# Patient Record
Sex: FEMALE | Race: OTHER | HISPANIC OR LATINO | Employment: FULL TIME | URBAN - METROPOLITAN AREA
[De-identification: names, ages, dates, MRNs, and addresses within clinical notes are randomized per-mention and may not be internally consistent; named-entity substitution may affect disease eponyms.]

---

## 2017-02-16 ENCOUNTER — ALLSCRIPTS OFFICE VISIT (OUTPATIENT)
Dept: OTHER | Facility: OTHER | Age: 52
End: 2017-02-16

## 2017-03-02 ENCOUNTER — ALLSCRIPTS OFFICE VISIT (OUTPATIENT)
Dept: OTHER | Facility: OTHER | Age: 52
End: 2017-03-02

## 2017-03-02 DIAGNOSIS — N93.0 POSTCOITAL AND CONTACT BLEEDING: ICD-10-CM

## 2017-03-02 DIAGNOSIS — Z12.31 ENCOUNTER FOR SCREENING MAMMOGRAM FOR MALIGNANT NEOPLASM OF BREAST: ICD-10-CM

## 2017-03-02 DIAGNOSIS — I83.893 VARICOSE VEINS OF BILATERAL LOWER EXTREMITIES WITH OTHER COMPLICATIONS: ICD-10-CM

## 2017-03-16 ENCOUNTER — ALLSCRIPTS OFFICE VISIT (OUTPATIENT)
Dept: OTHER | Facility: OTHER | Age: 52
End: 2017-03-16

## 2017-03-16 LAB — HCG, QUALITATIVE (HISTORICAL): NEGATIVE

## 2017-05-02 ENCOUNTER — GENERIC CONVERSION - ENCOUNTER (OUTPATIENT)
Dept: OTHER | Facility: OTHER | Age: 52
End: 2017-05-02

## 2017-06-30 ENCOUNTER — HOSPITAL ENCOUNTER (OUTPATIENT)
Dept: NON INVASIVE DIAGNOSTICS | Facility: CLINIC | Age: 52
Discharge: HOME | End: 2017-06-30
Payer: COMMERCIAL

## 2017-06-30 DIAGNOSIS — I83.893 VARICOSE VEINS OF BILATERAL LOWER EXTREMITIES WITH OTHER COMPLICATIONS: ICD-10-CM

## 2017-06-30 PROCEDURE — 93970 EXTREMITY STUDY: CPT

## 2017-07-12 ENCOUNTER — ALLSCRIPTS OFFICE VISIT (OUTPATIENT)
Dept: OTHER | Facility: OTHER | Age: 52
End: 2017-07-12

## 2017-07-12 DIAGNOSIS — I83.893 VARICOSE VEINS OF BILATERAL LOWER EXTREMITIES WITH OTHER COMPLICATIONS: ICD-10-CM

## 2017-07-12 DIAGNOSIS — I87.2 VENOUS INSUFFICIENCY (CHRONIC) (PERIPHERAL): ICD-10-CM

## 2018-01-11 NOTE — MISCELLANEOUS
Message   Recorded as Task   Date: 02/16/2016 10:44 AM, Created By: Ricky Luke   Task Name: Call Back   Assigned To: Kd Ramos   Regarding Patient: Joya Ram, Status: In Progress   HelioLiam Altamirano - 16 Feb 2016 10:44 AM     TASK CREATED  Caller: Self; [phone] (Home); [phone] (Work)  pt called - she called central scheduling and they are telling her that her u/s script or mammo slip she got yesterday needs to be worded differently? please advise [phone]   Deisi Arrieta - 16 Feb 2016 10:45 AM     TASK IN PROGRESS   Deisi Arrieta - 16 Feb 2016 11:15 AM     TASK EDITED  spoke  with      pt has a possible lft breast cyst, but according to , must order a bilateral diag [place] with a lft br u/s    pt aware    eh will contact pt        Active Problems    1  Acute upper respiratory infection (465 9) (J06 9)   2  Breast cyst (610 0) (N60 09)   3  Cellulitis of toe, unspecified laterality (681 10) (L03 039)   4  Contact dermatitis due to plant (692 6) (L25 5)   5  Cough (786 2) (R05)   6  Cyst of left breast (610 0) (N60 02)   7  Dyspepsia (536 8) (K30)   8  Encounter for gynecological examination without abnormal finding (V72 31) (Z01 419)   9  Encounter for routine gynecological examination (V72 31) (Z01 419)   10  Encounter for routine gynecological examination with Papanicolaou smear of cervix    (V72 31,V76 2) (Z01 419,Z12 4)   11  Encounter for screening colonoscopy (V76 51) (Z12 11)   12  Encounter for screening mammogram for malignant neoplasm of breast (V76 12)    (Z12 31)   13  Mammogram abnormal (793 80) (R92 8)   14  Noninfectious Dermatological Conditions (709 9)   15  Screening for HPV (human papillomavirus) (V73 81) (Z11 51)    Current Meds   1  Daily Vitamins Oral Tablet; Therapy: (Recorded:19Jun2014) to Recorded   2  Desmopressin Acetate 0 2 MG Oral Tablet; Therapy: (Recorded:19Jun2014) to Recorded    Allergies    1   No Known Drug Allergies    Plan  Breast cyst · MAMMO DIAGNOSTIC BILATERAL W CAD; Status:Hold For - Scheduling,Retrospective  By Protocol Authorization; Requested for:[id number];    · US BREAST LEFT LIMITED; Status:Hold For - Scheduling,Retrospective By Protocol  Authorization;  Requested for:[id number];     Signatures   Electronically signed by : Lance Barba, ; Feb 16 2016 11:15AM EST                       (Author)

## 2018-01-12 VITALS
WEIGHT: 112 LBS | SYSTOLIC BLOOD PRESSURE: 120 MMHG | HEIGHT: 60 IN | DIASTOLIC BLOOD PRESSURE: 76 MMHG | BODY MASS INDEX: 21.99 KG/M2

## 2018-01-13 VITALS
HEART RATE: 72 BPM | RESPIRATION RATE: 16 BRPM | WEIGHT: 110 LBS | BODY MASS INDEX: 21.6 KG/M2 | SYSTOLIC BLOOD PRESSURE: 102 MMHG | DIASTOLIC BLOOD PRESSURE: 62 MMHG | HEIGHT: 60 IN

## 2018-01-13 NOTE — MISCELLANEOUS
Provider Comments  Provider Comments:   Called patient and lmtcb and r/s        Signatures   Electronically signed by :  Yulia Underwood, ; Feb 16 2017  3:13PM EST                       (Author)

## 2018-01-16 NOTE — PROGRESS NOTES
Assessment    1  Symptomatic varicose veins of both lower extremities (454 8) (S89 020)   2  Spider veins of both lower extremities (454 9) (I30 50)    Plan    1  Gradient compression stocking, below knee, 20-30mm Hg, each; Status:Complete;     Done: 13UXG4415   2  VAS REFLUX LOWER LIMB   ; Status:Active; Requested for:02Mar2017;    3  Apply moisturizing cream or lotion several times a day ; Status:Complete;   Done:   22RGO0476   4  Keep your leg elevated whenever you can to decrease swelling and increase circulation ;   Status:Complete;   Done: 41VAH8989   5  Support stockings can help keep the blood from pooling in the small veins in your feet   and legs ; Status:Complete;   Done: 75VSY3150   6  Follow-up visit in 3 months Evaluation and Treatment  Follow-up  Status: Complete    Done: 87RVX6211    Discussion/Summary  Discussion Summary:   46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  She is having heaviness/aching discomfort on a daily basis and occasional swelling to bilateral lower extremities  -Recommended a trial of conservative measures  This includes the daily use of compression (Rx given) and periodic leg elevation  She should continue to maintain her normal weight    -Will do venous reflux study in 3 months and she will return to the office with a physician to determine treatment options  Counseling Documentation With Imm: The patient was counseled regarding instructions for management, risk factor reductions, prognosis, patient and family education, impressions  Chief Complaint  Chief Complaint Free Text Note Form: " I have varicose veins "   Ms Vanessa Chen is here today to have her legs evaluated  SHe c/o bilateral varicose and spider veins  She states she had injections 2 years ago and was not satisfied with the results  She c/o pain to the lower extremities at the end of the day  SHe c/o edema to the lower extremities at times   SHe does not wear compression stockings  SHe does elevate her legs at end of the day  History of Present Illness  Varicose Veins Ady Parkinson Vascular: The patient is being seen for an initial evaluation of varicose veins  Symptoms:  bilateral bulging veins, bilateral discolored veins, bilateral leg swelling, bilateral muscle cramps, bilateral spider veins, bilateral hyperpigmentation and bilateral leg pain, but no stasis dermatitis, no cellulitis, no venous ulcers, no dry skin, no itching and no bleeding  The patient describes the pain as aching, heavy, dull and symptoms worse toward the end of the day and standing for long periods of time  These symptoms developed year(s) ago  This patient has no history of DVT, pulmonary embolism, superficial venous thrombosis, or a hypercoagulable state  Evaluation and Treatment History: This patient has had previous surgical treatment which has included injection sclerotherapy  This patient is not currently utilizing any conservative management strategies to manage the current symptoms  Free Text HPI: 46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  She is having heaviness/aching discomfort on a daily basis and occasional swelling to bilateral lower extremities for past several years  She also has cosmetic concerns of spider veins  She previously has injection sclerotherapy and has permanent hyperpigmentations at prior injection sites  She stands for long periods of time  She is not tried compression in the past       Review of Systems  Complete Female - Vasc:   Constitutional: no fever, loss in appetite, no recent weight gain, no chills, not feeling tired and no recent weight loss  Eyes: eye pain, dryness of the eyes, does not wears glasses, no sudden vision loss, no blurred vision and no double vision, but no eyesight problems, eyes not red, no purulent discharge from the eyes and no itching of the eyes  ENT: no loss of hearing, no nosebleeds, no hoarseness  Cardiovascular: no chest pain, regular heart rate, painful veins and leg pain with walking  Respiratory: No sob, no wheezing, no cough, no sob with exertion, no orthopnea  Gastrointestinal: No nausea, No vomiting, no diarrhea, no blood in stool  Genitourinary: no dysuria, no Hematuria,no urinary incontinence  Musculoskeletal: no limb pain, no limb swelling  Integumentary: no rash, no lesions, no wounds, no ulcer  Neurological: no dementia, no headache, no numbness, no limb weakness, no dizziness, no difficulty walking  Psychiatric: no depression, no mood disorders, no anxiety  Hematologic/Lymphatic: no bleeding disorder, no easy bruising  ROS Reviewed:   ROS reviewed  Active Problems    1  Acute upper respiratory infection (465 9) (J06 9)   2  Breast cyst (610 0) (N60 09)   3  Cellulitis of toe, unspecified laterality (681 10) (L03 039)   4  Contact dermatitis due to plant (692 6) (L25 5)   5  Cough (786 2) (R05)   6  Cyst of left breast (610 0) (N60 02)   7  Dyspepsia (536 8) (K30)   8  Encounter for gynecological examination without abnormal finding (V72 31) (Z01 419)   9  Encounter for routine gynecological examination (V72 31) (Z01 419)   10  Encounter for routine gynecological examination with Papanicolaou smear of cervix    (V72 31,V76 2) (Z01 419)   11  Encounter for screening colonoscopy (V76 51) (Z12 11)   12  Encounter for screening mammogram for malignant neoplasm of breast (V76 12)    (Z12 31)   13  Mammogram abnormal (793 80) (R92 8)   14  Noninfectious Dermatological Conditions (709 9)   15  Screening for HPV (human papillomavirus) (V73 81) (Z11 51)    Past Medical History    1  History of Age At First Period 13 Years Old (Menarche)  Active Problems And Past Medical History Reviewed: The active problems and past medical history were reviewed and updated today  Surgical History  Surgical History Reviewed: The surgical history was reviewed and updated today  Family History  Mother    1  Family history of Hypertension (V17 49)  Father    2  Family history of Diabetes Mellitus (V18 0)  Family History Reviewed: The family history was reviewed and updated today  Social History    · Being A Social Drinker   · Denied: History of Caffeine Use   · Exercising Regularly   · Never A Smoker   · Unknown If Ever Smoked  Social History Reviewed: The social history was reviewed and updated today  Current Meds   1  Daily Vitamins Oral Tablet; Therapy: (Recorded:19Jun2014) to Recorded   2  Desmopressin Acetate 0 2 MG Oral Tablet; Therapy: (Recorded:19Jun2014) to Recorded  Medication List Reviewed: The medication list was reviewed and updated today  Allergies    1  No Known Drug Allergies    Vitals  Vital Signs    Recorded: 29ONH2934 04:52PM   Heart Rate 62, R Radial   Pulse Quality Normal, R Radial   Respiration Quality Normal   Respiration 16   Systolic 935, RUE, Sitting   Diastolic 70, RUE, Sitting   Height 5 ft    Weight 112 lb 6 oz   BMI Calculated 21 95   BSA Calculated 1 46     Physical Exam    Posterior tibialis: right 2+ and left 2+  Dorsalis pedis: right 2+ and left 2+  Distal Pulse Exam: Normal Capillary Refill  Extremities: bilateral ankle 1+ pitting edema and bilateral pretibial 1+ pitting edema  LE Varicose Veins: right leg truncal veins, left leg truncal veins, right leg spider veins and left leg spider veins  The heart rate was normal  The rhythm was regular  Heart sounds: normal S1 and normal S2    Murmurs: No murmurs were heard  Pulmonary   Respiratory effort: No increased work of breathing or signs of respiratory distress  Auscultation of lungs: Clear to auscultation  No wheezing, no rales, no rhonchi  Abdomen   Abdomen: Abdomen soft, non-tender, no masses, non distended, no rebound tenderness     Psychiatric   Orientation to person, place and time: Normal    Mood and affect: Normal    Ears, Nose, Mouth, and Throat   Hearing: Normal    Neurologic Sensory exam normal   Motor skills intact  Musculoskeletal   Gait and station: Normal  toenail fungus  Skin mild erythematous changes distal 1/3 lower leg darnk brown discolored spider veins of L shin        Future Appointments    Date/Time Provider Specialty Site   03/16/2017 03:00 PM Jake Carlos CNM Obstetrics/Gynecology [facility]   06/14/2017 09:15 AM Doctor, Laila Israel MD Vascular Surgery [facility] [place]     Signatures   Electronically signed by : Danielito Hoyt; Mar  2 2017  5:47PM EST                       (Author)    Electronically signed by : Kevin Jacobo DO; Mar  8 2017  4:46PM EST                       (Author)

## 2018-01-22 VITALS
DIASTOLIC BLOOD PRESSURE: 70 MMHG | HEIGHT: 60 IN | HEART RATE: 62 BPM | WEIGHT: 112.38 LBS | RESPIRATION RATE: 16 BRPM | SYSTOLIC BLOOD PRESSURE: 116 MMHG | BODY MASS INDEX: 22.06 KG/M2

## 2018-01-30 ENCOUNTER — OFFICE VISIT (OUTPATIENT)
Dept: VASCULAR SURGERY | Facility: CLINIC | Age: 53
End: 2018-01-30
Payer: COMMERCIAL

## 2018-01-30 VITALS
WEIGHT: 117 LBS | DIASTOLIC BLOOD PRESSURE: 68 MMHG | TEMPERATURE: 97.6 F | HEIGHT: 60 IN | RESPIRATION RATE: 16 BRPM | HEART RATE: 72 BPM | BODY MASS INDEX: 22.97 KG/M2 | SYSTOLIC BLOOD PRESSURE: 114 MMHG

## 2018-01-30 DIAGNOSIS — I87.2 VENOUS INSUFFICIENCY OF BOTH LOWER EXTREMITIES: ICD-10-CM

## 2018-01-30 DIAGNOSIS — I83.893 SYMPTOMATIC VARICOSE VEINS OF BOTH LOWER EXTREMITIES: Primary | ICD-10-CM

## 2018-01-30 DIAGNOSIS — I83.93 SPIDER VEINS OF BOTH LOWER EXTREMITIES: ICD-10-CM

## 2018-01-30 PROBLEM — N93.0 POSTCOITAL BLEEDING: Status: ACTIVE | Noted: 2017-03-16

## 2018-01-30 PROBLEM — N92.6 IRREGULAR PERIODS/MENSTRUAL CYCLES: Status: ACTIVE | Noted: 2017-03-16

## 2018-01-30 PROCEDURE — 99213 OFFICE O/P EST LOW 20 MIN: CPT | Performed by: SURGERY

## 2018-01-30 RX ORDER — DESMOPRESSIN ACETATE 0.2 MG/1
TABLET ORAL
COMMUNITY
End: 2020-07-29 | Stop reason: SDUPTHER

## 2018-01-30 RX ORDER — ASPIRIN 81 MG
1 TABLET, DELAYED RELEASE (ENTERIC COATED) ORAL DAILY
COMMUNITY

## 2018-01-30 RX ORDER — OXYCODONE HYDROCHLORIDE AND ACETAMINOPHEN 5; 325 MG/1; MG/1
TABLET ORAL
Refills: 0 | COMMUNITY
Start: 2017-11-10 | End: 2018-05-29 | Stop reason: ALTCHOICE

## 2018-01-30 NOTE — PATIENT INSTRUCTIONS
Varicose Veins   WHAT YOU NEED TO KNOW:   What are varicose veins? Varicose veins are veins that become large, twisted, and swollen  They are common on the back of the calves, knees, and thighs  Varicose veins are caused by valves in your veins that do not work properly  This causes blood to collect and increase pressure in the veins of your legs  The increased pressure causes your veins to stretch, get larger, swell, and twist        What increases my risk for varicose veins? · Pregnancy    · A family history of varicose veins    · Being overweight or obese    · Age 48 years or older    · Sitting or standing for long periods of time    · Wearing tight clothing  What are the signs and symptoms of varicose veins? Your symptoms may be worse after you stand or sit for long periods of time  You may have any of the following:  · Blue, purple, or bulging veins in your legs     · Pain, swelling, or muscle cramps in your legs    · Feeling of fatigue or heaviness in your legs  How are varicose veins diagnosed? Your healthcare provider will examine your legs and ask about your medical history  You may need tests, such as a Doppler ultrasound or duplex scan  These tests show your veins and valves, and how your blood is flowing through them  These tests may also show if there is a blockage or blood clot  How are varicose veins treated? The goal of treatment is to decrease symptoms, improve appearance, and prevent further problems  Treatment will depend on which veins are affected and how severe your condition is  You may need procedures to treat or remove your varicose veins  For example, your healthcare provider may inject a solution or use a laser to close the varicose veins  Surgery to remove long veins may also be done  Ask your healthcare provider for more information about procedures used to treat varicose veins  What can I do to manage my symptoms? · Do not sit or stand for long periods of time    This can cause the blood to collect in your legs and make your symptoms worse  Bend or rotate your ankles several times every hour  Walk around for a few minutes every hour to get blood moving in your legs  · Do not cross your legs when you sit  This decreases blood flow to your feet and can make your symptoms worse  · Do not wear tight clothing or shoes  Do not wear high-heeled shoes  Do not wear clothes that are tight around the waist or knees  · Maintain a healthy weight  Being overweight or obese can make your varicose veins worse  Ask your healthcare provider how much you should weigh  Ask him or her to help you create a weight loss plan if you are overweight  · Wear pressure stockings as directed  The stockings are tight and put pressure on your legs  They improve blood flow and help prevent clots  · Elevate your legs  Keep them above the level of your heart for 15 to 30 minutes several times a day  You can also prop the end of your bed up slightly to elevate your legs while you sleep  This will help blood to flow back to your heart  · Get regular exercise  Talk to your healthcare provider about the best exercise plan for you  Exercise can improve blood flow to your legs and feet  When should I seek immediate care? · You have a wound that does not heal or is infected  · You have an injury that has broken your skin and caused your varicose veins to bleed  · Your leg is swollen and hard  · You notice that your legs or feet are turning blue or black  · Your leg feels warm, tender, and painful  It may look swollen and red  When should I contact my healthcare provider? · You have pain in your leg that does not go away or gets worse  · You notice sudden large bruising on your legs  · You have a rash on your leg  · Your symptoms keep you from doing your daily activities  · You have questions or concerns about your condition or care    CARE AGREEMENT:   You have the right to help plan your care  Learn about your health condition and how it may be treated  Discuss treatment options with your caregivers to decide what care you want to receive  You always have the right to refuse treatment  The above information is an  only  It is not intended as medical advice for individual conditions or treatments  Talk to your doctor, nurse or pharmacist before following any medical regimen to see if it is safe and effective for you  © 2017 [employer]  Information is for End User's use only and may not be sold, redistributed or otherwise used for commercial purposes  All illustrations and images included in CareNotes® are the copyrighted property of [employer]  or Jeffery Delgado

## 2018-01-30 NOTE — PROGRESS NOTES
Assessment/Plan:      Patient is a 47 yo F, otherwise healthy, with painful varicosities    Symptomatic varicose veins of both lower extremities  Venous insufficiency of both lower extremities  -     Case request operating room: R GSV EVLT with stab phlebectomies; Standing  -     Basic metabolic panel; Future  -     CBC and Platelet; Future  -     Case request operating room: R GSV EVLT with stab phlebectomies  -patient was previously scheduled for EVLT but insurance denied  -continues to have BLE pain, moderate swelling, heaviness, aching of BLE R=L x 10yrs; this has worsened since last visit 6 mos ago  -patient has completed 3mo trial of compression stocking use, leg elevation, and maintained her active lifestyle and healthy weight; she has noted some improvement but continues to struggle with her more active daily activities (mowing the lawn) and standing for long periods of time; she continues these conservative measures on a daily basis   -reviewed reflux study which shows R CFV reflux and GSV throughout the leg from inguinal to knee and distal calf, SSV at mid calf and L CFV/FV reflux as well as L GSV reflux from inguinal to mid calf  -discussed pathophysiology of venous disease and further treatment options including continued medical management vs intervention with EVLT and stabs including risks and benfitis, including EHIT; patient would like to proceed with intervention; I believe patient is an excellent candidate as she has mainly superficial reflux, it is throughout the thighs B and she is very compliant with conservative management  -plan for R GSV EVLT with stab phlebectomies (will plan for interval L GSV EVLT with stabs, may be within 30d of first procedure)  -labs only; no cardiac or pulm hx (reordered today; consented previously)  -continue to wear your compression stockings daily, elevate your legs, and stay active       Spider veins of both lower extremities  -these are cosmetic only and could be treated with sclero at a later date if desired for an out of pocket cost    Other orders    -     Diet NPO; Sips with meds; Standing  -     Void on call to OR; Standing  -     Insert peripheral IV; Standing  -     Questionnaire Series; Standing  -     Nursing communcation Clip hair (do not shave) prior to surgery; Standing  -     ceFAZolin (ANCEF) IVPB (premix) 1,000 mg; Infuse 50 mL (1,000 mg total) into a venous catheter once   -     Place sequential compression device; Standing  -     Questionnaire Series        Subjective:      Patient ID: Lelon Nyhan is a 45 y o  female  Patient is a 47 yo F, otherwise healthy, with painful varicosities  Patient complains of symptoms in R=L legs  She has pain at the site of large varicosities, moderate edema, heaviness, fatigue, aching  She is active and does a lot of yardwork/housework  This aggravates her symptoms  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  She denies hx of DVT  +fam hx of varicosities in her mother  She has 1 child (age 32) and sxs not related to pregnancy  Sxs started about 10 yrs ago but worse over past year  Since last visit, she has been wearing her compression stockings, elevating her legs when possible  She has stayed active and is a healthy weight  She thinks her symptoms have become more severe than at last visit  She was treated for Lyme disease in the fall but no other changes to medical hx  The following portions of the patient's history were reviewed and updated as appropriate: allergies, current medications, past family history, past medical history, past social history, past surgical history and problem list     Review of Systems   Constitutional: Positive for fatigue  HENT: Negative  Eyes: Negative  Respiratory: Negative  Cardiovascular: Negative  Gastrointestinal: Negative  Endocrine: Negative  Genitourinary: Negative      Musculoskeletal:        BLE edema, achign, throbbing   Skin: Negative  Allergic/Immunologic: Negative  Neurological: Negative  Hematological: Negative  Psychiatric/Behavioral: Negative  Objective:     Physical Exam   Constitutional: She is oriented to person, place, and time  She appears well-developed and well-nourished  HENT:   Head: Normocephalic and atraumatic  Eyes: Conjunctivae are normal    Neck: Normal range of motion  Neck supple  Cardiovascular: Normal rate, regular rhythm and normal heart sounds  No murmur heard  Pulses:       Radial pulses are 2+ on the right side, and 2+ on the left side  Dorsalis pedis pulses are 0 on the right side, and 2+ on the left side  Posterior tibial pulses are 2+ on the right side, and 2+ on the left side  No carotid bruits B   Pulmonary/Chest: Effort normal and breath sounds normal  No respiratory distress  She has no wheezes  Abdominal: Soft  She exhibits no distension  There is no tenderness  There is no rebound  Musculoskeletal: Normal range of motion  She exhibits edema  BLE edema, 2+ pitting, ankles, legs, mild feet  No chronic venous changes  Large varicosity medial left leg and less prominent varicosity right leg diffuse spiders and retics   Neurological: She is alert and oriented to person, place, and time  Skin: Skin is warm and dry  Psychiatric: She has a normal mood and affect  Her behavior is normal    Nursing note and vitals reviewed  Patient Active Problem List   Diagnosis    Cyst of left breast    Dyspepsia    Irregular periods/menstrual cycles    Mammogram abnormal    Postcoital bleeding    Symptomatic varicose veins of both lower extremities    Spider veins of both lower extremities    Venous insufficiency of both lower extremities       History reviewed  No pertinent surgical history    No Known Allergies    Current Outpatient Prescriptions:     desmopressin (DDAVP) 0 2 mg tablet, Take by mouth, Disp: , Rfl:     Multiple Vitamin (DAILY VITAMIN) tablet, Take by mouth, Disp: , Rfl:     oxyCODONE-acetaminophen (PERCOCET) 5-325 mg per tablet, TAKE 1 TO 2 TABLETS BY MOUTH EVERY 4 HOURS AS NEEDED, Disp: , Rfl: 0

## 2018-01-31 ENCOUNTER — DOCUMENTATION (OUTPATIENT)
Dept: VASCULAR SURGERY | Facility: CLINIC | Age: 53
End: 2018-01-31

## 2018-05-29 ENCOUNTER — ANNUAL EXAM (OUTPATIENT)
Dept: OBGYN CLINIC | Facility: MEDICAL CENTER | Age: 53
End: 2018-05-29
Payer: COMMERCIAL

## 2018-05-29 VITALS
DIASTOLIC BLOOD PRESSURE: 60 MMHG | BODY MASS INDEX: 22.19 KG/M2 | HEIGHT: 60 IN | WEIGHT: 113 LBS | SYSTOLIC BLOOD PRESSURE: 102 MMHG

## 2018-05-29 DIAGNOSIS — Z01.419 ENCOUNTER FOR GYNECOLOGICAL EXAMINATION WITHOUT ABNORMAL FINDING: Primary | ICD-10-CM

## 2018-05-29 DIAGNOSIS — R92.2 DENSE BREAST TISSUE: ICD-10-CM

## 2018-05-29 DIAGNOSIS — Z12.4 ENCOUNTER FOR PAPANICOLAOU SMEAR OF CERVIX WITH HUMAN PAPILLOMA VIRUS (HPV) DNA COTESTING: ICD-10-CM

## 2018-05-29 DIAGNOSIS — Z12.31 ENCOUNTER FOR SCREENING MAMMOGRAM FOR MALIGNANT NEOPLASM OF BREAST: ICD-10-CM

## 2018-05-29 PROBLEM — N92.6 IRREGULAR PERIODS/MENSTRUAL CYCLES: Status: RESOLVED | Noted: 2017-03-16 | Resolved: 2018-05-29

## 2018-05-29 PROBLEM — N93.0 POSTCOITAL BLEEDING: Status: RESOLVED | Noted: 2017-03-16 | Resolved: 2018-05-29

## 2018-05-29 PROCEDURE — G0145 SCR C/V CYTO,THINLAYER,RESCR: HCPCS | Performed by: NURSE PRACTITIONER

## 2018-05-29 PROCEDURE — S0612 ANNUAL GYNECOLOGICAL EXAMINA: HCPCS | Performed by: NURSE PRACTITIONER

## 2018-05-29 PROCEDURE — 87624 HPV HI-RISK TYP POOLED RSLT: CPT | Performed by: NURSE PRACTITIONER

## 2018-05-29 NOTE — PROGRESS NOTES
[address] [facility] 45 y o  SUBJECTIVE    HPI: Pierre Mcneill is a 45 y o   female presenting today for annual GYN exam  Her last gynecologic exam was in 2017 at our practice  Postmenopausal, FMP was in 2016  Patient denies post-menopausal vaginal bleeding  Not currently sexually active  At her last GYN exam a year ago, she was reporting some vaginal bleeding with finger insertion - an EMBx could not be done at that visit due to stenotic os and a pelvic US was ordered however Sarah Graf did not have this done because she says that the problem went away and she is no longer concerned  Declined STI testing today  Denies sexual concerns  Has no breast, bowel, bladder, or vaginal concerns  Works as a  for [employer]  Gynecologic History  Last pap smear: Date: 2015 and Result: NILM, HPV(-)  History of abnormal pap smears: Denies  Last mammogram: 2017, BI RADS - 2  History of abnormal mammogram: Unknown  Contraceptive method: abstinence, postmenopausal      Obstetric History      Health Maintenance  Self-breast exams: No  Metabolic screening: managed by PCP  Tobacco cessation: N/A  Early DEXA bone density scan: N/A  Colonoscopy: , told to return Q10y      At todays visit I discussed the importance of self-breast exams, exercise and healthy diet  The recommendation is for annual mammograms  Reviewed ASCCP guidelines for cervical cancer screening  Safe sex practices were strongly encouraged to protect against sexually transmitted infections  Anticipatory guidance for perimenopause and menopause reviewed  I have reviewed the patients risk factors and an early DEXA bone density scan was ordered if applicable  Importance of vitamin D and adequate calcium intake was reviewed  All questions were answered to her apparent satisfaction       The following portions of the patient's history were reviewed and updated as appropriate: allergies, current medications, past family history, past medical history, past social history, past surgical history and problem list     ROS  General ROS: negative for chills or fever  Breast ROS: negative for breast lumps  Respiratory ROS: no cough, shortness of breath, or wheezing  Cardiovascular ROS: no chest pain or dyspnea on exertion  Gastrointestinal ROS: no abdominal pain, change in bowel habits, or black or bloody stools  Genito-Urinary ROS: no dysuria, trouble voiding, or hematuria  Neurological ROS: negative    OBJECTIVE  Vitals:    05/29/18 1539   BP: 102/60   BP Location: Left arm   Patient Position: Sitting   Cuff Size: Standard   Weight: 51 3 kg (113 lb)   Height: 5' (1 524 m)       Physical Exam   Constitutional: She is oriented to person, place, and time  Vital signs are normal  She appears well-developed and well-nourished  Genitourinary: Vagina normal and uterus normal  Pelvic exam was performed with patient supine  There is no rash, tenderness or lesion on the right labia  There is no rash, tenderness or lesion on the left labia  No erythema in the vagina  No vaginal discharge found  Right adnexum does not display mass and does not display tenderness  Left adnexum does not display mass and does not display tenderness  Cervix does not exhibit motion tenderness  Uterus is not tender  Genitourinary Comments: The distal portion (closest to posterior fourchette) of the left labia minora is plump and prominent, enlarged to about the size of a thumb, it is nontender, non-erythemic, normal in contour, and almost has the appearance of redundant tissue - it does not have the appearance of an abscess  In comparison, the right labia minora is atrophic with loss of architecture and normal in size  HENT:   Head: Normocephalic and atraumatic     Pulmonary/Chest: Effort normal  Right breast exhibits no inverted nipple, no mass, no nipple discharge, no skin change and no tenderness  Left breast exhibits no inverted nipple, no mass, no nipple discharge, no skin change and no tenderness  Breasts are symmetrical    Breast implants noted  Abdominal: Soft  Normal appearance  Musculoskeletal: Normal range of motion  Lymphadenopathy:     She has no axillary adenopathy  Neurological: She is alert and oriented to person, place, and time  Skin: Skin is warm, dry and intact  Psychiatric: She has a normal mood and affect  Her behavior is normal    Vitals reviewed  ASSESSMENT  Normal exam      PLAN  1  Order for annual mammogram provided to patient at todays visit  2 - Regarding the left labia minora finding, Beto Mosley is unsure if this is a new finding or if this is her normal anatomy  We discussed the finding in some detail  She says that she is unsure because she does not routinely check down there  There is no mention of this in previous GYN physical exam notes  It does not appear to be an abscess or an infection and is not causing any issue to patient  Plan to follow clinically and if bothersome to patient can consider biopsy or further evaluation  3 - Pap smear with HPV co-testing done today  4 - Return annually for routine GYN exams      All questions were answered & Beto Mosley expressed understanding      [facility]

## 2018-05-30 LAB — HPV RRNA GENITAL QL NAA+PROBE: NORMAL

## 2018-05-31 LAB
LAB AP GYN PRIMARY INTERPRETATION: NORMAL
Lab: NORMAL

## 2019-05-31 ENCOUNTER — ANNUAL EXAM (OUTPATIENT)
Dept: OBGYN CLINIC | Facility: MEDICAL CENTER | Age: 54
End: 2019-05-31
Payer: COMMERCIAL

## 2019-05-31 VITALS
HEIGHT: 62 IN | SYSTOLIC BLOOD PRESSURE: 120 MMHG | WEIGHT: 116 LBS | DIASTOLIC BLOOD PRESSURE: 80 MMHG | BODY MASS INDEX: 21.35 KG/M2

## 2019-05-31 DIAGNOSIS — Z01.419 ROUTINE GYNECOLOGICAL EXAMINATION: ICD-10-CM

## 2019-05-31 DIAGNOSIS — N92.6 IRREGULAR BLEEDING: ICD-10-CM

## 2019-05-31 DIAGNOSIS — Z12.31 ENCOUNTER FOR SCREENING MAMMOGRAM FOR MALIGNANT NEOPLASM OF BREAST: Primary | ICD-10-CM

## 2019-05-31 PROCEDURE — S0612 ANNUAL GYNECOLOGICAL EXAMINA: HCPCS | Performed by: PHYSICIAN ASSISTANT

## 2019-06-21 ENCOUNTER — HOSPITAL ENCOUNTER (OUTPATIENT)
Dept: RADIOLOGY | Facility: MEDICAL CENTER | Age: 54
Discharge: HOME | End: 2019-06-21
Payer: COMMERCIAL

## 2019-06-21 DIAGNOSIS — N92.6 IRREGULAR BLEEDING: ICD-10-CM

## 2019-06-21 PROCEDURE — 76830 TRANSVAGINAL US NON-OB: CPT

## 2019-06-21 PROCEDURE — 76856 US EXAM PELVIC COMPLETE: CPT

## 2019-06-26 ENCOUNTER — TELEPHONE (OUTPATIENT)
Dept: OBGYN CLINIC | Facility: CLINIC | Age: 54
End: 2019-06-26

## 2020-06-23 ENCOUNTER — ANNUAL EXAM (OUTPATIENT)
Dept: OBGYN CLINIC | Facility: MEDICAL CENTER | Age: 55
End: 2020-06-23
Payer: COMMERCIAL

## 2020-06-23 VITALS
BODY MASS INDEX: 19.84 KG/M2 | WEIGHT: 107.8 LBS | DIASTOLIC BLOOD PRESSURE: 78 MMHG | SYSTOLIC BLOOD PRESSURE: 98 MMHG | HEIGHT: 62 IN

## 2020-06-23 DIAGNOSIS — G47.09 OTHER INSOMNIA: ICD-10-CM

## 2020-06-23 DIAGNOSIS — N95.1 VAGINAL DRYNESS, MENOPAUSAL: ICD-10-CM

## 2020-06-23 DIAGNOSIS — Z01.419 ENCOUNTER FOR GYNECOLOGICAL EXAMINATION (GENERAL) (ROUTINE) WITHOUT ABNORMAL FINDINGS: ICD-10-CM

## 2020-06-23 DIAGNOSIS — Z12.31 ENCOUNTER FOR SCREENING MAMMOGRAM FOR MALIGNANT NEOPLASM OF BREAST: Primary | ICD-10-CM

## 2020-06-23 DIAGNOSIS — N95.0 POSTMENOPAUSAL POSTCOITAL BLEEDING: ICD-10-CM

## 2020-06-23 DIAGNOSIS — N93.0 POSTMENOPAUSAL POSTCOITAL BLEEDING: ICD-10-CM

## 2020-06-23 PROCEDURE — S0612 ANNUAL GYNECOLOGICAL EXAMINA: HCPCS | Performed by: NURSE PRACTITIONER

## 2020-06-23 PROCEDURE — 3008F BODY MASS INDEX DOCD: CPT | Performed by: NURSE PRACTITIONER

## 2020-06-23 RX ORDER — ESTRADIOL 0.1 MG/G
1 CREAM VAGINAL 2 TIMES WEEKLY
Qty: 42.5 G | Refills: 1 | Status: SHIPPED | OUTPATIENT
Start: 2020-06-25 | End: 2021-07-26 | Stop reason: SDUPTHER

## 2020-07-29 ENCOUNTER — APPOINTMENT (OUTPATIENT)
Dept: LAB | Facility: MEDICAL CENTER | Age: 55
End: 2020-07-29
Payer: COMMERCIAL

## 2020-07-29 ENCOUNTER — OFFICE VISIT (OUTPATIENT)
Dept: FAMILY MEDICINE CLINIC | Facility: CLINIC | Age: 55
End: 2020-07-29
Payer: COMMERCIAL

## 2020-07-29 ENCOUNTER — HOSPITAL ENCOUNTER (OUTPATIENT)
Dept: MAMMOGRAPHY | Facility: HOSPITAL | Age: 55
Discharge: HOME | End: 2020-07-29
Payer: COMMERCIAL

## 2020-07-29 VITALS
WEIGHT: 109 LBS | HEART RATE: 52 BPM | TEMPERATURE: 98.4 F | SYSTOLIC BLOOD PRESSURE: 110 MMHG | HEIGHT: 61 IN | DIASTOLIC BLOOD PRESSURE: 62 MMHG | BODY MASS INDEX: 20.58 KG/M2 | RESPIRATION RATE: 18 BRPM | OXYGEN SATURATION: 98 %

## 2020-07-29 VITALS — BODY MASS INDEX: 20.58 KG/M2 | WEIGHT: 109 LBS | HEIGHT: 61 IN

## 2020-07-29 DIAGNOSIS — Z23 IMMUNIZATION DUE: ICD-10-CM

## 2020-07-29 DIAGNOSIS — Z13.220 SCREENING CHOLESTEROL LEVEL: ICD-10-CM

## 2020-07-29 DIAGNOSIS — K42.9 UMBILICAL HERNIA WITHOUT OBSTRUCTION AND WITHOUT GANGRENE: ICD-10-CM

## 2020-07-29 DIAGNOSIS — N39.44 BED WETTING: Primary | ICD-10-CM

## 2020-07-29 DIAGNOSIS — Z12.31 ENCOUNTER FOR SCREENING MAMMOGRAM FOR MALIGNANT NEOPLASM OF BREAST: ICD-10-CM

## 2020-07-29 DIAGNOSIS — L23.7 POISON IVY: ICD-10-CM

## 2020-07-29 DIAGNOSIS — R53.83 OTHER FATIGUE: ICD-10-CM

## 2020-07-29 LAB
ALBUMIN SERPL BCP-MCNC: 3.5 G/DL (ref 3.5–5)
ALP SERPL-CCNC: 85 U/L (ref 46–116)
ALT SERPL W P-5'-P-CCNC: 25 U/L (ref 12–78)
ANION GAP SERPL CALCULATED.3IONS-SCNC: 5 MMOL/L (ref 4–13)
AST SERPL W P-5'-P-CCNC: 25 U/L (ref 5–45)
BASOPHILS # BLD AUTO: 0.03 THOUSANDS/ΜL (ref 0–0.1)
BASOPHILS NFR BLD AUTO: 1 % (ref 0–1)
BILIRUB SERPL-MCNC: 1.08 MG/DL (ref 0.2–1)
BILIRUB UR QL STRIP: NEGATIVE
BUN SERPL-MCNC: 18 MG/DL (ref 5–25)
CALCIUM SERPL-MCNC: 8.3 MG/DL (ref 8.3–10.1)
CHLORIDE SERPL-SCNC: 109 MMOL/L (ref 100–108)
CHOLEST SERPL-MCNC: 220 MG/DL (ref 50–200)
CLARITY UR: CLEAR
CO2 SERPL-SCNC: 27 MMOL/L (ref 21–32)
COLOR UR: YELLOW
CREAT SERPL-MCNC: 0.69 MG/DL (ref 0.6–1.3)
EOSINOPHIL # BLD AUTO: 0.17 THOUSAND/ΜL (ref 0–0.61)
EOSINOPHIL NFR BLD AUTO: 5 % (ref 0–6)
ERYTHROCYTE [DISTWIDTH] IN BLOOD BY AUTOMATED COUNT: 13.2 % (ref 11.6–15.1)
GFR SERPL CREATININE-BSD FRML MDRD: 98 ML/MIN/1.73SQ M
GLUCOSE P FAST SERPL-MCNC: 75 MG/DL (ref 65–99)
GLUCOSE UR STRIP-MCNC: NEGATIVE MG/DL
HCT VFR BLD AUTO: 38 % (ref 34.8–46.1)
HDLC SERPL-MCNC: 89 MG/DL
HGB BLD-MCNC: 12.8 G/DL (ref 11.5–15.4)
HGB UR QL STRIP.AUTO: NEGATIVE
IMM GRANULOCYTES # BLD AUTO: 0 THOUSAND/UL (ref 0–0.2)
IMM GRANULOCYTES NFR BLD AUTO: 0 % (ref 0–2)
KETONES UR STRIP-MCNC: NEGATIVE MG/DL
LDLC SERPL CALC-MCNC: 120 MG/DL (ref 0–100)
LEUKOCYTE ESTERASE UR QL STRIP: NEGATIVE
LYMPHOCYTES # BLD AUTO: 1.2 THOUSANDS/ΜL (ref 0.6–4.47)
LYMPHOCYTES NFR BLD AUTO: 35 % (ref 14–44)
MAGNESIUM SERPL-MCNC: 2.2 MG/DL (ref 1.6–2.6)
MCH RBC QN AUTO: 33.2 PG (ref 26.8–34.3)
MCHC RBC AUTO-ENTMCNC: 33.7 G/DL (ref 31.4–37.4)
MCV RBC AUTO: 98 FL (ref 82–98)
MONOCYTES # BLD AUTO: 0.29 THOUSAND/ΜL (ref 0.17–1.22)
MONOCYTES NFR BLD AUTO: 8 % (ref 4–12)
NEUTROPHILS # BLD AUTO: 1.77 THOUSANDS/ΜL (ref 1.85–7.62)
NEUTS SEG NFR BLD AUTO: 51 % (ref 43–75)
NITRITE UR QL STRIP: NEGATIVE
NRBC BLD AUTO-RTO: 0 /100 WBCS
PH UR STRIP.AUTO: 6 [PH]
PLATELET # BLD AUTO: 239 THOUSANDS/UL (ref 149–390)
PMV BLD AUTO: 10.6 FL (ref 8.9–12.7)
POTASSIUM SERPL-SCNC: 3.6 MMOL/L (ref 3.5–5.3)
PROT SERPL-MCNC: 6.7 G/DL (ref 6.4–8.2)
PROT UR STRIP-MCNC: NEGATIVE MG/DL
RBC # BLD AUTO: 3.86 MILLION/UL (ref 3.81–5.12)
SODIUM SERPL-SCNC: 141 MMOL/L (ref 136–145)
SP GR UR STRIP.AUTO: 1.02 (ref 1–1.03)
TRIGL SERPL-MCNC: 55 MG/DL
TSH SERPL DL<=0.05 MIU/L-ACNC: 2.52 UIU/ML (ref 0.36–3.74)
URATE SERPL-MCNC: 3.7 MG/DL (ref 2–6.8)
UROBILINOGEN UR QL STRIP.AUTO: 0.2 E.U./DL
WBC # BLD AUTO: 3.46 THOUSAND/UL (ref 4.31–10.16)

## 2020-07-29 PROCEDURE — 84443 ASSAY THYROID STIM HORMONE: CPT

## 2020-07-29 PROCEDURE — 90471 IMMUNIZATION ADMIN: CPT | Performed by: FAMILY MEDICINE

## 2020-07-29 PROCEDURE — 80053 COMPREHEN METABOLIC PANEL: CPT

## 2020-07-29 PROCEDURE — 1036F TOBACCO NON-USER: CPT | Performed by: FAMILY MEDICINE

## 2020-07-29 PROCEDURE — 85025 COMPLETE CBC W/AUTO DIFF WBC: CPT

## 2020-07-29 PROCEDURE — 84550 ASSAY OF BLOOD/URIC ACID: CPT

## 2020-07-29 PROCEDURE — 83735 ASSAY OF MAGNESIUM: CPT

## 2020-07-29 PROCEDURE — 3008F BODY MASS INDEX DOCD: CPT | Performed by: FAMILY MEDICINE

## 2020-07-29 PROCEDURE — 80061 LIPID PANEL: CPT

## 2020-07-29 PROCEDURE — 36415 COLL VENOUS BLD VENIPUNCTURE: CPT

## 2020-07-29 PROCEDURE — 77067 SCR MAMMO BI INCL CAD: CPT

## 2020-07-29 PROCEDURE — 90750 HZV VACC RECOMBINANT IM: CPT | Performed by: FAMILY MEDICINE

## 2020-07-29 PROCEDURE — 77063 BREAST TOMOSYNTHESIS BI: CPT

## 2020-07-29 PROCEDURE — 99213 OFFICE O/P EST LOW 20 MIN: CPT | Performed by: FAMILY MEDICINE

## 2020-07-29 PROCEDURE — 81003 URINALYSIS AUTO W/O SCOPE: CPT

## 2020-07-29 RX ORDER — DESMOPRESSIN ACETATE 0.2 MG/1
0.2 TABLET ORAL DAILY
Qty: 90 TABLET | Refills: 3 | Status: SHIPPED | OUTPATIENT
Start: 2020-07-29 | End: 2020-08-20 | Stop reason: SDUPTHER

## 2020-07-29 RX ORDER — TRIAMCINOLONE ACETONIDE 1 MG/G
CREAM TOPICAL 2 TIMES DAILY
Qty: 30 G | Refills: 0 | Status: SHIPPED | OUTPATIENT
Start: 2020-07-29 | End: 2021-06-30 | Stop reason: SDUPTHER

## 2020-07-29 NOTE — PROGRESS NOTES
Assessment/Plan:         Problem List Items Addressed This Visit        Musculoskeletal and Integument    Poison ivy    Relevant Medications    triamcinolone (KENALOG) 0 1 % cream       Other    Bed wetting - Primary    Relevant Medications    desmopressin (DDAVP) 0 2 mg tablet      Other Visit Diagnoses     Immunization due        Relevant Medications    Zoster Vac Recomb Adjuvanted (Shingrix) 50 MCG/0 5ML SUSR    Other fatigue        Relevant Orders    CBC and differential    Comprehensive metabolic panel    UA w Reflex to Microscopic w Reflex to Culture    TSH + Free T4    Magnesium    Uric acid    Screening cholesterol level        Relevant Orders    Lipid Panel with Direct LDL reflex            Subjective:      Patient ID: Allen Larry is a 62 y o  female  Pt here for  checkup on  Bed wetting  And  Need for  "Poison ivy Cream"  Pt is due for labs and has an umbilical hernia  At about the 12 o'clock position on her Umbilicus  Pt  States it has  Been painful recently and was previously asympomatic  Pt  Will need a surgical consult for an evaluation  The following portions of the patient's history were reviewed and updated as appropriate:   She has a past medical history of Breast cyst, Dyspepsia, Irregular periods/menstrual cycles, Lyme disease, and Spider veins of both lower extremities  ,  does not have any pertinent problems on file  ,   has a past surgical history that includes Colonoscopy (2014) and AUGMENTATION BREAST ,  family history includes Diabetes in her father; Hypertension in her mother  ,   reports that she has never smoked  She has never used smokeless tobacco  She reports that she does not drink alcohol or use drugs  ,  has No Known Allergies     Current Outpatient Medications   Medication Sig Dispense Refill    desmopressin (DDAVP) 0 2 mg tablet Take 1 tablet (0 2 mg total) by mouth daily 90 tablet 3    estradiol (ESTRACE) 0 1 mg/g vaginal cream Insert 1 g into the vagina 2 (two) times a week for 90 doses 42 5 g 1    MELATONIN PO Take by mouth      Multiple Vitamin (DAILY VITAMIN) tablet Take by mouth      triamcinolone (KENALOG) 0 1 % cream Apply topically 2 (two) times a day 30 g 0    Zoster Vac Recomb Adjuvanted (Shingrix) 50 MCG/0 5ML SUSR Inject 0 5 mL into a muscle once for 1 dose Repeat dose in 2 to 6 months 1 each 1     No current facility-administered medications for this visit  Review of Systems      Objective:  Vitals:    07/29/20 1017   BP: 110/62   BP Location: Left arm   Patient Position: Sitting   Cuff Size: Standard   Pulse: (!) 52   Resp: 18   Temp: 98 4 °F (36 9 °C)   TempSrc: Temporal   SpO2: 98%   Weight: 49 4 kg (109 lb)   Height: 5' 1" (1 549 m)     Body mass index is 20 6 kg/m²  Physical Exam   Constitutional: She is oriented to person, place, and time  She appears well-developed and well-nourished  HENT:   Head: Normocephalic and atraumatic  Nose: Nose normal    Eyes: Pupils are equal, round, and reactive to light  Conjunctivae and EOM are normal  No scleral icterus  Neck: Normal range of motion  Neck supple  No thyromegaly present  Cardiovascular: Normal rate, regular rhythm and normal heart sounds  Pulmonary/Chest: Effort normal and breath sounds normal  She has no wheezes  Abdominal: Soft  Bowel sounds are normal  She exhibits mass  She exhibits no distension  There is no tenderness  There is no rebound and no guarding  Small umbilical hernia  About 12 o'clock position  Musculoskeletal: Normal range of motion  She exhibits no edema, tenderness or deformity  Neurological: She is alert and oriented to person, place, and time  No sensory deficit  Skin: Skin is warm and dry  No rash noted  No erythema  Psychiatric: She has a normal mood and affect  Her behavior is normal  Judgment and thought content normal    Nursing note and vitals reviewed

## 2020-08-11 ENCOUNTER — OFFICE VISIT (OUTPATIENT)
Dept: SURGERY | Facility: CLINIC | Age: 55
End: 2020-08-11
Payer: COMMERCIAL

## 2020-08-11 VITALS
WEIGHT: 108 LBS | TEMPERATURE: 97.5 F | HEART RATE: 56 BPM | HEIGHT: 61 IN | SYSTOLIC BLOOD PRESSURE: 100 MMHG | BODY MASS INDEX: 20.39 KG/M2 | DIASTOLIC BLOOD PRESSURE: 62 MMHG

## 2020-08-11 DIAGNOSIS — K42.9 UMBILICAL HERNIA WITHOUT OBSTRUCTION AND WITHOUT GANGRENE: Primary | ICD-10-CM

## 2020-08-11 PROCEDURE — 1036F TOBACCO NON-USER: CPT | Performed by: SURGERY

## 2020-08-11 PROCEDURE — 3008F BODY MASS INDEX DOCD: CPT | Performed by: SURGERY

## 2020-08-11 PROCEDURE — 99243 OFF/OP CNSLTJ NEW/EST LOW 30: CPT | Performed by: SURGERY

## 2020-08-11 NOTE — PATIENT INSTRUCTIONS
Indeed she has a tiny umbilical hernia which could be repaired without even putting mesh in  However, I want to hold off on that repair until she sees her gastroenterologist and also gets an ultrasound    There is a good chance that she is going to need her gallbladder out and I really do not want to go through repair to do that

## 2020-08-11 NOTE — PROGRESS NOTES
Assessment/Plan:  Small umbilical hernia; probable biliary tract disease    No problem-specific Assessment & Plan notes found for this encounter  Diagnoses and all orders for this visit:    Umbilical hernia without obstruction and without gangrene  -     Ambulatory referral to General Surgery  -     US abdomen complete; Future          Subjective:      Patient ID: Melba Diez is a 62 y o  female  The patient is a 49-year-old  female who is in no distress  Her complaint is 8-10 years of an umbilical hernia  She states that usually she has no pain but if she touches the area she can have discomfort  In going over history it turns out that she has a daily abdominal bloating and more so in the right upper quadrant than anywhere else  She states that it is a material what she eats, she gets this from everything  When she was younger she used to have upper epigastric pain but states that it went away with taking probiotics  She does not complain of ever being jaundiced          Review of Systems   Constitutional: Negative  HENT: Negative  Eyes: Negative  Respiratory: Negative  Cardiovascular: Negative  Gastrointestinal: Positive for abdominal distention  Bloating daily   Endocrine: Negative  Genitourinary: Negative  Musculoskeletal: Positive for arthralgias (knees)  Neurological: Negative  Psychiatric/Behavioral: Negative  Objective:      /62 (BP Location: Left arm, Patient Position: Sitting, Cuff Size: Standard)   Pulse 56   Temp 97 5 °F (36 4 °C) (Tympanic)   Ht 5' 1" (1 549 m)   Wt 49 kg (108 lb)   BMI 20 41 kg/m²          Physical Exam  Constitutional:       Appearance: Normal appearance  Comments: Thin and fit  female in no distress   HENT:      Head: Normocephalic and atraumatic  Eyes:      Extraocular Movements: Extraocular movements intact        Conjunctiva/sclera: Conjunctivae normal    Neck:      Musculoskeletal: Normal range of motion and neck supple  Cardiovascular:      Rate and Rhythm: Normal rate and regular rhythm  Heart sounds: Normal heart sounds  No murmur  Pulmonary:      Effort: Pulmonary effort is normal       Breath sounds: Normal breath sounds  No stridor  No wheezing, rhonchi or rales  Abdominal:      General: Abdomen is flat  Bowel sounds are normal  There is no distension  Palpations: There is no mass  Tenderness: There is no abdominal tenderness  There is no guarding  Hernia: A hernia is present  Comments: The hernia is about 3-4 mm and probably has some incarcerated fat in it   Musculoskeletal: Normal range of motion  Skin:     General: Skin is dry  Coloration: Skin is not jaundiced  Neurological:      Mental Status: She is alert     Psychiatric:         Mood and Affect: Mood normal          Behavior: Behavior normal

## 2020-08-13 ENCOUNTER — TELEPHONE (OUTPATIENT)
Dept: OBGYN CLINIC | Facility: CLINIC | Age: 55
End: 2020-08-13

## 2020-08-19 ENCOUNTER — HOSPITAL ENCOUNTER (OUTPATIENT)
Dept: RADIOLOGY | Facility: MEDICAL CENTER | Age: 55
Discharge: HOME | End: 2020-08-19
Payer: COMMERCIAL

## 2020-08-19 DIAGNOSIS — K42.9 UMBILICAL HERNIA WITHOUT OBSTRUCTION AND WITHOUT GANGRENE: ICD-10-CM

## 2020-08-19 PROCEDURE — 76700 US EXAM ABDOM COMPLETE: CPT

## 2020-08-20 ENCOUNTER — OFFICE VISIT (OUTPATIENT)
Dept: FAMILY MEDICINE CLINIC | Facility: CLINIC | Age: 55
End: 2020-08-20
Payer: COMMERCIAL

## 2020-08-20 VITALS
BODY MASS INDEX: 19.63 KG/M2 | RESPIRATION RATE: 12 BRPM | TEMPERATURE: 98 F | OXYGEN SATURATION: 98 % | DIASTOLIC BLOOD PRESSURE: 60 MMHG | HEIGHT: 61 IN | SYSTOLIC BLOOD PRESSURE: 100 MMHG | HEART RATE: 62 BPM | WEIGHT: 104 LBS

## 2020-08-20 DIAGNOSIS — E78.2 MIXED HYPERLIPIDEMIA: Primary | ICD-10-CM

## 2020-08-20 DIAGNOSIS — N39.44 BED WETTING: ICD-10-CM

## 2020-08-20 PROCEDURE — 1036F TOBACCO NON-USER: CPT | Performed by: FAMILY MEDICINE

## 2020-08-20 PROCEDURE — 99213 OFFICE O/P EST LOW 20 MIN: CPT | Performed by: FAMILY MEDICINE

## 2020-08-20 PROCEDURE — 3008F BODY MASS INDEX DOCD: CPT | Performed by: FAMILY MEDICINE

## 2020-08-20 RX ORDER — DESMOPRESSIN ACETATE 0.2 MG/1
0.2 TABLET ORAL 2 TIMES DAILY
Qty: 180 TABLET | Refills: 1 | Status: SHIPPED | OUTPATIENT
Start: 2020-08-20 | End: 2021-03-11

## 2020-08-20 NOTE — PROGRESS NOTES
Assessment/Plan:      There are no diagnoses linked to this encounter  Subjective:     Patient ID: Angelica Hall is a 62 y o  female  Pt here for cehckup on hyperlipidemia  Pt has  startred red rice  yeast and we discussed low  Cholesterol die  Pt will get  Lipid panel in 3 months  And  I gave her a low cholesterol diet  Pt  Also with need  To increase her  DDAVP to bid  Review of Systems   Constitutional: Negative for activity change, appetite change, fatigue and fever  HENT: Negative for congestion, ear pain, postnasal drip, rhinorrhea, sinus pressure, sinus pain, sneezing and sore throat  Eyes: Negative for pain and redness  Respiratory: Negative for apnea, cough, chest tightness, shortness of breath and wheezing  Cardiovascular: Negative for chest pain, palpitations and leg swelling  Gastrointestinal: Negative for abdominal pain, constipation, diarrhea, nausea and vomiting  Endocrine: Negative for cold intolerance and heat intolerance  Genitourinary: Negative for difficulty urinating, dysuria, frequency, hematuria and urgency  Musculoskeletal: Negative for arthralgias, back pain, gait problem and myalgias  Skin: Negative for rash  Neurological: Negative for dizziness, speech difficulty, weakness, numbness and headaches  Hematological: Does not bruise/bleed easily  Psychiatric/Behavioral: Negative for agitation, confusion and hallucinations  Objective:     Physical Exam  Vitals signs and nursing note reviewed  Constitutional:       Appearance: She is well-developed  HENT:      Head: Normocephalic and atraumatic  Nose: Nose normal    Eyes:      General: No scleral icterus  Conjunctiva/sclera: Conjunctivae normal       Pupils: Pupils are equal, round, and reactive to light  Neck:      Musculoskeletal: Normal range of motion and neck supple  Thyroid: No thyromegaly     Pulmonary:      Effort: Pulmonary effort is normal       Breath sounds: Normal breath sounds  No wheezing  Abdominal:      General: Bowel sounds are normal  There is no distension  Palpations: Abdomen is soft  Tenderness: There is no abdominal tenderness  There is no guarding or rebound  Musculoskeletal: Normal range of motion  General: No tenderness or deformity  Skin:     General: Skin is warm and dry  Findings: No erythema or rash  Neurological:      Mental Status: She is alert and oriented to person, place, and time  Sensory: No sensory deficit  Psychiatric:         Behavior: Behavior normal          Thought Content:  Thought content normal          Judgment: Judgment normal

## 2020-08-27 ENCOUNTER — TELEPHONE (OUTPATIENT)
Dept: SURGERY | Facility: CLINIC | Age: 55
End: 2020-08-27

## 2020-08-27 DIAGNOSIS — R18.8 ASCITES: Primary | ICD-10-CM

## 2020-08-27 NOTE — TELEPHONE ENCOUNTER
Dr Stacy Mirza stated the patient needs this test and to call the patient when it is in  I left a message for the patient

## 2020-09-04 ENCOUNTER — TELEPHONE (OUTPATIENT)
Dept: FAMILY MEDICINE CLINIC | Facility: CLINIC | Age: 55
End: 2020-09-04
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
## 2020-09-04 NOTE — TELEPHONE ENCOUNTER
Pt called back and scheduled appointment for 10/9 and would like to have b/w done prior to appointment due to having symptoms of lymes starting again

## 2020-09-04 NOTE — TELEPHONE ENCOUNTER
Patient is having symptoms of the lymes disease again - muscle aches and fatigue - and is wondering if she can get an order for lab work to be done to check if her lymes is back or if she will need an office visit  She did just see you on 8/20/20 but it was not for that

## 2020-09-17 ENCOUNTER — APPOINTMENT (OUTPATIENT)
Dept: LAB | Facility: MEDICAL CENTER | Age: 55
End: 2020-09-17
Payer: COMMERCIAL
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
## 2020-09-17 PROCEDURE — 86618 LYME DISEASE ANTIBODY: CPT

## 2020-09-17 PROCEDURE — 36415 COLL VENOUS BLD VENIPUNCTURE: CPT

## 2020-09-18 LAB — B BURGDOR IGG+IGM SER-ACNC: <0.91 ISR (ref 0–0.9)

## 2020-11-27 ENCOUNTER — LAB (OUTPATIENT)
Dept: LAB | Facility: MEDICAL CENTER | Age: 55
End: 2020-11-27
Payer: COMMERCIAL

## 2020-11-27 DIAGNOSIS — E78.2 MIXED HYPERLIPIDEMIA: ICD-10-CM

## 2020-11-27 LAB
CHOLEST SERPL-MCNC: 200 MG/DL (ref 50–200)
HDLC SERPL-MCNC: 111 MG/DL
LDLC SERPL CALC-MCNC: 79 MG/DL (ref 0–100)
TRIGL SERPL-MCNC: 49 MG/DL

## 2020-11-27 PROCEDURE — 80061 LIPID PANEL: CPT

## 2020-11-27 PROCEDURE — 36415 COLL VENOUS BLD VENIPUNCTURE: CPT

## 2020-12-02 ENCOUNTER — CONSULT (OUTPATIENT)
Dept: SURGERY | Facility: CLINIC | Age: 55
End: 2020-12-02
Payer: COMMERCIAL

## 2020-12-02 VITALS
HEIGHT: 61 IN | SYSTOLIC BLOOD PRESSURE: 120 MMHG | WEIGHT: 110.2 LBS | HEART RATE: 68 BPM | DIASTOLIC BLOOD PRESSURE: 60 MMHG | TEMPERATURE: 96.6 F | BODY MASS INDEX: 20.81 KG/M2 | RESPIRATION RATE: 16 BRPM

## 2020-12-02 DIAGNOSIS — K42.9 UMBILICAL HERNIA WITHOUT OBSTRUCTION OR GANGRENE: Primary | ICD-10-CM

## 2020-12-02 PROCEDURE — 1036F TOBACCO NON-USER: CPT | Performed by: SURGERY

## 2020-12-02 PROCEDURE — 99243 OFF/OP CNSLTJ NEW/EST LOW 30: CPT | Performed by: SURGERY

## 2020-12-04 ENCOUNTER — LAB (OUTPATIENT)
Dept: LAB | Facility: MEDICAL CENTER | Age: 55
End: 2020-12-04
Payer: COMMERCIAL

## 2020-12-04 DIAGNOSIS — R10.84 GENERALIZED ABDOMINAL PAIN: ICD-10-CM

## 2020-12-04 LAB
ALBUMIN SERPL BCP-MCNC: 3.7 G/DL (ref 3.5–5)
ALP SERPL-CCNC: 98 U/L (ref 46–116)
ALT SERPL W P-5'-P-CCNC: 29 U/L (ref 12–78)
ANION GAP SERPL CALCULATED.3IONS-SCNC: 3 MMOL/L (ref 4–13)
AST SERPL W P-5'-P-CCNC: 26 U/L (ref 5–45)
BASOPHILS # BLD AUTO: 0.03 THOUSANDS/ΜL (ref 0–0.1)
BASOPHILS NFR BLD AUTO: 1 % (ref 0–1)
BILIRUB SERPL-MCNC: 0.71 MG/DL (ref 0.2–1)
BUN SERPL-MCNC: 23 MG/DL (ref 5–25)
CALCIUM SERPL-MCNC: 9 MG/DL (ref 8.3–10.1)
CHLORIDE SERPL-SCNC: 103 MMOL/L (ref 100–108)
CO2 SERPL-SCNC: 30 MMOL/L (ref 21–32)
CREAT SERPL-MCNC: 0.74 MG/DL (ref 0.6–1.3)
EOSINOPHIL # BLD AUTO: 0.09 THOUSAND/ΜL (ref 0–0.61)
EOSINOPHIL NFR BLD AUTO: 2 % (ref 0–6)
ERYTHROCYTE [DISTWIDTH] IN BLOOD BY AUTOMATED COUNT: 12.8 % (ref 11.6–15.1)
GFR SERPL CREATININE-BSD FRML MDRD: 90 ML/MIN/1.73SQ M
GLUCOSE P FAST SERPL-MCNC: 86 MG/DL (ref 65–99)
HCT VFR BLD AUTO: 38.7 % (ref 34.8–46.1)
HGB BLD-MCNC: 13 G/DL (ref 11.5–15.4)
IMM GRANULOCYTES # BLD AUTO: 0.01 THOUSAND/UL (ref 0–0.2)
IMM GRANULOCYTES NFR BLD AUTO: 0 % (ref 0–2)
LYMPHOCYTES # BLD AUTO: 1.73 THOUSANDS/ΜL (ref 0.6–4.47)
LYMPHOCYTES NFR BLD AUTO: 42 % (ref 14–44)
MCH RBC QN AUTO: 32.7 PG (ref 26.8–34.3)
MCHC RBC AUTO-ENTMCNC: 33.6 G/DL (ref 31.4–37.4)
MCV RBC AUTO: 97 FL (ref 82–98)
MONOCYTES # BLD AUTO: 0.45 THOUSAND/ΜL (ref 0.17–1.22)
MONOCYTES NFR BLD AUTO: 11 % (ref 4–12)
NEUTROPHILS # BLD AUTO: 1.79 THOUSANDS/ΜL (ref 1.85–7.62)
NEUTS SEG NFR BLD AUTO: 44 % (ref 43–75)
NRBC BLD AUTO-RTO: 0 /100 WBCS
PLATELET # BLD AUTO: 269 THOUSANDS/UL (ref 149–390)
PMV BLD AUTO: 10.6 FL (ref 8.9–12.7)
POTASSIUM SERPL-SCNC: 4.1 MMOL/L (ref 3.5–5.3)
PROT SERPL-MCNC: 7 G/DL (ref 6.4–8.2)
RBC # BLD AUTO: 3.98 MILLION/UL (ref 3.81–5.12)
SODIUM SERPL-SCNC: 136 MMOL/L (ref 136–145)
WBC # BLD AUTO: 4.1 THOUSAND/UL (ref 4.31–10.16)

## 2020-12-04 PROCEDURE — 80053 COMPREHEN METABOLIC PANEL: CPT

## 2020-12-04 PROCEDURE — 85025 COMPLETE CBC W/AUTO DIFF WBC: CPT

## 2020-12-04 PROCEDURE — 36415 COLL VENOUS BLD VENIPUNCTURE: CPT

## 2020-12-09 ENCOUNTER — HOSPITAL ENCOUNTER (OUTPATIENT)
Dept: NUCLEAR MEDICINE | Facility: HOSPITAL | Age: 55
Discharge: HOME | End: 2020-12-09
Attending: SURGERY
Payer: COMMERCIAL

## 2020-12-09 DIAGNOSIS — R18.8 ASCITES: ICD-10-CM

## 2020-12-09 DIAGNOSIS — R10.11 RUQ ABDOMINAL PAIN: ICD-10-CM

## 2020-12-09 PROCEDURE — A9537 TC99M MEBROFENIN: HCPCS

## 2020-12-09 PROCEDURE — 78227 HEPATOBIL SYST IMAGE W/DRUG: CPT

## 2020-12-09 PROCEDURE — G1004 CDSM NDSC: HCPCS

## 2020-12-09 RX ADMIN — SINCALIDE 1 MCG: 5 INJECTION, POWDER, LYOPHILIZED, FOR SOLUTION INTRAVENOUS at 14:27

## 2020-12-21 ENCOUNTER — ANESTHESIA EVENT (OUTPATIENT)
Dept: PERIOP | Facility: HOSPITAL | Age: 55
End: 2020-12-21
Payer: COMMERCIAL

## 2020-12-22 ENCOUNTER — ANESTHESIA (OUTPATIENT)
Dept: PERIOP | Facility: HOSPITAL | Age: 55
End: 2020-12-22
Payer: COMMERCIAL

## 2020-12-22 ENCOUNTER — HOSPITAL ENCOUNTER (OUTPATIENT)
Facility: HOSPITAL | Age: 55
Setting detail: OUTPATIENT SURGERY
Discharge: HOME | End: 2020-12-23
Attending: OBSTETRICS & GYNECOLOGY | Admitting: OBSTETRICS & GYNECOLOGY
Payer: COMMERCIAL

## 2020-12-22 VITALS
HEART RATE: 61 BPM | WEIGHT: 113 LBS | TEMPERATURE: 98.9 F | BODY MASS INDEX: 21.34 KG/M2 | RESPIRATION RATE: 16 BRPM | OXYGEN SATURATION: 99 % | HEIGHT: 61 IN | SYSTOLIC BLOOD PRESSURE: 106 MMHG | DIASTOLIC BLOOD PRESSURE: 53 MMHG

## 2020-12-22 VITALS — HEART RATE: 60 BPM

## 2020-12-22 DIAGNOSIS — N39.3 FEMALE STRESS INCONTINENCE: ICD-10-CM

## 2020-12-22 DIAGNOSIS — K42.9 UMBILICAL HERNIA WITHOUT OBSTRUCTION AND WITHOUT GANGRENE: Primary | ICD-10-CM

## 2020-12-22 DIAGNOSIS — N81.11 CYSTOCELE, MIDLINE: ICD-10-CM

## 2020-12-22 DIAGNOSIS — N36.41 URETHRAL HYPERMOBILITY: ICD-10-CM

## 2020-12-22 DIAGNOSIS — K42.9 UMBILICAL HERNIA WITHOUT OBSTRUCTION OR GANGRENE: ICD-10-CM

## 2020-12-22 DIAGNOSIS — K80.80 OTHER CHOLELITHIASIS WITHOUT OBSTRUCTION: ICD-10-CM

## 2020-12-22 PROCEDURE — 88304 TISSUE EXAM BY PATHOLOGIST: CPT | Performed by: PATHOLOGY

## 2020-12-22 PROCEDURE — C1771 REP DEV, URINARY, W/SLING: HCPCS | Performed by: OBSTETRICS & GYNECOLOGY

## 2020-12-22 PROCEDURE — 47562 LAPAROSCOPIC CHOLECYSTECTOMY: CPT | Performed by: SURGERY

## 2020-12-22 PROCEDURE — 51798 US URINE CAPACITY MEASURE: CPT | Performed by: OBSTETRICS & GYNECOLOGY

## 2020-12-22 DEVICE — SINGLE INCISION SLING SYSTEM
Type: IMPLANTABLE DEVICE | Site: VAGINA | Status: FUNCTIONAL
Brand: ALTIS

## 2020-12-22 RX ORDER — VECURONIUM BROMIDE 1 MG/ML
INJECTION, POWDER, LYOPHILIZED, FOR SOLUTION INTRAVENOUS AS NEEDED
Status: DISCONTINUED | OUTPATIENT
Start: 2020-12-22 | End: 2020-12-22

## 2020-12-22 RX ORDER — KETOROLAC TROMETHAMINE 30 MG/ML
INJECTION, SOLUTION INTRAMUSCULAR; INTRAVENOUS AS NEEDED
Status: DISCONTINUED | OUTPATIENT
Start: 2020-12-22 | End: 2020-12-22

## 2020-12-22 RX ORDER — ONDANSETRON 2 MG/ML
INJECTION INTRAMUSCULAR; INTRAVENOUS AS NEEDED
Status: DISCONTINUED | OUTPATIENT
Start: 2020-12-22 | End: 2020-12-22

## 2020-12-22 RX ORDER — ONDANSETRON 2 MG/ML
4 INJECTION INTRAMUSCULAR; INTRAVENOUS ONCE
Status: COMPLETED | OUTPATIENT
Start: 2020-12-22 | End: 2020-12-22

## 2020-12-22 RX ORDER — SODIUM CHLORIDE, SODIUM LACTATE, POTASSIUM CHLORIDE, CALCIUM CHLORIDE 600; 310; 30; 20 MG/100ML; MG/100ML; MG/100ML; MG/100ML
125 INJECTION, SOLUTION INTRAVENOUS CONTINUOUS
Status: DISCONTINUED | OUTPATIENT
Start: 2020-12-22 | End: 2020-12-23 | Stop reason: HOSPADM

## 2020-12-22 RX ORDER — GLYCOPYRROLATE 0.2 MG/ML
INJECTION INTRAMUSCULAR; INTRAVENOUS AS NEEDED
Status: DISCONTINUED | OUTPATIENT
Start: 2020-12-22 | End: 2020-12-22

## 2020-12-22 RX ORDER — HYDROMORPHONE HCL/PF 1 MG/ML
0.5 SYRINGE (ML) INJECTION
Status: DISCONTINUED | OUTPATIENT
Start: 2020-12-22 | End: 2020-12-22 | Stop reason: HOSPADM

## 2020-12-22 RX ORDER — FENTANYL CITRATE 50 UG/ML
INJECTION, SOLUTION INTRAMUSCULAR; INTRAVENOUS AS NEEDED
Status: DISCONTINUED | OUTPATIENT
Start: 2020-12-22 | End: 2020-12-22

## 2020-12-22 RX ORDER — HYDROCODONE BITARTRATE AND ACETAMINOPHEN 5; 325 MG/1; MG/1
1 TABLET ORAL EVERY 6 HOURS PRN
Qty: 20 TABLET | Refills: 0 | Status: SHIPPED | OUTPATIENT
Start: 2020-12-22 | End: 2021-01-01

## 2020-12-22 RX ORDER — NEOSTIGMINE METHYLSULFATE 1 MG/ML
INJECTION INTRAVENOUS AS NEEDED
Status: DISCONTINUED | OUTPATIENT
Start: 2020-12-22 | End: 2020-12-22

## 2020-12-22 RX ORDER — FUROSEMIDE 10 MG/ML
INJECTION INTRAMUSCULAR; INTRAVENOUS AS NEEDED
Status: DISCONTINUED | OUTPATIENT
Start: 2020-12-22 | End: 2020-12-22

## 2020-12-22 RX ORDER — CEFAZOLIN SODIUM 2 G/50ML
2000 SOLUTION INTRAVENOUS EVERY 8 HOURS
Status: DISCONTINUED | OUTPATIENT
Start: 2020-12-22 | End: 2020-12-22 | Stop reason: HOSPADM

## 2020-12-22 RX ORDER — SCOLOPAMINE TRANSDERMAL SYSTEM 1 MG/1
1 PATCH, EXTENDED RELEASE TRANSDERMAL ONCE AS NEEDED
Status: DISCONTINUED | OUTPATIENT
Start: 2020-12-22 | End: 2020-12-23 | Stop reason: HOSPADM

## 2020-12-22 RX ORDER — PROMETHAZINE HYDROCHLORIDE 25 MG/ML
6.25 INJECTION, SOLUTION INTRAMUSCULAR; INTRAVENOUS ONCE AS NEEDED
Status: DISCONTINUED | OUTPATIENT
Start: 2020-12-22 | End: 2020-12-23 | Stop reason: HOSPADM

## 2020-12-22 RX ORDER — MIDAZOLAM HYDROCHLORIDE 2 MG/2ML
INJECTION, SOLUTION INTRAMUSCULAR; INTRAVENOUS AS NEEDED
Status: DISCONTINUED | OUTPATIENT
Start: 2020-12-22 | End: 2020-12-22

## 2020-12-22 RX ORDER — HYDROMORPHONE HCL 110MG/55ML
PATIENT CONTROLLED ANALGESIA SYRINGE INTRAVENOUS AS NEEDED
Status: DISCONTINUED | OUTPATIENT
Start: 2020-12-22 | End: 2020-12-22

## 2020-12-22 RX ORDER — PROPOFOL 10 MG/ML
INJECTION, EMULSION INTRAVENOUS AS NEEDED
Status: DISCONTINUED | OUTPATIENT
Start: 2020-12-22 | End: 2020-12-22

## 2020-12-22 RX ORDER — BUPIVACAINE HYDROCHLORIDE 2.5 MG/ML
INJECTION, SOLUTION EPIDURAL; INFILTRATION; INTRACAUDAL AS NEEDED
Status: DISCONTINUED | OUTPATIENT
Start: 2020-12-22 | End: 2020-12-22 | Stop reason: HOSPADM

## 2020-12-22 RX ORDER — FENTANYL CITRATE/PF 50 MCG/ML
25 SYRINGE (ML) INJECTION
Status: DISCONTINUED | OUTPATIENT
Start: 2020-12-22 | End: 2020-12-22 | Stop reason: HOSPADM

## 2020-12-22 RX ORDER — MEPERIDINE HYDROCHLORIDE 25 MG/ML
12.5 INJECTION INTRAMUSCULAR; INTRAVENOUS; SUBCUTANEOUS
Status: DISCONTINUED | OUTPATIENT
Start: 2020-12-22 | End: 2020-12-22 | Stop reason: HOSPADM

## 2020-12-22 RX ORDER — HYDROCODONE BITARTRATE AND ACETAMINOPHEN 5; 325 MG/1; MG/1
1 TABLET ORAL EVERY 4 HOURS PRN
Status: DISCONTINUED | OUTPATIENT
Start: 2020-12-22 | End: 2020-12-23 | Stop reason: HOSPADM

## 2020-12-22 RX ORDER — DEXAMETHASONE SODIUM PHOSPHATE 10 MG/ML
INJECTION, SOLUTION INTRAMUSCULAR; INTRAVENOUS AS NEEDED
Status: DISCONTINUED | OUTPATIENT
Start: 2020-12-22 | End: 2020-12-22

## 2020-12-22 RX ORDER — HYDROCODONE BITARTRATE AND ACETAMINOPHEN 5; 325 MG/1; MG/1
2 TABLET ORAL EVERY 6 HOURS PRN
Status: DISCONTINUED | OUTPATIENT
Start: 2020-12-22 | End: 2020-12-23 | Stop reason: HOSPADM

## 2020-12-22 RX ORDER — ONDANSETRON 2 MG/ML
4 INJECTION INTRAMUSCULAR; INTRAVENOUS ONCE AS NEEDED
Status: DISCONTINUED | OUTPATIENT
Start: 2020-12-22 | End: 2020-12-22 | Stop reason: HOSPADM

## 2020-12-22 RX ADMIN — DEXAMETHASONE SODIUM PHOSPHATE 4 MG: 10 INJECTION, SOLUTION INTRAMUSCULAR; INTRAVENOUS at 15:33

## 2020-12-22 RX ADMIN — FENTANYL CITRATE 25 MCG: 50 INJECTION, SOLUTION INTRAMUSCULAR; INTRAVENOUS at 16:50

## 2020-12-22 RX ADMIN — FENTANYL CITRATE 25 MCG: 50 INJECTION, SOLUTION INTRAMUSCULAR; INTRAVENOUS at 15:42

## 2020-12-22 RX ADMIN — FENTANYL CITRATE 25 MCG: 50 INJECTION INTRAMUSCULAR; INTRAVENOUS at 19:26

## 2020-12-22 RX ADMIN — HYDROMORPHONE HYDROCHLORIDE 0.5 MG: 2 INJECTION INTRAMUSCULAR; INTRAVENOUS; SUBCUTANEOUS at 16:54

## 2020-12-22 RX ADMIN — MIDAZOLAM 2 MG: 1 INJECTION INTRAMUSCULAR; INTRAVENOUS at 15:18

## 2020-12-22 RX ADMIN — GLYCOPYRROLATE 0.4 MG: 0.2 INJECTION, SOLUTION INTRAMUSCULAR; INTRAVENOUS at 17:32

## 2020-12-22 RX ADMIN — NEOSTIGMINE METHYLSULFATE 3 MG: 1 INJECTION, SOLUTION INTRAVENOUS at 18:13

## 2020-12-22 RX ADMIN — VECURONIUM BROMIDE 5 MG: 1 INJECTION, POWDER, LYOPHILIZED, FOR SOLUTION INTRAVENOUS at 15:23

## 2020-12-22 RX ADMIN — SODIUM CHLORIDE, SODIUM LACTATE, POTASSIUM CHLORIDE, AND CALCIUM CHLORIDE: .6; .31; .03; .02 INJECTION, SOLUTION INTRAVENOUS at 18:10

## 2020-12-22 RX ADMIN — SODIUM CHLORIDE, SODIUM LACTATE, POTASSIUM CHLORIDE, AND CALCIUM CHLORIDE: .6; .31; .03; .02 INJECTION, SOLUTION INTRAVENOUS at 15:34

## 2020-12-22 RX ADMIN — SCOPALAMINE 1 PATCH: 1 PATCH, EXTENDED RELEASE TRANSDERMAL at 13:19

## 2020-12-22 RX ADMIN — FENTANYL CITRATE 25 MCG: 50 INJECTION INTRAMUSCULAR; INTRAVENOUS at 19:20

## 2020-12-22 RX ADMIN — VECURONIUM BROMIDE 3 MG: 1 INJECTION, POWDER, LYOPHILIZED, FOR SOLUTION INTRAVENOUS at 17:23

## 2020-12-22 RX ADMIN — VECURONIUM BROMIDE 3 MG: 1 INJECTION, POWDER, LYOPHILIZED, FOR SOLUTION INTRAVENOUS at 16:50

## 2020-12-22 RX ADMIN — SODIUM CHLORIDE, SODIUM LACTATE, POTASSIUM CHLORIDE, AND CALCIUM CHLORIDE: .6; .31; .03; .02 INJECTION, SOLUTION INTRAVENOUS at 16:50

## 2020-12-22 RX ADMIN — ONDANSETRON 4 MG: 2 INJECTION INTRAMUSCULAR; INTRAVENOUS at 21:04

## 2020-12-22 RX ADMIN — VECURONIUM BROMIDE 2 MG: 1 INJECTION, POWDER, LYOPHILIZED, FOR SOLUTION INTRAVENOUS at 15:58

## 2020-12-22 RX ADMIN — KETOROLAC TROMETHAMINE 15 MG: 30 INJECTION, SOLUTION INTRAMUSCULAR at 18:13

## 2020-12-22 RX ADMIN — SODIUM CHLORIDE, SODIUM LACTATE, POTASSIUM CHLORIDE, AND CALCIUM CHLORIDE 125 ML/HR: .6; .31; .03; .02 INJECTION, SOLUTION INTRAVENOUS at 13:00

## 2020-12-22 RX ADMIN — PROPOFOL 50 MG: 10 INJECTION, EMULSION INTRAVENOUS at 16:50

## 2020-12-22 RX ADMIN — PROPOFOL 150 MG: 10 INJECTION, EMULSION INTRAVENOUS at 15:18

## 2020-12-22 RX ADMIN — FENTANYL CITRATE 50 MCG: 50 INJECTION, SOLUTION INTRAMUSCULAR; INTRAVENOUS at 15:18

## 2020-12-22 RX ADMIN — ONDANSETRON 4 MG: 2 INJECTION INTRAMUSCULAR; INTRAVENOUS at 15:34

## 2020-12-22 RX ADMIN — GLYCOPYRROLATE 0.4 MG: 0.2 INJECTION, SOLUTION INTRAMUSCULAR; INTRAVENOUS at 18:13

## 2020-12-22 RX ADMIN — FUROSEMIDE 10 MG: 10 INJECTION, SOLUTION INTRAMUSCULAR; INTRAVENOUS at 16:39

## 2020-12-22 RX ADMIN — CEFAZOLIN SODIUM 2000 MG: 2 SOLUTION INTRAVENOUS at 15:05

## 2021-01-06 ENCOUNTER — OFFICE VISIT (OUTPATIENT)
Dept: SURGERY | Facility: CLINIC | Age: 56
End: 2021-01-06

## 2021-01-06 VITALS — BODY MASS INDEX: 19.98 KG/M2 | HEIGHT: 61 IN | WEIGHT: 105.8 LBS | TEMPERATURE: 97.8 F

## 2021-01-06 DIAGNOSIS — Z90.49 STATUS POST LAPAROSCOPIC CHOLECYSTECTOMY: Primary | ICD-10-CM

## 2021-01-06 PROBLEM — K42.9 UMBILICAL HERNIA WITHOUT OBSTRUCTION AND WITHOUT GANGRENE: Status: RESOLVED | Noted: 2020-08-11 | Resolved: 2021-01-06

## 2021-01-06 PROCEDURE — 99024 POSTOP FOLLOW-UP VISIT: CPT | Performed by: SURGERY

## 2021-01-06 PROCEDURE — 3008F BODY MASS INDEX DOCD: CPT | Performed by: SURGERY

## 2021-01-06 NOTE — PROGRESS NOTES
Assessment/Plan:    Status post laparoscopic cholecystectomy    Overall she has done very well  Had very minimal pain postoperatively  Has some discomfort at her umbilical incision  Denies nausea vomiting fevers or chills  Her incisions are well healed  The glue was removed  She has 2 more weeks of heavy lifting restrictions  Follow up p r n  the pathology revealed mild cholecystitis       Diagnoses and all orders for this visit:    Status post laparoscopic cholecystectomy          Subjective:      Patient ID: Rosi Mckeon is a 60 y o  female      HPI        Review of Systems      Objective:      Temp 97 8 °F (36 6 °C)   Ht 5' 1" (1 549 m)   Wt 48 kg (105 lb 12 8 oz)   BMI 19 99 kg/m²          Physical Exam

## 2021-01-06 NOTE — ASSESSMENT & PLAN NOTE
Overall she has done very well  Had very minimal pain postoperatively  Has some discomfort at her umbilical incision  Denies nausea vomiting fevers or chills  Her incisions are well healed  The glue was removed  She has 2 more weeks of heavy lifting restrictions    Follow up p r n  the pathology revealed mild cholecystitis

## 2021-03-11 DIAGNOSIS — N39.44 BED WETTING: ICD-10-CM

## 2021-03-11 RX ORDER — DESMOPRESSIN ACETATE 0.2 MG/1
TABLET ORAL
Qty: 180 TABLET | Refills: 3 | Status: SHIPPED | OUTPATIENT
Start: 2021-03-11 | End: 2021-12-23 | Stop reason: SDUPTHER

## 2021-03-26 DIAGNOSIS — Z23 ENCOUNTER FOR IMMUNIZATION: ICD-10-CM

## 2021-03-31 ENCOUNTER — IMMUNIZATIONS (OUTPATIENT)
Dept: FAMILY MEDICINE CLINIC | Facility: HOSPITAL | Age: 56
End: 2021-03-31

## 2021-03-31 DIAGNOSIS — Z23 ENCOUNTER FOR IMMUNIZATION: Primary | ICD-10-CM

## 2021-03-31 PROCEDURE — 91300 SARS-COV-2 / COVID-19 MRNA VACCINE (PFIZER-BIONTECH) 30 MCG: CPT

## 2021-03-31 PROCEDURE — 0001A SARS-COV-2 / COVID-19 MRNA VACCINE (PFIZER-BIONTECH) 30 MCG: CPT

## 2021-04-21 ENCOUNTER — IMMUNIZATIONS (OUTPATIENT)
Dept: FAMILY MEDICINE CLINIC | Facility: HOSPITAL | Age: 56
End: 2021-04-21

## 2021-04-21 DIAGNOSIS — Z23 ENCOUNTER FOR IMMUNIZATION: Primary | ICD-10-CM

## 2021-04-21 PROCEDURE — 0002A SARS-COV-2 / COVID-19 MRNA VACCINE (PFIZER-BIONTECH) 30 MCG: CPT

## 2021-04-21 PROCEDURE — 91300 SARS-COV-2 / COVID-19 MRNA VACCINE (PFIZER-BIONTECH) 30 MCG: CPT

## 2021-06-23 ENCOUNTER — RA CDI HCC (OUTPATIENT)
Dept: OTHER | Facility: HOSPITAL | Age: 56
End: 2021-06-23

## 2021-06-23 NOTE — PROGRESS NOTES
[facility] 73  coding opportunities          Chart reviewed, no opportunity found: CHART REVIEWED, NO OPPORTUNITY FOUND                     Patients insurance company: [facility] (Medicare Advantage and Commercial)

## 2021-06-30 ENCOUNTER — OFFICE VISIT (OUTPATIENT)
Dept: FAMILY MEDICINE CLINIC | Facility: CLINIC | Age: 56
End: 2021-06-30
Payer: COMMERCIAL

## 2021-06-30 VITALS
RESPIRATION RATE: 16 BRPM | SYSTOLIC BLOOD PRESSURE: 122 MMHG | HEART RATE: 58 BPM | WEIGHT: 113 LBS | HEIGHT: 61 IN | OXYGEN SATURATION: 98 % | BODY MASS INDEX: 21.34 KG/M2 | DIASTOLIC BLOOD PRESSURE: 68 MMHG | TEMPERATURE: 98.2 F

## 2021-06-30 DIAGNOSIS — N39.44 BED WETTING: ICD-10-CM

## 2021-06-30 DIAGNOSIS — Z12.31 SCREENING MAMMOGRAM FOR HIGH-RISK PATIENT: ICD-10-CM

## 2021-06-30 DIAGNOSIS — Z11.59 NEED FOR HEPATITIS C SCREENING TEST: ICD-10-CM

## 2021-06-30 DIAGNOSIS — M25.50 ARTHRALGIA, UNSPECIFIED JOINT: ICD-10-CM

## 2021-06-30 DIAGNOSIS — Z00.00 WELL ADULT EXAM: Primary | ICD-10-CM

## 2021-06-30 DIAGNOSIS — S03.00XA DISLOCATION OF TEMPOROMANDIBULAR JOINT, INITIAL ENCOUNTER: ICD-10-CM

## 2021-06-30 DIAGNOSIS — R53.83 OTHER FATIGUE: ICD-10-CM

## 2021-06-30 DIAGNOSIS — Z12.4 SCREENING FOR CERVICAL CANCER: ICD-10-CM

## 2021-06-30 DIAGNOSIS — Z13.220 SCREENING CHOLESTEROL LEVEL: ICD-10-CM

## 2021-06-30 DIAGNOSIS — L23.7 POISON IVY: ICD-10-CM

## 2021-06-30 DIAGNOSIS — Z11.4 SCREENING FOR HIV (HUMAN IMMUNODEFICIENCY VIRUS): ICD-10-CM

## 2021-06-30 PROCEDURE — 3008F BODY MASS INDEX DOCD: CPT | Performed by: FAMILY MEDICINE

## 2021-06-30 PROCEDURE — 1036F TOBACCO NON-USER: CPT | Performed by: FAMILY MEDICINE

## 2021-06-30 PROCEDURE — 99396 PREV VISIT EST AGE 40-64: CPT | Performed by: FAMILY MEDICINE

## 2021-06-30 RX ORDER — TRIAMCINOLONE ACETONIDE 1 MG/G
CREAM TOPICAL 2 TIMES DAILY
Qty: 30 G | Refills: 1 | Status: SHIPPED | OUTPATIENT
Start: 2021-06-30 | End: 2021-12-21

## 2021-06-30 NOTE — PROGRESS NOTES
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 and this will aggravate this condition as well as chewing gum  Discussed with her about using her jaw less getting some anti-inflammatories and seeking advice from a oral surgeon for dentist was not helpful for this  Patient is due for her lab work we will order her lab work for next time  The following portions of the patient's history were reviewed and updated as appropriate:   Past Medical History:  She has a past medical history of Breast cyst, Cholelithiasis, Cystocele, midline, Frequent urination, H/O bilateral breast implants, Headache, Hyperlipidemia, Lyme disease, Mild acid reflux, PONV (postoperative nausea and vomiting), 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, Spider veins of both lower extremities, Umbilical hernia, Umbilical hernia without obstruction and without gangrene (8/11/2020), and Wears glasses  ,  _______________________________________________________________________  Medical Problems:  does not have any pertinent problems on file ,  _______________________________________________________________________  Past Surgical History:   has a past surgical history that includes Colonoscopy (2014);  Augmentation mammaplasty (Bilateral, 2014); EGD; pr cmbnd anterpost colporraphy w/cysto (N/A, 12/22/2020); pr sling oper stres incontinence (N/A, 12/22/2020); pr cystourethroscopy (N/A, 12/22/2020); pr repair umbilical AQCL,7+G/J,UORWI (N/A, 12/22/2020); and pr megan,cholecystectomy (N/A, 12/22/2020)  ,  _______________________________________________________________________  Family History:  family history includes Diabetes in her father; Hypertension in her mother; No Known Problems in her sister, sister, sister, and sister; Other in her son ,  _______________________________________________________________________  Social History:   reports that she has never smoked  She has never used smokeless tobacco  She reports that she does not drink alcohol and does not use drugs  ,  _______________________________________________________________________  Allergies:  has No Known Allergies     _______________________________________________________________________  Current Outpatient Medications   Medication Sig Dispense Refill    Ascorbic Acid (VITAMIN C PO) Take by mouth daily       Bilberry, Vaccinium myrtillus, (BILBERRY EXTRACT PO) Take by mouth daily       BIOTIN PO Take by mouth daily       Calcium Carbonate-Vitamin D (CALCIUM-D PO) Take by mouth daily       COLLAGEN PO Take by mouth daily       Cyanocobalamin (VITAMIN B-12 PO) Take by mouth daily       desmopressin (DDAVP) 0 2 mg tablet TAKE 1 TABLET TWICE A  tablet 3    estradiol (ESTRACE) 0 1 mg/g vaginal cream Insert 1 g into the vagina 2 (two) times a week for 90 doses 42 5 g 1    GLUCOSAMINE-CHONDROITIN PO Take by mouth daily       MELATONIN PO Take by mouth daily at bedtime as needed       Misc Natural Products (TART CHERRY ADVANCED PO) Take by mouth daily       Misc Natural Products (Turmeric Curcumin) CAPS Take by mouth daily       Multiple Vitamin (DAILY VITAMIN) tablet Take 1 tablet by mouth daily       Multiple Vitamins-Minerals (HAIR VITAMINS PO) Take by mouth daily       NON FORMULARY luti-gold OTC daily/ viviscalpro OTC daily      Omega-3 Fatty Acids (FISH OIL PO) Take by mouth daily       PAPAYA PO Take by mouth daily       Pomegranate, [place] granatum, (POMEGRANATE PO) Take by mouth daily       Probiotic Product (PROBIOTIC DAILY PO) Take by mouth daily       Pyridoxine HCl (VITAMIN B6 PO) Take by mouth daily       Red Yeast Rice Extract (RED YEAST RICE PO) Take by mouth daily       triamcinolone (KENALOG) 0 1 % cream Apply topically 2 (two) times a day 30 g 0    VITAMIN D PO Take by mouth daily        No current facility-administered medications for this visit      _______________________________________________________________________  Review of Systems   Constitutional: Negative for activity change, appetite change, fatigue and fever  HENT: Negative for congestion, ear pain, postnasal drip, rhinorrhea, sinus pressure, sinus pain, sneezing and sore throat  Eyes: Negative for pain and redness  Respiratory: Negative for apnea, cough, chest tightness, shortness of breath and wheezing  Cardiovascular: Negative for chest pain, palpitations and leg swelling  Gastrointestinal: Negative for abdominal pain, constipation, diarrhea, nausea and vomiting  Endocrine: Negative for cold intolerance and heat intolerance  Genitourinary: Negative for difficulty urinating, dysuria, frequency, hematuria and urgency  Musculoskeletal: Negative for arthralgias, back pain, gait problem and myalgias  Skin: Negative for rash  Neurological: Negative for dizziness, speech difficulty, weakness, numbness and headaches  Hematological: Does not bruise/bleed easily  Psychiatric/Behavioral: Negative for agitation, confusion and hallucinations  Objective: There were no vitals filed for this visit  There is no height or weight on file to calculate BMI  Physical Exam  Vitals and nursing note reviewed  Constitutional:       Appearance: She is well-developed  HENT:      Head: Normocephalic and atraumatic        Nose: Nose normal       Mouth/Throat:      Mouth: Mucous membranes are moist    Eyes:      General: No scleral icterus  Conjunctiva/sclera: Conjunctivae normal       Pupils: Pupils are equal, round, and reactive to light  Neck:      Thyroid: No thyromegaly  Cardiovascular:      Rate and Rhythm: Normal rate and regular rhythm  Pulmonary:      Effort: Pulmonary effort is normal       Breath sounds: Normal breath sounds  No wheezing  Abdominal:      General: Bowel sounds are normal  There is no distension  Palpations: Abdomen is soft  Tenderness: There is no abdominal tenderness  There is no guarding or rebound  Musculoskeletal:         General: No tenderness or deformity  Normal range of motion  Cervical back: Normal range of motion and neck supple  Skin:     General: Skin is warm and dry  Findings: No erythema or rash  Neurological:      Mental Status: She is alert and oriented to person, place, and time  Sensory: No sensory deficit  Psychiatric:         Mood and Affect: Mood normal          Behavior: Behavior normal          Thought Content:  Thought content normal          Judgment: Judgment normal

## 2021-07-26 ENCOUNTER — ANNUAL EXAM (OUTPATIENT)
Dept: OBGYN CLINIC | Facility: MEDICAL CENTER | Age: 56
End: 2021-07-26
Payer: COMMERCIAL

## 2021-07-26 VITALS
WEIGHT: 115 LBS | BODY MASS INDEX: 21.71 KG/M2 | DIASTOLIC BLOOD PRESSURE: 66 MMHG | SYSTOLIC BLOOD PRESSURE: 92 MMHG | HEIGHT: 61 IN

## 2021-07-26 DIAGNOSIS — N95.1 VAGINAL DRYNESS, MENOPAUSAL: ICD-10-CM

## 2021-07-26 DIAGNOSIS — Z01.419 ENCOUNTER FOR GYNECOLOGICAL EXAMINATION (GENERAL) (ROUTINE) WITHOUT ABNORMAL FINDINGS: Primary | ICD-10-CM

## 2021-07-26 DIAGNOSIS — Z11.51 SCREENING FOR HPV (HUMAN PAPILLOMAVIRUS): ICD-10-CM

## 2021-07-26 PROCEDURE — G0476 HPV COMBO ASSAY CA SCREEN: HCPCS | Performed by: NURSE PRACTITIONER

## 2021-07-26 PROCEDURE — G0145 SCR C/V CYTO,THINLAYER,RESCR: HCPCS | Performed by: NURSE PRACTITIONER

## 2021-07-26 PROCEDURE — S0612 ANNUAL GYNECOLOGICAL EXAMINA: HCPCS | Performed by: NURSE PRACTITIONER

## 2021-07-26 RX ORDER — ESTRADIOL 0.1 MG/G
1 CREAM VAGINAL 2 TIMES WEEKLY
Qty: 42.5 G | Refills: 1 | Status: SHIPPED | OUTPATIENT
Start: 2021-07-26 | End: 2022-03-14

## 2021-07-26 NOTE — PROGRESS NOTES
Encounter for gynecological examination (general) (routine) without abnormal findings    Benign findings on routine gyn exam  Recommended monthly SBE, annual CBE and annual screening mammo  ASCCP guidelines reviewed and pap with cotesting collected today; this low risk patient was advised she meets criteria to d/c pap screening at age 70  Colonoscopy noted to be up to date  The patient denies STI risk factors and declines testing at this time  Reviewed diet/activity recommendations:  Encouraged daily Ca++ and vitamin D intake as well as daily weight bearing exercise for promotion of bone health    Discussed postmenopausal considerations and symptoms to report  RTO in one year for routine annual gyn exam or sooner PRN  Vaginal dryness, menopausal  Much improved with estrogen cream   No longer has bleeding after intercourse  Diagnoses and all orders for this visit:    Encounter for gynecological examination (general) (routine) without abnormal findings  -     Liquid-based pap, screening    Screening for HPV (human papillomavirus)  -     Liquid-based pap, screening    Vaginal dryness, menopausal  -     estradiol (ESTRACE) 0 1 mg/g vaginal cream; Insert 1 g into the vagina 2 (two) times a week for 90 doses         Glenn Worthy   1965    CC:  Yearly exam    S:  Glenn Worthy is a 60 y o  female here for yearly exam  She is postmenopausal since age 44 and has had no vaginal bleeding  She denies abnormal vaginal discharge, itching, odor or dryness  She denies breast concerns, abdominal/pelvic pain or bladder/bowel dysfunction  Denies stress incontinence and hot flashes  Sexual activity: She is sexually active without pain, bleeding or dryness  Last year she was having bleeding after intercourse but this resolved since starting vaginal estogen    STD testing: She does not want STD testing today       Last Pap: 5/18 neg/neg   Last Mammo: 7/20 EMILEE 2  SBE: sometimes   Last Colonoscopy: 10/20 due 2030     [facility]       Family hx of breast cancer: denies  Family hx of ovarian cancer: denies  Family hx of colon cancer: denies       Current Outpatient Medications:     Ascorbic Acid (VITAMIN C PO), Take by mouth daily , Disp: , Rfl:     Bilberry, Vaccinium myrtillus, (BILBERRY EXTRACT PO), Take by mouth daily , Disp: , Rfl:     BIOTIN PO, Take by mouth daily , Disp: , Rfl:     Calcium Carbonate-Vitamin D (CALCIUM-D PO), Take by mouth daily , Disp: , Rfl:     COLLAGEN PO, Take by mouth daily , Disp: , Rfl:     Cyanocobalamin (VITAMIN B-12 PO), Take by mouth daily , Disp: , Rfl:     desmopressin (DDAVP) 0 2 mg tablet, TAKE 1 TABLET TWICE A DAY, Disp: 180 tablet, Rfl: 3    estradiol (ESTRACE) 0 1 mg/g vaginal cream, Insert 1 g into the vagina 2 (two) times a week for 90 doses, Disp: 42 5 g, Rfl: 1    GLUCOSAMINE-CHONDROITIN PO, Take by mouth daily , Disp: , Rfl:     Misc Natural Products (TART CHERRY ADVANCED PO), Take by mouth daily , Disp: , Rfl:     Misc Natural Products (Turmeric Curcumin) CAPS, Take by mouth daily , Disp: , Rfl:     Multiple Vitamin (DAILY VITAMIN) tablet, Take 1 tablet by mouth daily , Disp: , Rfl:     Multiple Vitamins-Minerals (HAIR VITAMINS PO), Take by mouth daily , Disp: , Rfl:     NON FORMULARY, luti-gold OTC daily/ viviscalpro OTC daily, Disp: , Rfl:     Omega-3 Fatty Acids (FISH OIL PO), Take by mouth daily , Disp: , Rfl:     PAPAYA PO, Take by mouth daily , Disp: , Rfl:     Pomegranate, Punica granatum, (POMEGRANATE PO), Take by mouth daily , Disp: , Rfl:     Probiotic Product (PROBIOTIC DAILY PO), Take by mouth daily , Disp: , Rfl:     Pyridoxine HCl (VITAMIN B6 PO), Take by mouth daily , Disp: , Rfl:     Red Yeast Rice Extract (RED YEAST RICE PO), Take by mouth daily , Disp: , Rfl:     triamcinolone (KENALOG) 0 1 % cream, Apply topically 2 (two) times a day, Disp: 30 g, Rfl: 1    VITAMIN D PO, Take by mouth daily , Disp: , Rfl:    MELATONIN PO, Take by mouth daily at bedtime as needed  (Patient not taking: Reported on 2021), Disp: , Rfl:   Social History     Socioeconomic History    Marital status: /Civil Union     Spouse name: Not on file    Number of children: 1    Years of education: Not on file    Highest education level: Not on file   Occupational History    Occupation: Teacher     Comment:    Tobacco Use    Smoking status: Never Smoker    Smokeless tobacco: Never Used   Vaping Use    Vaping Use: Never used   Substance and Sexual Activity    Alcohol use: No    Drug use: No    Sexual activity: Yes     Partners: Male     Birth control/protection: Post-menopausal     Comment:    Other Topics Concern    Not on file   Social History Narrative    · Most recent tobacco use screenin2019      · Do you currently or have you served in [employer]:   No      · Were you activated, into active duty, as a member of the [employer] or as a Reservist:   No      Social Determinants of Health     Financial Resource Strain:     Difficulty of Paying Living Expenses:    Food Insecurity:     Worried About [address] in the Last Year:    [address] [employer] in the Last Year:    Transportation Needs:     Lack of Transportation (Medical):      Lack of Transportation (Non-Medical):    Physical Activity:     Days of Exercise per Week:     Minutes of Exercise per Session:    Stress:     Feeling of Stress :    Social Connections:     Frequency of Communication with Friends and Family:     Frequency of Social Gatherings with Friends and Family:     Attends [employer] Services:     Active Member of Clubs or Organizations:     Attends Club or Organization Meetings:     Marital Status:    Intimate Partner Violence:     Fear of Current or Ex-Partner:     Emotionally Abused:     Physically Abused:     Sexually Abused:      Family History   Problem Relation Age of Onset    Hypertension Mother     Diabetes Father     No Known Problems Sister     No Known Problems Sister     No Known Problems Sister     No Known Problems Sister     Other Son         lyme disease     Past Medical History:   Diagnosis Date    Breast cyst     Cholelithiasis     Cystocele, midline     Frequent urination     H/O bilateral breast implants     Headache     Hyperlipidemia     Lyme disease     2016    Mild acid reflux     PONV (postoperative nausea and vomiting)     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     Spider veins of both lower extremities     Umbilical hernia     Umbilical hernia without obstruction and without gangrene 8/11/2020    Wears glasses         Review of Systems   Constitutional: Negative for appetite change, fatigue and unexpected weight change  Respiratory: Negative for shortness of breath  Cardiovascular: Negative for chest pain and leg swelling  Gastrointestinal: Negative for abdominal pain  Endocrine: Negative for cold intolerance and heat intolerance  Breasts:  Negative for breast tenderness or masses  Genitourinary: Negative for dyspareunia, dysuria, flank pain, frequency, genital sores, hematuria and pelvic pain  Negative for stress incontinence  Musculoskeletal: Negative for back pain  Neurological: Negative for headaches  O:  Blood pressure 92/66, height 5' 1" (1 549 m), weight 52 2 kg (115 lb), not currently breastfeeding  Patient appears well and is not in distress  Neck is supple without masses  Normal thyroid  Heart regular rate and rhythm  Lungs CTA bilaterally   Breasts are symmetrical without mass, tenderness, nipple discharge, skin changes or adenopathy  + scars from bilateral breast implants   Abdomen is soft and nontender without masses  External genitals are normal without lesions or rashes    Left labia minora larger than right (noted prior years)  Urethral meatus and urethra are normal  Bladder is normal to palpation  Vagina is normal without discharge or bleeding  Very mild atrophic changes   Cervix is normal without discharge or lesion  Uterus is normal, mobile, nontender without palpable mass  Adnexa are normal, nontender, without palpable mass     Skin warm and dry   Capillary refill < 2 seconds  Alert and oriented x 3 with normal affect

## 2021-07-28 ENCOUNTER — OFFICE VISIT (OUTPATIENT)
Dept: URGENT CARE | Facility: MEDICAL CENTER | Age: 56
End: 2021-07-28
Payer: COMMERCIAL

## 2021-07-28 VITALS
HEIGHT: 62 IN | WEIGHT: 114 LBS | HEART RATE: 60 BPM | BODY MASS INDEX: 20.98 KG/M2 | RESPIRATION RATE: 19 BRPM | TEMPERATURE: 96 F | OXYGEN SATURATION: 100 %

## 2021-07-28 DIAGNOSIS — R19.7 DIARRHEA, UNSPECIFIED TYPE: Primary | ICD-10-CM

## 2021-07-28 LAB
HPV HR 12 DNA CVX QL NAA+PROBE: NEGATIVE
HPV16 DNA CVX QL NAA+PROBE: NEGATIVE
HPV18 DNA CVX QL NAA+PROBE: NEGATIVE

## 2021-07-28 PROCEDURE — U0005 INFEC AGEN DETEC AMPLI PROBE: HCPCS | Performed by: PHYSICIAN ASSISTANT

## 2021-07-28 PROCEDURE — 99213 OFFICE O/P EST LOW 20 MIN: CPT | Performed by: PHYSICIAN ASSISTANT

## 2021-07-28 PROCEDURE — U0003 INFECTIOUS AGENT DETECTION BY NUCLEIC ACID (DNA OR RNA); SEVERE ACUTE RESPIRATORY SYNDROME CORONAVIRUS 2 (SARS-COV-2) (CORONAVIRUS DISEASE [COVID-19]), AMPLIFIED PROBE TECHNIQUE, MAKING USE OF HIGH THROUGHPUT TECHNOLOGIES AS DESCRIBED BY CMS-2020-01-R: HCPCS | Performed by: PHYSICIAN ASSISTANT

## 2021-07-28 RX ORDER — ONDANSETRON 4 MG/1
4 TABLET, ORALLY DISINTEGRATING ORAL EVERY 6 HOURS PRN
Status: SHIPPED | OUTPATIENT
Start: 2021-07-28

## 2021-07-28 RX ORDER — ONDANSETRON 4 MG/1
4 TABLET, FILM COATED ORAL EVERY 8 HOURS PRN
Qty: 20 TABLET | Refills: 0 | Status: SHIPPED | OUTPATIENT
Start: 2021-07-28 | End: 2021-12-21

## 2021-07-28 RX ADMIN — ONDANSETRON 4 MG: 4 TABLET, ORALLY DISINTEGRATING ORAL at 16:56

## 2021-07-28 NOTE — PROGRESS NOTES
[facility]        NAME: Glenn Worthy is a 60 y o  female  : 1965    MRN: [id number]  DATE: 2021  TIME: 4:53 PM    Assessment and Plan   Diarrhea, unspecified type [R19 7]  1  Diarrhea, unspecified type  Novel Coronavirus (Covid-19),[facility]    ondansetron (ZOFRAN-ODT) dispersible tablet 4 mg    ondansetron (ZOFRAN) 4 mg tablet         Patient Instructions     Diarrhea  zofran  covid test sent  Follow up with PCP in 3-5 days  Proceed to  ER if symptoms worsen  Chief Complaint     Chief Complaint   Patient presents with    Nausea     Started yesterday with nausea and vomiting    Generalized Body Aches    Fatigue    Headache         History of Present Illness       59 y/o female presents c/o nausea, vomiting (5 episodes today) and generalized fatigue  Patient states she gets like this sometimes when she over heats  Denies chest pain, SOB, diarrhea      Review of Systems   Review of Systems   Constitutional: Negative  HENT: Negative  Eyes: Negative  Respiratory: Negative  Negative for cough, chest tightness, shortness of breath, wheezing and stridor  Cardiovascular: Negative  Negative for chest pain, palpitations and leg swelling  Gastrointestinal: Positive for nausea and vomiting  Negative for abdominal distention, abdominal pain, anal bleeding, blood in stool, constipation, diarrhea and rectal pain           Current Medications       Current Outpatient Medications:     Ascorbic Acid (VITAMIN C PO), Take by mouth daily , Disp: , Rfl:     Bilberry, Vaccinium myrtillus, (BILBERRY EXTRACT PO), Take by mouth daily , Disp: , Rfl:     BIOTIN PO, Take by mouth daily , Disp: , Rfl:     Calcium Carbonate-Vitamin D (CALCIUM-D PO), Take by mouth daily , Disp: , Rfl:     COLLAGEN PO, Take by mouth daily , Disp: , Rfl:     Cyanocobalamin (VITAMIN B-12 PO), Take by mouth daily , Disp: , Rfl:     desmopressin (DDAVP) 0 2 mg tablet, TAKE 1 TABLET TWICE A DAY, Disp: 180 tablet, Rfl: 3    estradiol (ESTRACE) 0 1 mg/g vaginal cream, Insert 1 g into the vagina 2 (two) times a week for 90 doses, Disp: 42 5 g, Rfl: 1    GLUCOSAMINE-CHONDROITIN PO, Take by mouth daily , Disp: , Rfl:     Misc Natural Products (TART CHERRY ADVANCED PO), Take by mouth daily , Disp: , Rfl:     Misc Natural Products (Turmeric Curcumin) CAPS, Take by mouth daily , Disp: , Rfl:     Multiple Vitamin (DAILY VITAMIN) tablet, Take 1 tablet by mouth daily , Disp: , Rfl:     Multiple Vitamins-Minerals (HAIR VITAMINS PO), Take by mouth daily , Disp: , Rfl:     NON FORMULARY, luti-gold OTC daily/ viviscalpro OTC daily, Disp: , Rfl:     Omega-3 Fatty Acids (FISH OIL PO), Take by mouth daily , Disp: , Rfl:     PAPAYA PO, Take by mouth daily , Disp: , Rfl:     Pomegranate, Punica granatum, (POMEGRANATE PO), Take by mouth daily , Disp: , Rfl:     Probiotic Product (PROBIOTIC DAILY PO), Take by mouth daily , Disp: , Rfl:     Pyridoxine HCl (VITAMIN B6 PO), Take by mouth daily , Disp: , Rfl:     Red Yeast Rice Extract (RED YEAST RICE PO), Take by mouth daily , Disp: , Rfl:     triamcinolone (KENALOG) 0 1 % cream, Apply topically 2 (two) times a day, Disp: 30 g, Rfl: 1    MELATONIN PO, Take by mouth daily at bedtime as needed  (Patient not taking: Reported on 7/26/2021), Disp: , Rfl:     ondansetron (ZOFRAN) 4 mg tablet, Take 1 tablet (4 mg total) by mouth every 8 (eight) hours as needed for nausea or vomiting, Disp: 20 tablet, Rfl: 0    VITAMIN D PO, Take by mouth daily , Disp: , Rfl:     Current Facility-Administered Medications:     ondansetron (ZOFRAN-ODT) dispersible tablet 4 mg, 4 mg, Oral, Q6H PRN, Jonnie Morel PA-C    Current Allergies     Allergies as of 07/28/2021    (No Known Allergies)            The following portions of the patient's history were reviewed and updated as appropriate: allergies, current medications, past family history, past medical history, past social history, past surgical history and problem list      Past Medical History:   Diagnosis Date    Breast cyst     Cholelithiasis     Cystocele, midline     Frequent urination     H/O bilateral breast implants     Headache     Hyperlipidemia     Lyme disease     2016    Mild acid reflux     PONV (postoperative nausea and vomiting)     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     Spider veins of both lower extremities     Umbilical hernia     Umbilical hernia without obstruction and without gangrene 8/11/2020    Wears glasses        Past Surgical History:   Procedure Laterality Date    AUGMENTATION MAMMAPLASTY Bilateral 2014    retro pec saline    COLONOSCOPY  2014    EGD      WY CMBND ANTERPOST COLPORRAPHY W/CYSTO N/A 12/22/2020    Procedure: ANT POSTCOLPORRHAPHY;  Surgeon: Veronica Booker MD;  [facility];  Service: UroGynecology           WY CYSTOURETHROSCOPY N/A 12/22/2020    Procedure: Adiel Cordon;  Surgeon: Veronica Booker MD;  [facility];  Service: UroGynecology           WY LAP,CHOLECYSTECTOMY N/A 12/22/2020    Procedure: LAP CARLOTTA;  Surgeon: Joan Sifuentes MD;  [facility];  Service: General    WY REPAIR UMBILICAL DMZI,7+O/Z,MUNBI N/A 12/22/2020    Procedure: REPAIR HERNIA UMBILICAL;  Surgeon: Joan Sifuentes MD;  [facility];  Service: General    WY SLING OPER STRES INCONTINENCE N/A 12/22/2020    Procedure: PUBOVAGINAL SLING;  Surgeon: Veronica Booker MD;  [facility];  Service: UroGynecology              Family History   Problem Relation Age of Onset    Hypertension Mother     Diabetes Father     No Known Problems Sister     No Known Problems Sister     No Known Problems Sister     No Known Problems Sister     Other Son         lyme disease         Medications have been verified          Objective   Pulse 60   Temp (!) 96 °F (35 6 °C) (Tympanic)   Resp 19   Ht 5' 2" (1 575 m)   Wt 51 7 kg (114 lb) SpO2 100%   BMI 20 85 kg/m²        Physical Exam     Physical Exam  Constitutional:       Appearance: She is well-developed  HENT:      Head: Normocephalic and atraumatic  Right Ear: External ear normal       Left Ear: External ear normal       Nose: Nose normal       Mouth/Throat:      Pharynx: No oropharyngeal exudate  Cardiovascular:      Rate and Rhythm: Normal rate and regular rhythm  Heart sounds: Normal heart sounds  Pulmonary:      Effort: Pulmonary effort is normal  No respiratory distress  Breath sounds: Normal breath sounds  No wheezing or rales  Chest:      Chest wall: No tenderness  Abdominal:      General: Bowel sounds are normal  There is no distension  Palpations: Abdomen is soft  There is no mass  Tenderness: There is no abdominal tenderness  There is no guarding or rebound  Musculoskeletal:      Cervical back: Normal range of motion and neck supple  Lymphadenopathy:      Cervical: No cervical adenopathy

## 2021-07-28 NOTE — PATIENT INSTRUCTIONS
Diarrhea  zofran as directedNutrition Tips for Relief of Diarrhea   WHAT YOU NEED TO KNOW:   There are diet changes you can make to help relieve or stop diarrhea  These changes include limiting or avoiding foods and liquids that are high in sugar, fat, fiber, and lactose  Lactose is a sugar found in milk products  Milk products can cause diarrhea in people who are lactose intolerant  You should also drink extra liquids to replace fluids that are lost when you have diarrhea  Diarrhea can lead to dehydration  DISCHARGE INSTRUCTIONS:   Foods to limit or avoid:   · Dairy:      ? Whole milk    ? Half-and-half, cream, and sour cream    ? Regular (whole milk) ice cream    · Grains:      ? Whole wheat and whole grain breads, pasta, cereals, and crackers    ? Eyal Amando and wild rice    ? Breads and cereals with seeds or nuts    ? Popcorn    · Fruit and vegetables:      ? All raw fruits, except bananas and melon    ? Dried fruits, including prunes and raisins    ? Canned fruit in heavy syrup    ? Prune juice and any fruit juice with pulp    ? Raw vegetables, except lettuce     ? Fried vegetables    ? Corn, raw and cooked broccoli, cabbage, cauliflower, and kierra greens    · Protein:      ? Fried meat, poultry, and fish    ? High-fat luncheon meats, such as bologna    ? Fatty meats, such as sausage, shearer, and hot dogs    ? Beans and nuts    · Liquids:      ? Sodas and fruit-flavored drinks    ? Drinks that contain caffeine, such as energy drinks, coffee, and tea     ? Drinks that contain alcohol or sugar alcohol, such as sorbitol    Foods and liquids you may eat or drink:  Most people can tolerate the foods and liquids listed below  If any of them make your symptoms worse, stop eating or drinking them until you feel better  If you are lactose intolerant, avoid milk products  · Dairy:      ? Skim or low-fat milk or evaporated milk    ? Soy milk or buttermilk     ? Low-fat, part-skim, and aged cheese    ?  Yogurt, low-fat ice cream, or sherbert    · Grains:  (Choose foods with less than 2 grams of dietary fiber per serving )     ? White or refined flour breads, bagels, pasta, and crackers    ? Cold or hot cereals made from white or refined flour such as puffed rice, cornflakes, or cream of wheat    ? White rice    · Fruit and vegetables:      ? Bananas or melon    ? Fruit juice without pulp, except prune juice    ? Canned fruit in juice or light syrup    ? Lettuce and most well-cooked vegetables without seeds or skins     ? Strained vegetable juice    · Protein:      ? Tender, well-cooked meat, poultry, or fish    ? Well-cooked eggs or soy foods (cooked without added fat)    ? Smooth nut butters    · Fats:  (Limit fats to less than 8 teaspoons a day)     ? Oil, butter, or margarine, or mayonnaise    ? Cream cheese or salad dressings    · Liquids:      ? For infants, breast milk or formula    ? Oral rehydration solution     ? Decaffeinated coffee or caffeine-free teas    ? Soft drinks without caffeine    Other guidelines to follow:   · Drink liquids as directed  You may need to drink more liquids than usual to prevent dehydration  Ask how much liquid to drink each day and which liquids are best for you  You may need to drink an oral rehydration solution (ORS)  An ORS helps replace fluids and electrolytes that you lose when you have diarrhea  · Eat small meals or snacks every 3 to 4 hours  instead of large meals  Continue eating even if you still have diarrhea  Your diarrhea will continue for a few days but should gradually go away  © Copyright [employer] 2021 Information is for End User's use only and may not be sold, redistributed or otherwise used for commercial purposes  All illustrations and images included in CareNotes® are the copyrighted property of [employer]  or Carolyne Matt   The above information is an  only  It is not intended as medical advice for individual conditions or treatments   Talk to your doctor, nurse or pharmacist before following any medical regimen to see if it is safe and effective for you     covid test sent  Follow up with PCP in 3-5 days  Proceed to  ER if symptoms worsen

## 2021-07-29 ENCOUNTER — TELEPHONE (OUTPATIENT)
Dept: FAMILY MEDICINE CLINIC | Facility: CLINIC | Age: 56
End: 2021-07-29

## 2021-07-29 LAB — SARS-COV-2 RNA RESP QL NAA+PROBE: NEGATIVE

## 2021-07-29 NOTE — TELEPHONE ENCOUNTER
Patient called that she was seen by urgent care yesterday (7/28/21) because she could not get in here for an appointment because we had no availability  She was hoping to get an appointment here today for an antibiotic  I was looking at the note from urgent care and asked the patient to clarify why she was seen  She said she went there for throwing up and not diarrhea and said that the medicine they gave her was not working  I also saw that she was tested for covid but the results were not back yet  I told patient I would talk to the provider when they came in to get some direction in what she should do and call her back  When Michael Alves came in I spoke with her regarding everything  She said she cannot do anything or see patient if her covid test was still outstanding  She also suggested the patient go to the emergency room to be seen because she could be dehydrated  I called the patient and relayed the message  She said she did not want to go to the emergency room because she was drinking water and that she would just call tomorrow to see if she could be seen when her covid test results come in

## 2021-07-30 LAB
LAB AP GYN PRIMARY INTERPRETATION: NORMAL
Lab: NORMAL

## 2021-08-03 ENCOUNTER — HOSPITAL ENCOUNTER (OUTPATIENT)
Dept: RADIOLOGY | Age: 56
Discharge: HOME | End: 2021-08-03
Payer: COMMERCIAL

## 2021-08-03 VITALS — BODY MASS INDEX: 20.98 KG/M2 | HEIGHT: 62 IN | WEIGHT: 114 LBS

## 2021-08-03 DIAGNOSIS — Z12.31 ENCOUNTER FOR SCREENING MAMMOGRAM FOR MALIGNANT NEOPLASM OF BREAST: ICD-10-CM

## 2021-08-03 DIAGNOSIS — Z12.31 SCREENING MAMMOGRAM FOR HIGH-RISK PATIENT: ICD-10-CM

## 2021-08-03 PROCEDURE — 77067 SCR MAMMO BI INCL CAD: CPT

## 2021-08-03 PROCEDURE — 77063 BREAST TOMOSYNTHESIS BI: CPT

## 2021-08-04 ENCOUNTER — TELEPHONE (OUTPATIENT)
Dept: OBGYN CLINIC | Facility: CLINIC | Age: 56
End: 2021-08-04

## 2021-08-13 ENCOUNTER — OFFICE VISIT (OUTPATIENT)
Dept: FAMILY MEDICINE CLINIC | Facility: CLINIC | Age: 56
End: 2021-08-13
Payer: COMMERCIAL

## 2021-08-13 ENCOUNTER — APPOINTMENT (OUTPATIENT)
Dept: LAB | Facility: MEDICAL CENTER | Age: 56
End: 2021-08-13
Payer: COMMERCIAL

## 2021-08-13 VITALS
OXYGEN SATURATION: 98 % | DIASTOLIC BLOOD PRESSURE: 66 MMHG | WEIGHT: 111 LBS | HEART RATE: 61 BPM | BODY MASS INDEX: 20.43 KG/M2 | HEIGHT: 62 IN | TEMPERATURE: 99.1 F | SYSTOLIC BLOOD PRESSURE: 108 MMHG

## 2021-08-13 DIAGNOSIS — R11.0 NAUSEA: ICD-10-CM

## 2021-08-13 DIAGNOSIS — N39.44 BED WETTING: Primary | ICD-10-CM

## 2021-08-13 LAB
ALBUMIN SERPL BCP-MCNC: 3.5 G/DL (ref 3.5–5)
ALP SERPL-CCNC: 85 U/L (ref 46–116)
ALT SERPL W P-5'-P-CCNC: 29 U/L (ref 12–78)
ANION GAP SERPL CALCULATED.3IONS-SCNC: 3 MMOL/L (ref 4–13)
AST SERPL W P-5'-P-CCNC: 22 U/L (ref 5–45)
BACTERIA UR QL AUTO: ABNORMAL /HPF
BASOPHILS # BLD AUTO: 0.02 THOUSANDS/ΜL (ref 0–0.1)
BASOPHILS NFR BLD AUTO: 1 % (ref 0–1)
BILIRUB SERPL-MCNC: 1.17 MG/DL (ref 0.2–1)
BILIRUB UR QL STRIP: NEGATIVE
BUN SERPL-MCNC: 15 MG/DL (ref 5–25)
CALCIUM SERPL-MCNC: 8.5 MG/DL (ref 8.3–10.1)
CAOX CRY URNS QL MICRO: ABNORMAL /HPF
CHLORIDE SERPL-SCNC: 103 MMOL/L (ref 100–108)
CLARITY UR: ABNORMAL
CO2 SERPL-SCNC: 27 MMOL/L (ref 21–32)
COLOR UR: YELLOW
CREAT SERPL-MCNC: 0.56 MG/DL (ref 0.6–1.3)
EOSINOPHIL # BLD AUTO: 0.09 THOUSAND/ΜL (ref 0–0.61)
EOSINOPHIL NFR BLD AUTO: 3 % (ref 0–6)
ERYTHROCYTE [DISTWIDTH] IN BLOOD BY AUTOMATED COUNT: 12.9 % (ref 11.6–15.1)
GFR SERPL CREATININE-BSD FRML MDRD: 104 ML/MIN/1.73SQ M
GLUCOSE P FAST SERPL-MCNC: 81 MG/DL (ref 65–99)
GLUCOSE UR STRIP-MCNC: NEGATIVE MG/DL
HCT VFR BLD AUTO: 36.9 % (ref 34.8–46.1)
HGB BLD-MCNC: 12.8 G/DL (ref 11.5–15.4)
HGB UR QL STRIP.AUTO: NEGATIVE
IMM GRANULOCYTES # BLD AUTO: 0 THOUSAND/UL (ref 0–0.2)
IMM GRANULOCYTES NFR BLD AUTO: 0 % (ref 0–2)
KETONES UR STRIP-MCNC: NEGATIVE MG/DL
LEUKOCYTE ESTERASE UR QL STRIP: NEGATIVE
LYMPHOCYTES # BLD AUTO: 1.24 THOUSANDS/ΜL (ref 0.6–4.47)
LYMPHOCYTES NFR BLD AUTO: 41 % (ref 14–44)
MAGNESIUM SERPL-MCNC: 2.2 MG/DL (ref 1.6–2.6)
MCH RBC QN AUTO: 32.9 PG (ref 26.8–34.3)
MCHC RBC AUTO-ENTMCNC: 34.7 G/DL (ref 31.4–37.4)
MCV RBC AUTO: 95 FL (ref 82–98)
MONOCYTES # BLD AUTO: 0.32 THOUSAND/ΜL (ref 0.17–1.22)
MONOCYTES NFR BLD AUTO: 11 % (ref 4–12)
MUCOUS THREADS UR QL AUTO: ABNORMAL
NEUTROPHILS # BLD AUTO: 1.39 THOUSANDS/ΜL (ref 1.85–7.62)
NEUTS SEG NFR BLD AUTO: 44 % (ref 43–75)
NITRITE UR QL STRIP: NEGATIVE
NON-SQ EPI CELLS URNS QL MICRO: ABNORMAL /HPF
NRBC BLD AUTO-RTO: 0 /100 WBCS
PH UR STRIP.AUTO: 6.5 [PH]
PLATELET # BLD AUTO: 251 THOUSANDS/UL (ref 149–390)
PMV BLD AUTO: 10.2 FL (ref 8.9–12.7)
POTASSIUM SERPL-SCNC: 3.8 MMOL/L (ref 3.5–5.3)
PROT SERPL-MCNC: 6.6 G/DL (ref 6.4–8.2)
PROT UR STRIP-MCNC: NEGATIVE MG/DL
RBC # BLD AUTO: 3.89 MILLION/UL (ref 3.81–5.12)
RBC #/AREA URNS AUTO: ABNORMAL /HPF
SODIUM SERPL-SCNC: 133 MMOL/L (ref 136–145)
SP GR UR STRIP.AUTO: 1.02 (ref 1–1.03)
UROBILINOGEN UR QL STRIP.AUTO: 0.2 E.U./DL
WBC # BLD AUTO: 3.06 THOUSAND/UL (ref 4.31–10.16)
WBC #/AREA URNS AUTO: ABNORMAL /HPF

## 2021-08-13 PROCEDURE — 83735 ASSAY OF MAGNESIUM: CPT

## 2021-08-13 PROCEDURE — 87491 CHLMYD TRACH DNA AMP PROBE: CPT

## 2021-08-13 PROCEDURE — 99213 OFFICE O/P EST LOW 20 MIN: CPT | Performed by: FAMILY MEDICINE

## 2021-08-13 PROCEDURE — 1036F TOBACCO NON-USER: CPT | Performed by: FAMILY MEDICINE

## 2021-08-13 PROCEDURE — 85025 COMPLETE CBC W/AUTO DIFF WBC: CPT

## 2021-08-13 PROCEDURE — 87591 N.GONORRHOEAE DNA AMP PROB: CPT

## 2021-08-13 PROCEDURE — 81001 URINALYSIS AUTO W/SCOPE: CPT | Performed by: FAMILY MEDICINE

## 2021-08-13 PROCEDURE — 80053 COMPREHEN METABOLIC PANEL: CPT

## 2021-08-13 PROCEDURE — 36415 COLL VENOUS BLD VENIPUNCTURE: CPT

## 2021-08-13 PROCEDURE — 3008F BODY MASS INDEX DOCD: CPT | Performed by: FAMILY MEDICINE

## 2021-08-13 NOTE — PROGRESS NOTES
Assessment/Plan:         Problem List Items Addressed This Visit        Other    Bed wetting - Primary     Patient is stable  and will continue present plan of care and reassess at next routine visit  All questions about this problem from patient were answered today  Subjective:      Patient ID: Mary Beth Amaya is a 60 y o  female  47-year-old female here today for checkup for episode of nausea and vomiting  About 2 weeks ago she had nausea vomiting for 48 hours she nothing down she had gone to the [facility] and was given some Zofran and some lab work which not sure she any COVID  Patient did better after that although yesterday after being outside in the heat she had some more nausea and she is here today for evaluation  Patient does not this is a been involved with any food she ate she has had no fevers and she said she is feeling S she is seeing better today  She has not had any diarrhea or abdominal cramping  The following portions of the patient's history were reviewed and updated as appropriate:   Past Medical History:  She has a past medical history of Breast cyst, Cholelithiasis, Cystocele, midline, Frequent urination, H/O bilateral breast implants, Headache, Hyperlipidemia, Lyme disease, Mild acid reflux, PONV (postoperative nausea and vomiting), 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, Spider veins of both lower extremities, Umbilical hernia, Umbilical hernia without obstruction and without gangrene (8/11/2020), and Wears glasses  ,  _______________________________________________________________________  Medical Problems:  does not have any pertinent problems on file ,  _______________________________________________________________________  Past Surgical History:   has a past surgical history that includes Colonoscopy (2014); EGD; pr cmbnd anterpost colporraphy w/cysto (N/A, 12/22/2020); pr sling oper stres incontinence (N/A, 12/22/2020); pr cystourethroscopy (N/A, 12/22/2020); pr repair umbilical CBSW,3+I/P,MZDPN (N/A, 12/22/2020); pr lap,cholecystectomy (N/A, 12/22/2020); and Augmentation mammaplasty (Bilateral, 2014)  ,  _______________________________________________________________________  Family History:  family history includes Diabetes in her father; Hypertension in her mother; No Known Problems in her sister, sister, sister, and sister; Other in her son ,  _______________________________________________________________________  Social History:   reports that she has never smoked  She has never used smokeless tobacco  She reports that she does not drink alcohol and does not use drugs  ,  _______________________________________________________________________  Allergies:  has No Known Allergies     _______________________________________________________________________  Current Outpatient Medications   Medication Sig Dispense Refill    Ascorbic Acid (VITAMIN C PO) Take by mouth daily       Bilberry, Vaccinium myrtillus, (BILBERRY EXTRACT PO) Take by mouth daily       BIOTIN PO Take by mouth daily       Calcium Carbonate-Vitamin D (CALCIUM-D PO) Take by mouth daily       COLLAGEN PO Take by mouth daily       Cyanocobalamin (VITAMIN B-12 PO) Take by mouth daily       desmopressin (DDAVP) 0 2 mg tablet TAKE 1 TABLET TWICE A  tablet 3    estradiol (ESTRACE) 0 1 mg/g vaginal cream Insert 1 g into the vagina 2 (two) times a week for 90 doses 42 5 g 1    GLUCOSAMINE-CHONDROITIN PO Take by mouth daily       MELATONIN PO Take by mouth daily at bedtime as needed  (Patient not taking: Reported on 7/26/2021)      Misc Natural Products (TART CHERRY ADVANCED PO) Take by mouth daily       Misc Natural Products (Turmeric Curcumin) CAPS Take by mouth daily       Multiple Vitamin (DAILY VITAMIN) tablet Take 1 tablet by mouth daily       Multiple Vitamins-Minerals (HAIR VITAMINS PO) Take by mouth daily       NON FORMULARY luti-gold OTC daily/ viviscalpro OTC daily      Omega-3 Fatty Acids (FISH OIL PO) Take by mouth daily       ondansetron (ZOFRAN) 4 mg tablet Take 1 tablet (4 mg total) by mouth every 8 (eight) hours as needed for nausea or vomiting 20 tablet 0    PAPAYA PO Take by mouth daily       Pomegranate, Punica granatum, (POMEGRANATE PO) Take by mouth daily       Probiotic Product (PROBIOTIC DAILY PO) Take by mouth daily       Pyridoxine HCl (VITAMIN B6 PO) Take by mouth daily       Red Yeast Rice Extract (RED YEAST RICE PO) Take by mouth daily       triamcinolone (KENALOG) 0 1 % cream Apply topically 2 (two) times a day 30 g 1    VITAMIN D PO Take by mouth daily        Current Facility-Administered Medications   Medication Dose Route Frequency Provider Last Rate Last Admin    ondansetron (ZOFRAN-ODT) dispersible tablet 4 mg  4 mg Oral Q6H PRN Jonnie Morel PA-C   4 mg at 07/28/21 1656     _______________________________________________________________________  Review of Systems   Constitutional: Negative for activity change, appetite change, fatigue and fever  HENT: Negative for congestion, ear pain, postnasal drip, rhinorrhea, sinus pressure, sinus pain, sneezing and sore throat  Eyes: Negative for pain and redness  Respiratory: Negative for apnea, cough, chest tightness, shortness of breath and wheezing  Cardiovascular: Negative for chest pain, palpitations and leg swelling  Gastrointestinal: Negative for abdominal pain, constipation, diarrhea, nausea and vomiting  Endocrine: Negative for cold intolerance and heat intolerance  Genitourinary: Negative for difficulty urinating, dysuria, frequency, hematuria and urgency  Musculoskeletal: Negative for arthralgias, back pain, gait problem and myalgias  Skin: Negative for rash  Neurological: Negative for dizziness, speech difficulty, weakness, numbness and headaches  Hematological: Does not bruise/bleed easily     Psychiatric/Behavioral: Negative for agitation, confusion and hallucinations  Objective: There were no vitals filed for this visit  There is no height or weight on file to calculate BMI  Physical Exam  Vitals and nursing note reviewed  Constitutional:       Appearance: She is well-developed  HENT:      Head: Normocephalic and atraumatic  Nose: Nose normal       Mouth/Throat:      Mouth: Mucous membranes are moist    Eyes:      General: No scleral icterus  Conjunctiva/sclera: Conjunctivae normal       Pupils: Pupils are equal, round, and reactive to light  Neck:      Thyroid: No thyromegaly  Cardiovascular:      Rate and Rhythm: Normal rate and regular rhythm  Pulmonary:      Effort: Pulmonary effort is normal       Breath sounds: Normal breath sounds  No wheezing  Abdominal:      General: Bowel sounds are normal  There is no distension  Palpations: Abdomen is soft  Tenderness: There is no abdominal tenderness  There is no guarding or rebound  Musculoskeletal:         General: No tenderness or deformity  Normal range of motion  Cervical back: Normal range of motion and neck supple  Skin:     General: Skin is warm and dry  Findings: No erythema or rash  Neurological:      Mental Status: She is alert and oriented to person, place, and time  Sensory: No sensory deficit  Psychiatric:         Behavior: Behavior normal          Thought Content:  Thought content normal          Judgment: Judgment normal

## 2021-08-14 LAB
C TRACH DNA SPEC QL NAA+PROBE: NEGATIVE
N GONORRHOEA DNA SPEC QL NAA+PROBE: NEGATIVE

## 2021-08-16 ENCOUNTER — TELEPHONE (OUTPATIENT)
Dept: FAMILY MEDICINE CLINIC | Facility: CLINIC | Age: 56
End: 2021-08-16

## 2021-09-09 NOTE — PROGRESS NOTES
Assessment/Plan:         Problem List Items Addressed This Visit        Other    Bed wetting - Primary     Patient is stable  and will continue present plan of care and reassess at next routine visit  All questions about this problem from patient were answered today  Other Visit Diagnoses     Need for hepatitis C screening test        Screening for HIV (human immunodeficiency virus)                Subjective:      Patient ID: Feng Hernandez is a 60 y o  female  67-year-old female here today for checkup on nausea  Patient has had this for about the last few weeks she is here to get recheck to although now she says her nausea is much better she is not sure why she was nauseated she had no fever she has no abdominal pain or reflux  Patient also has no gallbladder with an removed ready  Patient with no new constipation and has no diarrhea  Patient is taking no other medicines other DDAVP and the last lab work she had had a sodium of 133 which is marginally low I will recheck that for her to see what the status is patient all concerned about possible Lyme disease from tick bites will order Lyme test on her also  Patient also relates she has been taking more for DDAVP that she was prescribed for past she was taking it twice a day now she is taking it 3 times a day issues having more problems with the increased urination  Patient has been on this medicine for many years was taking his before she was a patient of mine  Will have her see Urology and have her just take the medicine twice a day see what else we can do for her bladder problem she may need some urodynamic studies done to see assess what the status of her bladder is  Will see about giving her some did Triptan XL 10 mg 1 today and plan place the DDAVP to see if that will be effective in controlling her bladder issues to try get away from the hyponatremia from the DDAVP  Patient really should see Urology for bladder dynamic studies        The following portions of the patient's history were reviewed and updated as appropriate:   Past Medical History:  She has a past medical history of Breast cyst, Cholelithiasis, Cystocele, midline, Frequent urination, H/O bilateral breast implants, Headache, Hyperlipidemia, Lyme disease, Mild acid reflux, PONV (postoperative nausea and vomiting), 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, Spider veins of both lower extremities, Umbilical hernia, Umbilical hernia without obstruction and without gangrene (8/11/2020), and Wears glasses  ,  _______________________________________________________________________  Medical Problems:  does not have any pertinent problems on file ,  _______________________________________________________________________  Past Surgical History:   has a past surgical history that includes Colonoscopy (2014); EGD; pr cmbnd anterpost colporraphy w/cysto (N/A, 12/22/2020); pr sling oper stres incontinence (N/A, 12/22/2020); pr cystourethroscopy (N/A, 12/22/2020); pr repair umbilical SJCN,8+A/Y,AFOFA (N/A, 12/22/2020); pr lap,cholecystectomy (N/A, 12/22/2020); and Augmentation mammaplasty (Bilateral, 2014)  ,  _______________________________________________________________________  Family History:  family history includes Diabetes in her father; Hypertension in her mother; No Known Problems in her sister, sister, sister, and sister; Other in her son ,  _______________________________________________________________________  Social History:   reports that she has never smoked  She has never used smokeless tobacco  She reports that she does not drink alcohol and does not use drugs  ,  _______________________________________________________________________  Allergies:  has No Known Allergies     _______________________________________________________________________  Current Outpatient Medications   Medication Sig Dispense Refill    Ascorbic Acid (VITAMIN C PO) Take by mouth daily  Bilberry, Vaccinium myrtillus, (BILBERRY EXTRACT PO) Take by mouth daily       BIOTIN PO Take by mouth daily       Calcium Carbonate-Vitamin D (CALCIUM-D PO) Take by mouth daily       COLLAGEN PO Take by mouth daily       Cyanocobalamin (VITAMIN B-12 PO) Take by mouth daily       desmopressin (DDAVP) 0 2 mg tablet TAKE 1 TABLET TWICE A  tablet 3    estradiol (ESTRACE) 0 1 mg/g vaginal cream Insert 1 g into the vagina 2 (two) times a week for 90 doses 42 5 g 1    GLUCOSAMINE-CHONDROITIN PO Take by mouth daily       MELATONIN PO Take by mouth daily at bedtime as needed  (Patient not taking: Reported on 7/26/2021)      Misc Natural Products (TART CHERRY ADVANCED PO) Take by mouth daily       Misc Natural Products (Turmeric Curcumin) CAPS Take by mouth daily       Multiple Vitamin (DAILY VITAMIN) tablet Take 1 tablet by mouth daily       Multiple Vitamins-Minerals (HAIR VITAMINS PO) Take by mouth daily       NON FORMULARY luti-gold OTC daily/ viviscalpro OTC daily      Omega-3 Fatty Acids (FISH OIL PO) Take by mouth daily       ondansetron (ZOFRAN) 4 mg tablet Take 1 tablet (4 mg total) by mouth every 8 (eight) hours as needed for nausea or vomiting (Patient not taking: Reported on 8/13/2021) 20 tablet 0    PAPAYA PO Take by mouth daily       Pomegranate, Punica granatum, (POMEGRANATE PO) Take by mouth daily       Probiotic Product (PROBIOTIC DAILY PO) Take by mouth daily       Pyridoxine HCl (VITAMIN B6 PO) Take by mouth daily       Red Yeast Rice Extract (RED YEAST RICE PO) Take by mouth daily       triamcinolone (KENALOG) 0 1 % cream Apply topically 2 (two) times a day (Patient not taking: Reported on 8/13/2021) 30 g 1    VITAMIN D PO Take by mouth daily        Current Facility-Administered Medications   Medication Dose Route Frequency Provider Last Rate Last Admin    ondansetron (ZOFRAN-ODT) dispersible tablet 4 mg  4 mg Oral Q6H PRN Jonnie Morel PA-C   4 mg at 07/28/21 6391 _______________________________________________________________________  Review of Systems   Constitutional: Negative for activity change, appetite change, fatigue and fever  HENT: Negative for congestion, ear pain, postnasal drip, rhinorrhea, sinus pressure, sinus pain, sneezing and sore throat  Eyes: Negative for pain and redness  Respiratory: Negative for apnea, cough, chest tightness, shortness of breath and wheezing  Cardiovascular: Negative for chest pain, palpitations and leg swelling  Gastrointestinal: Negative for abdominal pain, constipation, diarrhea, nausea and vomiting  Endocrine: Negative for cold intolerance and heat intolerance  Genitourinary: Negative for difficulty urinating, dysuria, frequency, hematuria and urgency  Musculoskeletal: Negative for arthralgias, back pain, gait problem and myalgias  Skin: Negative for rash  Neurological: Negative for dizziness, speech difficulty, weakness, numbness and headaches  Hematological: Does not bruise/bleed easily  Psychiatric/Behavioral: Negative for agitation, confusion and hallucinations  Objective: There were no vitals filed for this visit  There is no height or weight on file to calculate BMI       Physical Exam

## 2021-09-10 ENCOUNTER — OFFICE VISIT (OUTPATIENT)
Dept: FAMILY MEDICINE CLINIC | Facility: CLINIC | Age: 56
End: 2021-09-10
Payer: COMMERCIAL

## 2021-09-10 VITALS
RESPIRATION RATE: 16 BRPM | HEIGHT: 62 IN | BODY MASS INDEX: 20.61 KG/M2 | SYSTOLIC BLOOD PRESSURE: 108 MMHG | DIASTOLIC BLOOD PRESSURE: 62 MMHG | HEART RATE: 70 BPM | OXYGEN SATURATION: 98 % | WEIGHT: 112 LBS | TEMPERATURE: 98.4 F

## 2021-09-10 DIAGNOSIS — N39.44 BED WETTING: Primary | ICD-10-CM

## 2021-09-10 DIAGNOSIS — Z11.59 NEED FOR HEPATITIS C SCREENING TEST: ICD-10-CM

## 2021-09-10 DIAGNOSIS — N32.81 OAB (OVERACTIVE BLADDER): ICD-10-CM

## 2021-09-10 DIAGNOSIS — R11.0 NAUSEA: ICD-10-CM

## 2021-09-10 DIAGNOSIS — Z11.4 SCREENING FOR HIV (HUMAN IMMUNODEFICIENCY VIRUS): ICD-10-CM
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
## 2021-09-10 PROCEDURE — 3008F BODY MASS INDEX DOCD: CPT | Performed by: FAMILY MEDICINE

## 2021-09-10 PROCEDURE — 1036F TOBACCO NON-USER: CPT | Performed by: FAMILY MEDICINE

## 2021-09-10 PROCEDURE — 99213 OFFICE O/P EST LOW 20 MIN: CPT | Performed by: FAMILY MEDICINE

## 2021-09-10 RX ORDER — OXYBUTYNIN CHLORIDE 15 MG/1
15 TABLET, EXTENDED RELEASE ORAL
Qty: 30 TABLET | Refills: 1 | Status: SHIPPED | OUTPATIENT
Start: 2021-09-10 | End: 2022-02-02

## 2021-09-18 ENCOUNTER — APPOINTMENT (OUTPATIENT)
Dept: LAB | Facility: MEDICAL CENTER | Age: 56
End: 2021-09-18
Payer: COMMERCIAL

## 2021-09-18 DIAGNOSIS — Z11.4 SCREENING FOR HIV (HUMAN IMMUNODEFICIENCY VIRUS): ICD-10-CM
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
## 2021-09-18 DIAGNOSIS — R53.83 OTHER FATIGUE: ICD-10-CM

## 2021-09-18 DIAGNOSIS — R11.0 NAUSEA: ICD-10-CM

## 2021-09-18 DIAGNOSIS — M25.50 ARTHRALGIA, UNSPECIFIED JOINT: ICD-10-CM

## 2021-09-18 DIAGNOSIS — Z13.220 SCREENING CHOLESTEROL LEVEL: ICD-10-CM

## 2021-09-18 DIAGNOSIS — Z11.59 NEED FOR HEPATITIS C SCREENING TEST: ICD-10-CM

## 2021-09-18 LAB
ALBUMIN SERPL BCP-MCNC: 3.6 G/DL (ref 3.5–5)
ALP SERPL-CCNC: 97 U/L (ref 46–116)
ALT SERPL W P-5'-P-CCNC: 34 U/L (ref 12–78)
ANION GAP SERPL CALCULATED.3IONS-SCNC: 4 MMOL/L (ref 4–13)
AST SERPL W P-5'-P-CCNC: 26 U/L (ref 5–45)
BASOPHILS # BLD AUTO: 0.03 THOUSANDS/ΜL (ref 0–0.1)
BASOPHILS NFR BLD AUTO: 1 % (ref 0–1)
BILIRUB SERPL-MCNC: 0.9 MG/DL (ref 0.2–1)
BUN SERPL-MCNC: 23 MG/DL (ref 5–25)
CALCIUM SERPL-MCNC: 8.5 MG/DL (ref 8.3–10.1)
CHLORIDE SERPL-SCNC: 110 MMOL/L (ref 100–108)
CHOLEST SERPL-MCNC: 188 MG/DL (ref 50–200)
CO2 SERPL-SCNC: 27 MMOL/L (ref 21–32)
CREAT SERPL-MCNC: 0.61 MG/DL (ref 0.6–1.3)
EOSINOPHIL # BLD AUTO: 0.13 THOUSAND/ΜL (ref 0–0.61)
EOSINOPHIL NFR BLD AUTO: 5 % (ref 0–6)
ERYTHROCYTE [DISTWIDTH] IN BLOOD BY AUTOMATED COUNT: 12.6 % (ref 11.6–15.1)
GFR SERPL CREATININE-BSD FRML MDRD: 100 ML/MIN/1.73SQ M
GLUCOSE P FAST SERPL-MCNC: 87 MG/DL (ref 65–99)
HCT VFR BLD AUTO: 38.1 % (ref 34.8–46.1)
HCV AB SER QL: NORMAL
HDLC SERPL-MCNC: 84 MG/DL
HGB BLD-MCNC: 13.1 G/DL (ref 11.5–15.4)
IMM GRANULOCYTES # BLD AUTO: 0 THOUSAND/UL (ref 0–0.2)
IMM GRANULOCYTES NFR BLD AUTO: 0 % (ref 0–2)
LDLC SERPL CALC-MCNC: 98 MG/DL (ref 0–100)
LYMPHOCYTES # BLD AUTO: 1.1 THOUSANDS/ΜL (ref 0.6–4.47)
LYMPHOCYTES NFR BLD AUTO: 38 % (ref 14–44)
MAGNESIUM SERPL-MCNC: 2.4 MG/DL (ref 1.6–2.6)
MCH RBC QN AUTO: 33 PG (ref 26.8–34.3)
MCHC RBC AUTO-ENTMCNC: 34.4 G/DL (ref 31.4–37.4)
MCV RBC AUTO: 96 FL (ref 82–98)
MONOCYTES # BLD AUTO: 0.25 THOUSAND/ΜL (ref 0.17–1.22)
MONOCYTES NFR BLD AUTO: 9 % (ref 4–12)
NEUTROPHILS # BLD AUTO: 1.37 THOUSANDS/ΜL (ref 1.85–7.62)
NEUTS SEG NFR BLD AUTO: 47 % (ref 43–75)
NRBC BLD AUTO-RTO: 0 /100 WBCS
PLATELET # BLD AUTO: 274 THOUSANDS/UL (ref 149–390)
PMV BLD AUTO: 10 FL (ref 8.9–12.7)
POTASSIUM SERPL-SCNC: 3.8 MMOL/L (ref 3.5–5.3)
PROT SERPL-MCNC: 7 G/DL (ref 6.4–8.2)
RBC # BLD AUTO: 3.97 MILLION/UL (ref 3.81–5.12)
SODIUM SERPL-SCNC: 141 MMOL/L (ref 136–145)
TRIGL SERPL-MCNC: 32 MG/DL
TSH SERPL DL<=0.05 MIU/L-ACNC: 1.56 UIU/ML (ref 0.36–3.74)
URATE SERPL-MCNC: 3.5 MG/DL (ref 2–6.8)
WBC # BLD AUTO: 2.88 THOUSAND/UL (ref 4.31–10.16)

## 2021-09-18 PROCEDURE — 86803 HEPATITIS C AB TEST: CPT

## 2021-09-18 PROCEDURE — 80053 COMPREHEN METABOLIC PANEL: CPT

## 2021-09-18 PROCEDURE — 84443 ASSAY THYROID STIM HORMONE: CPT

## 2021-09-18 PROCEDURE — 85025 COMPLETE CBC W/AUTO DIFF WBC: CPT

## 2021-09-18 PROCEDURE — 36415 COLL VENOUS BLD VENIPUNCTURE: CPT

## 2021-09-18 PROCEDURE — 80061 LIPID PANEL: CPT

## 2021-09-18 PROCEDURE — 84550 ASSAY OF BLOOD/URIC ACID: CPT

## 2021-09-18 PROCEDURE — 86618 LYME DISEASE ANTIBODY: CPT

## 2021-09-18 PROCEDURE — 87389 HIV-1 AG W/HIV-1&-2 AB AG IA: CPT

## 2021-09-18 PROCEDURE — 83735 ASSAY OF MAGNESIUM: CPT

## 2021-09-19 LAB — HIV 1+2 AB+HIV1 P24 AG SERPL QL IA: NORMAL

## 2021-09-21 LAB — B BURGDOR IGG+IGM SER-ACNC: 50

## 2021-12-16 ENCOUNTER — RA CDI HCC (OUTPATIENT)
Dept: OTHER | Facility: HOSPITAL | Age: 56
End: 2021-12-16

## 2021-12-23 ENCOUNTER — OFFICE VISIT (OUTPATIENT)
Dept: FAMILY MEDICINE CLINIC | Facility: CLINIC | Age: 56
End: 2021-12-23
Payer: COMMERCIAL

## 2021-12-23 VITALS
RESPIRATION RATE: 16 BRPM | HEIGHT: 62 IN | WEIGHT: 118 LBS | OXYGEN SATURATION: 99 % | SYSTOLIC BLOOD PRESSURE: 108 MMHG | TEMPERATURE: 98.2 F | BODY MASS INDEX: 21.71 KG/M2 | DIASTOLIC BLOOD PRESSURE: 64 MMHG | HEART RATE: 65 BPM

## 2021-12-23 DIAGNOSIS — N39.44 BED WETTING: ICD-10-CM

## 2021-12-23 DIAGNOSIS — N32.81 OAB (OVERACTIVE BLADDER): ICD-10-CM

## 2021-12-23 DIAGNOSIS — M25.551 HIP PAIN, RIGHT: Primary | ICD-10-CM

## 2021-12-23 PROCEDURE — 3008F BODY MASS INDEX DOCD: CPT | Performed by: FAMILY MEDICINE

## 2021-12-23 PROCEDURE — 3725F SCREEN DEPRESSION PERFORMED: CPT | Performed by: FAMILY MEDICINE

## 2021-12-23 PROCEDURE — 99213 OFFICE O/P EST LOW 20 MIN: CPT | Performed by: FAMILY MEDICINE

## 2021-12-23 PROCEDURE — 1036F TOBACCO NON-USER: CPT | Performed by: FAMILY MEDICINE

## 2021-12-23 RX ORDER — DESMOPRESSIN ACETATE 0.2 MG/1
0.2 TABLET ORAL 2 TIMES DAILY
Qty: 180 TABLET | Refills: 3 | Status: SHIPPED | OUTPATIENT
Start: 2021-12-23 | End: 2022-06-22

## 2022-02-02 DIAGNOSIS — N32.81 OAB (OVERACTIVE BLADDER): ICD-10-CM

## 2022-02-02 RX ORDER — OXYBUTYNIN CHLORIDE 15 MG/1
TABLET, EXTENDED RELEASE ORAL
Qty: 30 TABLET | Refills: 1 | Status: SHIPPED | OUTPATIENT
Start: 2022-02-02 | End: 2022-04-13

## 2022-03-14 DIAGNOSIS — N95.1 VAGINAL DRYNESS, MENOPAUSAL: ICD-10-CM

## 2022-03-14 RX ORDER — ESTRADIOL 0.1 MG/G
CREAM VAGINAL
Qty: 42.5 G | Refills: 1 | Status: SHIPPED | OUTPATIENT
Start: 2022-03-14 | End: 2022-07-12 | Stop reason: SDUPTHER

## 2022-04-13 DIAGNOSIS — N32.81 OAB (OVERACTIVE BLADDER): ICD-10-CM

## 2022-04-13 RX ORDER — OXYBUTYNIN CHLORIDE 15 MG/1
TABLET, EXTENDED RELEASE ORAL
Qty: 30 TABLET | Refills: 1 | Status: SHIPPED | OUTPATIENT
Start: 2022-04-13 | End: 2022-06-13

## 2022-06-12 DIAGNOSIS — N32.81 OAB (OVERACTIVE BLADDER): ICD-10-CM

## 2022-06-13 RX ORDER — OXYBUTYNIN CHLORIDE 15 MG/1
TABLET, EXTENDED RELEASE ORAL
Qty: 30 TABLET | Refills: 1 | Status: SHIPPED | OUTPATIENT
Start: 2022-06-13 | End: 2022-06-22 | Stop reason: SDUPTHER

## 2022-06-15 ENCOUNTER — EVALUATION (OUTPATIENT)
Dept: PHYSICAL THERAPY | Facility: MEDICAL CENTER | Age: 57
End: 2022-06-15
Payer: COMMERCIAL

## 2022-06-15 DIAGNOSIS — G89.29 CHRONIC RIGHT SHOULDER PAIN: Primary | ICD-10-CM

## 2022-06-15 DIAGNOSIS — M25.511 CHRONIC RIGHT SHOULDER PAIN: Primary | ICD-10-CM

## 2022-06-15 PROCEDURE — 97161 PT EVAL LOW COMPLEX 20 MIN: CPT

## 2022-06-15 PROCEDURE — 97112 NEUROMUSCULAR REEDUCATION: CPT

## 2022-06-15 NOTE — PROGRESS NOTES
PT Evaluation     Today's date: 6/15/2022  Patient name: Jacalyn Fleischer  : 1965  MRN: [id number]  Referring provider: Jose Salgado MD  Dx:   Encounter Diagnosis     ICD-10-CM    1  Chronic right shoulder pain  M25 511     G89 29        Start Time: 1145  Stop Time: 1230  Total time in clinic (min): 45 minutes    Assessment  Assessment details: Rashid Vieira is a 55y/o male who presents to OP PT with a a referral from Dr Clover Matamoros with a medical diagnosis of shoulder pain, Right Upon examination pt presents with decreased and painful R shoulder ROM, decreased R UE strength, decreased functional mobility, decreased muscular endurance, poor balance and increased pain  Previously mentioned deficits have impaired patients ability to perform ADLs, recreational activities and house hold duties  Pt was educated on examination findings, diagnosis, prognosis, home exercise program to complete  Pt states understanding and consents to skilled PT services  Pt is a good candidate for skilled PT services  Positive prognositic indicators include desire to improve and active lifestyle  Negative prognostic indicators include chronicity of issue   Pt would benefit from skilled PT services to address functional deficits to return to PLOF  Impairments: abnormal muscle firing, abnormal or restricted ROM, abnormal movement, activity intolerance, impaired physical strength, lacks appropriate home exercise program, pain with function, scapular dyskinesis and poor posture     Symptom irritability: moderateUnderstanding of Dx/Px/POC: good  Goals  STG (2-6 weeks)    1  Patient will be able to perform shoulder flexion/abduction  of atleast 3/5 MMT to allpow for proper UE positioning at desk to fulfill desk work requirements    2  Pt will improve MMT by 1/2 muscle grade in all deficient planes    3  Pt will report a 2-3 point decrease on NPRS for improved tolerance to ADLs, recreational activities and work duties  LTG   (by time of discharge)     1  Patient will be independent with HEP for continued progress  2  Patient will attain FOTO score of anticipated or greater at time of discharge  3  Patient will attain symmetrical IR to perform ADLs of grooming, dressing and washing  4  Patient will attain 150-180 degrees of GH flexion and abduction to place objects overhead  5   Patient will demonstrate 5/5 MMT in all deficient planes  Plan  Patient would benefit from: PT eval and skilled physical therapy  Referral necessary: No  Planned modality interventions: cryotherapy, TENS, thermotherapy: hydrocollator packs, high voltage pulsed current: pain management, high voltage pulsed current: spasm management and electrical stimulation/Russian stimulation  Planned therapy interventions: joint mobilization, manual therapy, massage, ADL training, ADL retraining, Luis taping, motor coordination training, neuromuscular re-education, patient education, postural training, strengthening, stretching, therapeutic activities, therapeutic exercise, flexibility, functional ROM exercises, graded exercise, home exercise program, abdominal trunk stabilization, activity modification, orthotic fitting/training, orthotic management and training, muscle pump exercises, balance/weight bearing training, body mechanics training, gait training, graded activity and graded motor  Frequency: 2x week  Plan of Care beginning date: 6/15/2022  Plan of Care expiration date: 8/24/2022  Treatment plan discussed with: patient        Subjective Evaluation    History of Present Illness  Mechanism of injury: Subjective: Patient states a three year history of R shoulder pain  She states she has an increase in pain when mowing her grass  She states repetitive movements including washing,cleanig seem to increase pain  Patient is very active and exercises at home while also performing yard/house chores  Patient works as a teacher  Pain  Type: sharp first, then throbbing  Current: 5/10  Best: 0/10  Worst: 9/10  Alleviates: n/a  Aggravates: repetitive motions, lifting heavy objects    Occupation: teacher    Imaging:  unremarkable    PMH: n/a    Previous Surgeries: n/a    Previous Physical Activity:    Current Medications: see file    LISA: insidious    Surgery Date: n/a     MD: Dr Priscila Schaefer    Patient Goals: be out of pain               Objective     Palpation     Right   Tenderness of the supraspinatus, teres major and teres minor  Tenderness     Right Shoulder  Tenderness in the biceps tendon (proximal), infraspinatus tendon and supraspinatus tendon  Active Range of Motion   Left Shoulder   Normal active range of motion  External rotation BTH: C7   Internal rotation BTB: T4     Right Shoulder   Flexion: 155 degrees   Abduction: 140 degrees   External rotation BTH: C7   Internal rotation BTB: T4     Passive Range of Motion   Left Shoulder   Normal passive range of motion    Right Shoulder   Normal passive range of motion    Strength/Myotome Testing     Left Shoulder     Isolated Muscles   Lower trapezius: 3   Middle trapezius: 3     Right Shoulder     Planes of Motion   Flexion: 4-   Extension: 4-   Abduction: 4-   External rotation at 0°: 4-   Internal rotation at 0°: 4-     Isolated Muscles   Lower trapezius: 3   Middle trapezius: 3     Tests     Right Shoulder   Positive empty can, Hawkin's and Neer's                Precautions: chronicity of symptoms      Manuals 6/15/2022              IASTM             (-) pressure IASTM                                       Neuro Re-Ed             ER Iso HEP            TB Row HEP            ER/IR             Serratus Action                                                    Ther Ex             Pulley             PC Stretch HEP            AAROm Scpation HEP                                                                             Ther Activity                                       Gait Training Modalities

## 2022-06-15 NOTE — LETTER
Otilia 15, 2022    [address] [id number]    Patient: Aliza Aj   YOB: 1965   Date of Visit: 6/15/2022     Encounter Diagnosis     ICD-10-CM    1  Chronic right shoulder pain  M25 511     G89 29        Dear Dr Jose Wilson:    Thank you for your recent referral of Aliza Aj  Please review the attached evaluation summary from Ariela's recent visit  Please verify that you agree with the plan of care by signing the attached order  If you have any questions or concerns, please do not hesitate to call  I sincerely appreciate the opportunity to share in the care of one of your patients and hope to have another opportunity to work with you in the near future  Sincerely,    Rosa M Oliveros, PT      Referring Provider:      I certify that I have read the below Plan of Care and certify the need for these services furnished under this plan of treatment while under my care  Marco Reardon MD  [facility] [address]  Via Fax: [phone]          PT Evaluation     Today's date: 6/15/2022  Patient name: Aliza Aj  : 1965  MRN: [id number]  Referring provider: Jennifer Kee MD  Dx:   Encounter Diagnosis     ICD-10-CM    1  Chronic right shoulder pain  M25 511     G89 29        Start Time: 1145  Stop Time: 1230  Total time in clinic (min): 45 minutes    Assessment  Assessment details: Shawn Carrasco is a 55y/o male who presents to OP PT with a a referral from Dr Jose Wilson with a medical diagnosis of shoulder pain, Right Upon examination pt presents with decreased and painful R shoulder ROM, decreased R UE strength, decreased functional mobility, decreased muscular endurance, poor balance and increased pain  Previously mentioned deficits have impaired patients ability to perform ADLs, recreational activities and house hold duties   Pt was educated on examination findings, diagnosis, prognosis, home exercise program to complete  Pt states understanding and consents to skilled PT services  Pt is a good candidate for skilled PT services  Positive prognositic indicators include desire to improve and active lifestyle  Negative prognostic indicators include chronicity of issue   Pt would benefit from skilled PT services to address functional deficits to return to PLOF  Impairments: abnormal muscle firing, abnormal or restricted ROM, abnormal movement, activity intolerance, impaired physical strength, lacks appropriate home exercise program, pain with function, scapular dyskinesis and poor posture     Symptom irritability: moderateUnderstanding of Dx/Px/POC: good  Goals  STG (2-6 weeks)    1  Patient will be able to perform shoulder flexion/abduction  of atleast 3/5 MMT to allpow for proper UE positioning at desk to fulfill desk work requirements    2  Pt will improve MMT by 1/2 muscle grade in all deficient planes    3  Pt will report a 2-3 point decrease on NPRS for improved tolerance to ADLs, recreational activities and work duties  LTG   (by time of discharge)     1  Patient will be independent with HEP for continued progress  2  Patient will attain FOTO score of anticipated or greater at time of discharge  3  Patient will attain symmetrical IR to perform ADLs of grooming, dressing and washing  4  Patient will attain 150-180 degrees of GH flexion and abduction to place objects overhead  5   Patient will demonstrate 5/5 MMT in all deficient planes         Plan  Patient would benefit from: PT eval and skilled physical therapy  Referral necessary: No  Planned modality interventions: cryotherapy, TENS, thermotherapy: hydrocollator packs, high voltage pulsed current: pain management, high voltage pulsed current: spasm management and electrical stimulation/Russian stimulation  Planned therapy interventions: joint mobilization, manual therapy, massage, ADL training, ADL retraining, Luis taping, motor coordination training, neuromuscular re-education, patient education, postural training, strengthening, stretching, therapeutic activities, therapeutic exercise, flexibility, functional ROM exercises, graded exercise, home exercise program, abdominal trunk stabilization, activity modification, orthotic fitting/training, orthotic management and training, muscle pump exercises, balance/weight bearing training, body mechanics training, gait training, graded activity and graded motor  Frequency: 2x week  Plan of Care beginning date: 6/15/2022  Plan of Care expiration date: 8/24/2022  Treatment plan discussed with: patient        Subjective Evaluation    History of Present Illness  Mechanism of injury: Subjective: Patient states a three year history of R shoulder pain  She states she has an increase in pain when mowing her grass  She states repetitive movements including washing,cleanig seem to increase pain  Patient is very active and exercises at home while also performing yard/house chores  Patient works as a teacher  Pain  Type: sharp first, then throbbing  Current: 5/10  Best: 0/10  Worst: 9/10  Alleviates: n/a  Aggravates: repetitive motions, lifting heavy objects    Occupation: teacher    Imaging:  unremarkable    PMH: n/a    Previous Surgeries: n/a    Previous Physical Activity:    Current Medications: see file    LISA: insidious    Surgery Date: n/a     MD: Dr Radha Pérez    Patient Goals: be out of pain               Objective     Palpation     Right   Tenderness of the supraspinatus, teres major and teres minor  Tenderness     Right Shoulder  Tenderness in the biceps tendon (proximal), infraspinatus tendon and supraspinatus tendon       Active Range of Motion   Left Shoulder   Normal active range of motion  External rotation BTH: C7   Internal rotation BTB: T4     Right Shoulder   Flexion: 155 degrees   Abduction: 140 degrees   External rotation BTH: C7   Internal rotation BTB: T4     Passive Range of Motion   Left Shoulder   Normal passive range of motion    Right Shoulder   Normal passive range of motion    Strength/Myotome Testing     Left Shoulder     Isolated Muscles   Lower trapezius: 3   Middle trapezius: 3     Right Shoulder     Planes of Motion   Flexion: 4-   Extension: 4-   Abduction: 4-   External rotation at 0°: 4-   Internal rotation at 0°: 4-     Isolated Muscles   Lower trapezius: 3   Middle trapezius: 3     Tests     Right Shoulder   Positive empty can, Hawkin's and Neer's                Precautions: chronicity of symptoms      Manuals 6/15/2022              IASTM             (-) pressure IASTM                                       Neuro Re-Ed             ER Iso HEP            TB Row HEP            ER/IR             Serratus Action                                                    Ther Ex             Pulley             PC Stretch HEP            AAROm Scpation HEP                                                                             Ther Activity                                       Gait Training                                       Modalities

## 2022-06-20 ENCOUNTER — EVALUATION (OUTPATIENT)
Dept: PHYSICAL THERAPY | Facility: MEDICAL CENTER | Age: 57
End: 2022-06-20
Payer: COMMERCIAL

## 2022-06-20 DIAGNOSIS — M25.511 CHRONIC RIGHT SHOULDER PAIN: Primary | ICD-10-CM

## 2022-06-20 DIAGNOSIS — G89.29 CHRONIC RIGHT SHOULDER PAIN: Primary | ICD-10-CM

## 2022-06-20 DIAGNOSIS — M17.0 PRIMARY OSTEOARTHRITIS OF BOTH KNEES: ICD-10-CM

## 2022-06-20 PROCEDURE — 97140 MANUAL THERAPY 1/> REGIONS: CPT

## 2022-06-20 PROCEDURE — 97112 NEUROMUSCULAR REEDUCATION: CPT

## 2022-06-20 PROCEDURE — 97164 PT RE-EVAL EST PLAN CARE: CPT

## 2022-06-20 NOTE — PROGRESS NOTES
PT Re-Evaluation     Today's date: 2022  Patient name: Rosi Mckeon  : 1965  MRN: [id number]  Referring provider: Wang Arteaga MD  Dx:   Encounter Diagnosis     ICD-10-CM    1  Chronic right shoulder pain  M25 511     G89 29    2  Chronic pain of both knees  M25 561     M25 562     G89 29        Start Time: 1505  Stop Time: 1545  Total time in clinic (min): 40 minutes    Assessment  Assessment details: 22:  Pt presents to first f/u with new order for bilateral knee pain  Patient demonstrates decreased bilateral LE strength, with restrictions in bilateral ER/IR rotation of the hip  Patient has painful functional mobility, with reported difficulty performing stairs, squatting and kneeling  These deficits have impaired patient's ability to perform ADLs, recreational activites and work duties  Patient remains a good candidate for skilled PT services  Monisha Glasgow is a 55y/o male who presents to OP PT with a a referral from Dr Robetr Ashraf with a medical diagnosis of shoulder pain, Right Upon examination pt presents with decreased and painful R shoulder ROM, decreased R UE strength, decreased functional mobility, decreased muscular endurance, poor balance and increased pain  Previously mentioned deficits have impaired patients ability to perform ADLs, recreational activities and house hold duties  Pt was educated on examination findings, diagnosis, prognosis, home exercise program to complete  Pt states understanding and consents to skilled PT services  Pt is a good candidate for skilled PT services  Positive prognositic indicators include desire to improve and active lifestyle  Negative prognostic indicators include chronicity of issue   Pt would benefit from skilled PT services to address functional deficits to return to PLOF          Impairments: abnormal muscle firing, abnormal or restricted ROM, abnormal movement, activity intolerance, impaired physical strength, lacks appropriate home exercise program, pain with function, scapular dyskinesis and poor posture     Symptom irritability: moderateUnderstanding of Dx/Px/POC: good  Goals  STG (2-6 weeks)    1  Patient will be able to perform shoulder flexion/abduction  of atleast 3/5 MMT to allpow for proper UE positioning at desk to fulfill desk work requirements    2  Pt will improve MMT by 1/2 muscle grade in all deficient planes    3  Pt will report a 2-3 point decrease on NPRS for improved tolerance to ADLs, recreational activities and work duties  4    Pt will improve LE MMT by 1/2 muscle grade  6/20/22       LTG   (by time of discharge)     1  Patient will be independent with HEP for continued progress  2  Patient will attain FOTO score of anticipated or greater at time of discharge  3  Patient will attain symmetrical IR to perform ADLs of grooming, dressing and washing  4  Patient will attain 150-180 degrees of GH flexion and abduction to place objects overhead  5   Patient will demonstrate 5/5 MMT in all deficient planes  6  Pt will ascend and descend stairs with 0/10 pain   6/20/22    Plan  Patient would benefit from: PT eval and skilled physical therapy  Referral necessary: No  Planned modality interventions: cryotherapy, TENS, thermotherapy: hydrocollator packs, high voltage pulsed current: pain management, high voltage pulsed current: spasm management and electrical stimulation/Russian stimulation  Planned therapy interventions: joint mobilization, manual therapy, massage, ADL training, ADL retraining, Luis taping, motor coordination training, neuromuscular re-education, patient education, postural training, strengthening, stretching, therapeutic activities, therapeutic exercise, flexibility, functional ROM exercises, graded exercise, home exercise program, abdominal trunk stabilization, activity modification, orthotic fitting/training, orthotic management and training, muscle pump exercises, balance/weight bearing training, body mechanics training, gait training, graded activity and graded motor  Frequency: 2x week  Plan of Care beginning date: 6/15/2022  Plan of Care expiration date: 8/24/2022  Treatment plan discussed with: patient        Subjective Evaluation    History of Present Illness  Mechanism of injury: Subjective: 6/20/22: Pt presents with new referral for bilateral knee pain  Pt states a chronic history of bilateral knee pain  Pain is exacerbated by climbing stairs, pushing , walking up and down hill  She states she had been performing LE strengthening exercise and it had improved symptoms  But states she has hit a stand still as far as progress  Patient states a three year history of R shoulder pain  She states she has an increase in pain when mowing her grass  She states repetitive movements including washing,cleanig seem to increase pain  Patient is very active and exercises at home while also performing yard/house chores  Patient works as a teacher  Pain  Type: sharp first, then throbbing  Current: 5/10  Best: 0/10  Worst: 9/10  Alleviates: n/a  Aggravates: repetitive motions, lifting heavy objects    Occupation: teacher    Imaging:  unremarkable    PMH: n/a    Previous Surgeries: n/a    Previous Physical Activity:    Current Medications: see file    LISA: insidious    Surgery Date: n/a     MD: Dr Irasema Reyes    Patient Goals: be out of pain               Objective     Palpation     Right   Tenderness of the supraspinatus, teres major and teres minor  Tenderness     Right Shoulder  Tenderness in the biceps tendon (proximal), infraspinatus tendon and supraspinatus tendon  Left Knee   Tenderness in the lateral joint line and medial joint line       Active Range of Motion   Left Shoulder   Normal active range of motion  External rotation BTH: C7   Internal rotation BTB: T4     Right Shoulder   Flexion: 155 degrees   Abduction: 140 degrees   External rotation BTH: C7 Internal rotation BTB: T4   Left Knee   Normal active range of motion    Right Knee   Normal active range of motion    Passive Range of Motion   Left Shoulder   Normal passive range of motion    Right Shoulder   Normal passive range of motion  Left Knee   Flexion: WFL  Extension: WFL    Right Knee   Flexion: WFL  Extension: WFL    Additional Passive Range of Motion Details  Limited to about 75% in bilateral IR/ER rotation    Strength/Myotome Testing     Left Shoulder     Isolated Muscles   Lower trapezius: 3   Middle trapezius: 3     Right Shoulder     Planes of Motion   Flexion: 4-   Extension: 4-   Abduction: 4-   External rotation at 0°: 4-   Internal rotation at 0°: 4-     Isolated Muscles   Lower trapezius: 3   Middle trapezius: 3     Left Knee   Flexion: 4  Extension: 4    Right Knee   Flexion: 4  Extension: 4    Additional Strength Details  Painful R knee ext    Tests     Right Shoulder   Positive empty can, Hawkin's and Neer's                Precautions: chronicity of symptoms      Manuals 6/20/2022 6/20/2022             IASTM  posterior cuff, R UT           (-) pressure IASTM                                       Neuro Re-Ed             ER Iso HEP HEP           TB Row HEP GTB 20x           ER/IR  RTB 2x10 each, painful with second set of ER           Serratus Action             SL ER  1# 3x10                                     Ther Ex             Pulley  NV           PC Stretch HEP 30" 3x           AAROm Scpation HEP 20x            Sleeper Stretch  10" 10x           UT Stretch  30" 3x                                                  Ther Activity                                       Gait Training                                       Modalities

## 2022-06-20 NOTE — LETTER
2022    [address] [phone]    Patient: Kendall Candelario   YOB: 1965   Date of Visit: 2022     Encounter Diagnosis     ICD-10-CM    1  Chronic right shoulder pain  M25 511     G89 29    2  Primary osteoarthritis of both knees  M17 0        Dear Dr Pérez Median:    Thank you for your recent referral of Kendall Candelario  Please review the attached evaluation summary from Ariela's recent visit  Please verify that you agree with the plan of care by signing the attached order  If you have any questions or concerns, please do not hesitate to call  I sincerely appreciate the opportunity to share in the care of one of your patients and hope to have another opportunity to work with you in the near future  Sincerely,    Baldwin Phalen, PT      Referring Provider:      I certify that I have read the below Plan of Care and certify the need for these services furnished under this plan of treatment while under my care  Matilad Malik MD  [place] [id number]  Via Fax: [phone]          PT Re-Evaluation     Today's date: 2022  Patient name: Kendall Candelario  : 1965  MRN: [id number]  Referring provider: Kenton Cain MD  Dx:   Encounter Diagnosis     ICD-10-CM    1  Chronic right shoulder pain  M25 511     G89 29    2  Chronic pain of both knees  M25 561     M25 562     G89 29        Start Time: 1505  Stop Time: 1545  Total time in clinic (min): 40 minutes    Assessment  Assessment details: 22:  Pt presents to first f/u with new order for bilateral knee pain  Patient demonstrates decreased bilateral LE strength, with restrictions in bilateral ER/IR rotation of the hip  Patient has painful functional mobility, with reported difficulty performing stairs, squatting and kneeling   These deficits have impaired patient's ability to perform ADLs, recreational activites and work duties  Patient remains a good candidate for skilled PT services  Lesly Colbert is a 55y/o male who presents to OP PT with a a referral from Dr Priscila Schaefer with a medical diagnosis of shoulder pain, Right Upon examination pt presents with decreased and painful R shoulder ROM, decreased R UE strength, decreased functional mobility, decreased muscular endurance, poor balance and increased pain  Previously mentioned deficits have impaired patients ability to perform ADLs, recreational activities and house hold duties  Pt was educated on examination findings, diagnosis, prognosis, home exercise program to complete  Pt states understanding and consents to skilled PT services  Pt is a good candidate for skilled PT services  Positive prognositic indicators include desire to improve and active lifestyle  Negative prognostic indicators include chronicity of issue   Pt would benefit from skilled PT services to address functional deficits to return to PLOF  Impairments: abnormal muscle firing, abnormal or restricted ROM, abnormal movement, activity intolerance, impaired physical strength, lacks appropriate home exercise program, pain with function, scapular dyskinesis and poor posture     Symptom irritability: moderateUnderstanding of Dx/Px/POC: good  Goals  STG (2-6 weeks)    1  Patient will be able to perform shoulder flexion/abduction  of atleast 3/5 MMT to allpow for proper UE positioning at desk to fulfill desk work requirements    2  Pt will improve MMT by 1/2 muscle grade in all deficient planes    3  Pt will report a 2-3 point decrease on NPRS for improved tolerance to ADLs, recreational activities and work duties  4    Pt will improve LE MMT by 1/2 muscle grade  6/20/22       LTG   (by time of discharge)     1  Patient will be independent with HEP for continued progress  2  Patient will attain FOTO score of anticipated or greater at time of discharge       3  Patient will attain symmetrical IR to perform ADLs of grooming, dressing and washing  4  Patient will attain 150-180 degrees of GH flexion and abduction to place objects overhead  5   Patient will demonstrate 5/5 MMT in all deficient planes  6  Pt will ascend and descend stairs with 0/10 pain  6/20/22    Plan  Patient would benefit from: PT eval and skilled physical therapy  Referral necessary: No  Planned modality interventions: cryotherapy, TENS, thermotherapy: hydrocollator packs, high voltage pulsed current: pain management, high voltage pulsed current: spasm management and electrical stimulation/Russian stimulation  Planned therapy interventions: joint mobilization, manual therapy, massage, ADL training, ADL retraining, Luis taping, motor coordination training, neuromuscular re-education, patient education, postural training, strengthening, stretching, therapeutic activities, therapeutic exercise, flexibility, functional ROM exercises, graded exercise, home exercise program, abdominal trunk stabilization, activity modification, orthotic fitting/training, orthotic management and training, muscle pump exercises, balance/weight bearing training, body mechanics training, gait training, graded activity and graded motor  Frequency: 2x week  Plan of Care beginning date: 6/15/2022  Plan of Care expiration date: 8/24/2022  Treatment plan discussed with: patient        Subjective Evaluation    History of Present Illness  Mechanism of injury: Subjective: 6/20/22: Pt presents with new referral for bilateral knee pain  Pt states a chronic history of bilateral knee pain  Pain is exacerbated by climbing stairs, pushing , walking up and down hill  She states she had been performing LE strengthening exercise and it had improved symptoms  But states she has hit a stand still as far as progress  Patient states a three year history of R shoulder pain  She states she has an increase in pain when mowing her grass   She states repetitive movements including washing,cleanig seem to increase pain  Patient is very active and exercises at home while also performing yard/house chores  Patient works as a teacher  Pain  Type: sharp first, then throbbing  Current: 5/10  Best: 0/10  Worst: 9/10  Alleviates: n/a  Aggravates: repetitive motions, lifting heavy objects    Occupation: teacher    Imaging:  unremarkable    PMH: n/a    Previous Surgeries: n/a    Previous Physical Activity:    Current Medications: see file    LISA: insidious    Surgery Date: n/a     MD: Dr Daniel Galeas    Patient Goals: be out of pain               Objective     Palpation     Right   Tenderness of the supraspinatus, teres major and teres minor  Tenderness     Right Shoulder  Tenderness in the biceps tendon (proximal), infraspinatus tendon and supraspinatus tendon  Left Knee   Tenderness in the lateral joint line and medial joint line       Active Range of Motion   Left Shoulder   Normal active range of motion  External rotation BTH: C7   Internal rotation BTB: T4     Right Shoulder   Flexion: 155 degrees   Abduction: 140 degrees   External rotation BTH: C7   Internal rotation BTB: T4   Left Knee   Normal active range of motion    Right Knee   Normal active range of motion    Passive Range of Motion   Left Shoulder   Normal passive range of motion    Right Shoulder   Normal passive range of motion  Left Knee   Flexion: WFL  Extension: WFL    Right Knee   Flexion: WFL  Extension: WFL    Additional Passive Range of Motion Details  Limited to about 75% in bilateral IR/ER rotation    Strength/Myotome Testing     Left Shoulder     Isolated Muscles   Lower trapezius: 3   Middle trapezius: 3     Right Shoulder     Planes of Motion   Flexion: 4-   Extension: 4-   Abduction: 4-   External rotation at 0°: 4-   Internal rotation at 0°: 4-     Isolated Muscles   Lower trapezius: 3   Middle trapezius: 3     Left Knee   Flexion: 4  Extension: 4    Right Knee   Flexion: 4  Extension: 4    Additional Strength Details  Painful R knee ext    Tests     Right Shoulder   Positive empty can, Hawkin's and Neer's                Precautions: chronicity of symptoms      Manuals 6/20/2022 6/20/2022             IASTM  posterior cuff, R UT           (-) pressure IASTM                                       Neuro Re-Ed             ER Iso HEP HEP           TB Row HEP GTB 20x           ER/IR  RTB 2x10 each, painful with second set of ER           Serratus Action             SL ER  1# 3x10                                     Ther Ex             Pulley  NV           PC Stretch HEP 30" 3x           AAROm Scpation HEP 20x            Sleeper Stretch  10" 10x           UT Stretch  30" 3x                                                  Ther Activity                                       Gait Training                                       Modalities

## 2022-06-22 ENCOUNTER — OFFICE VISIT (OUTPATIENT)
Dept: FAMILY MEDICINE CLINIC | Facility: CLINIC | Age: 57
End: 2022-06-22
Payer: COMMERCIAL

## 2022-06-22 ENCOUNTER — OFFICE VISIT (OUTPATIENT)
Dept: PHYSICAL THERAPY | Facility: MEDICAL CENTER | Age: 57
End: 2022-06-22
Payer: COMMERCIAL

## 2022-06-22 VITALS
DIASTOLIC BLOOD PRESSURE: 72 MMHG | HEART RATE: 45 BPM | TEMPERATURE: 98 F | OXYGEN SATURATION: 98 % | HEIGHT: 62 IN | WEIGHT: 109 LBS | BODY MASS INDEX: 20.06 KG/M2 | SYSTOLIC BLOOD PRESSURE: 108 MMHG

## 2022-06-22 DIAGNOSIS — M17.0 PRIMARY OSTEOARTHRITIS OF BOTH KNEES: ICD-10-CM

## 2022-06-22 DIAGNOSIS — N39.44 BED WETTING: Primary | ICD-10-CM

## 2022-06-22 DIAGNOSIS — Z78.0 MENOPAUSE: ICD-10-CM
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
## 2022-06-22 DIAGNOSIS — Z13.220 SCREENING CHOLESTEROL LEVEL: ICD-10-CM

## 2022-06-22 DIAGNOSIS — L30.9 DERMATITIS: ICD-10-CM

## 2022-06-22 DIAGNOSIS — G89.29 CHRONIC RIGHT SHOULDER PAIN: Primary | ICD-10-CM

## 2022-06-22 DIAGNOSIS — N32.81 OAB (OVERACTIVE BLADDER): ICD-10-CM

## 2022-06-22 DIAGNOSIS — R53.83 OTHER FATIGUE: ICD-10-CM

## 2022-06-22 DIAGNOSIS — Z12.31 SCREENING MAMMOGRAM FOR HIGH-RISK PATIENT: ICD-10-CM

## 2022-06-22 DIAGNOSIS — M25.511 CHRONIC RIGHT SHOULDER PAIN: Primary | ICD-10-CM

## 2022-06-22 PROCEDURE — 3008F BODY MASS INDEX DOCD: CPT | Performed by: FAMILY MEDICINE

## 2022-06-22 PROCEDURE — 1036F TOBACCO NON-USER: CPT | Performed by: FAMILY MEDICINE

## 2022-06-22 PROCEDURE — 97112 NEUROMUSCULAR REEDUCATION: CPT

## 2022-06-22 PROCEDURE — 97140 MANUAL THERAPY 1/> REGIONS: CPT

## 2022-06-22 PROCEDURE — 99214 OFFICE O/P EST MOD 30 MIN: CPT | Performed by: FAMILY MEDICINE

## 2022-06-22 PROCEDURE — 97110 THERAPEUTIC EXERCISES: CPT

## 2022-06-22 RX ORDER — OXYBUTYNIN CHLORIDE 15 MG/1
15 TABLET, EXTENDED RELEASE ORAL
Qty: 90 TABLET | Refills: 1 | Status: SHIPPED | OUTPATIENT
Start: 2022-06-22

## 2022-06-22 RX ORDER — TRIAMCINOLONE ACETONIDE 5 MG/G
CREAM TOPICAL 3 TIMES DAILY
Qty: 30 G | Refills: 2 | Status: SHIPPED | OUTPATIENT
Start: 2022-06-22

## 2022-06-22 NOTE — PROGRESS NOTES
Assessment/Plan:         Problem List Items Addressed This Visit        Other    Bed wetting - Primary     Patient is stable  and will continue present plan of care and reassess at next routine visit  All questions about this problem from patient were answered today  Other Visit Diagnoses     Other fatigue        Relevant Orders    TSH + Free T4    Comprehensive metabolic panel    CBC and differential    Magnesium    Uric acid    Urinalysis with microscopic    Screening mammogram for high-risk patient        Relevant Orders    Mammo screening bilateral w 3d & cad    Menopause        Relevant Orders    DXA bone density spine hip and pelvis    Screening cholesterol level        Relevant Orders    Lipid Panel with Direct LDL reflex    OAB (overactive bladder)        Relevant Medications    oxybutynin (DITROPAN XL) 15 MG 24 hr tablet    Dermatitis        Relevant Medications    triamcinolone (KENALOG) 0 5 % cream    Tick bite, unspecified site, initial encounter        Relevant Medications    triamcinolone (KENALOG) 0 5 % cream    Other Relevant Orders    Lyme Antibody Profile with reflex to WB            Subjective:      Patient ID: Julia Giron is a 60 y o  female  64-year-old female here today for her checkup on her overactive bladder  Patient doing well with her oxybutynin and is no longer taking her DDAVP        The following portions of the patient's history were reviewed and updated as appropriate:   Past Medical History:  She has a past medical history of Breast cyst, Cholelithiasis, Cystocele, midline, Frequent urination, H/O bilateral breast implants, Headache, Hyperlipidemia, Lyme disease, Mild acid reflux, PONV (postoperative nausea and vomiting), 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, Spider veins of both lower extremities, Umbilical hernia, Umbilical hernia without obstruction and without gangrene (8/11/2020), and Wears glasses  ,  _______________________________________________________________________  Medical Problems:  does not have any pertinent problems on file ,  _______________________________________________________________________  Past Surgical History:   has a past surgical history that includes Colonoscopy (2014); EGD; pr cmbnd anterpost colporraphy w/cysto (N/A, 12/22/2020); pr sling oper stres incontinence (N/A, 12/22/2020); pr cystourethroscopy (N/A, 12/22/2020); pr repair umbilical BULR,5+A/D,CIVVJ (N/A, 12/22/2020); pr lap,cholecystectomy (N/A, 12/22/2020); and Augmentation mammaplasty (Bilateral, 2014)  ,  _______________________________________________________________________  Family History:  family history includes Diabetes in her father; Hypertension in her mother; No Known Problems in her sister, sister, sister, and sister; Other in her son ,  _______________________________________________________________________  Social History:   reports that she has never smoked  She has never used smokeless tobacco  She reports that she does not drink alcohol and does not use drugs  ,  _______________________________________________________________________  Allergies:  has No Known Allergies     _______________________________________________________________________  Current Outpatient Medications   Medication Sig Dispense Refill    Ascorbic Acid (VITAMIN C PO) Take by mouth daily       Bilberry, Vaccinium myrtillus, (BILBERRY EXTRACT PO) Take by mouth daily       BIOTIN PO Take by mouth daily       Calcium Carbonate-Vitamin D (CALCIUM-D PO) Take by mouth daily       COLLAGEN PO Take by mouth daily       Cyanocobalamin (VITAMIN B-12 PO) Take by mouth daily       estradiol (ESTRACE) 0 1 mg/g vaginal cream INSERT 1 GRAM INTO THE VAGINA 2 (TWO) TIMES A WEEK 42 5 g 1    GLUCOSAMINE-CHONDROITIN PO Take by mouth daily       Misc Natural Products (TART CHERRY ADVANCED PO) Take by mouth daily       Misc Natural Products (Turmeric Curcumin) CAPS Take by mouth daily       Multiple Vitamin (DAILY VITAMIN) tablet Take 1 tablet by mouth daily       Multiple Vitamins-Minerals (HAIR VITAMINS PO) Take by mouth daily       NON FORMULARY luti-gold OTC daily/ viviscalpro OTC daily      oxybutynin (DITROPAN XL) 15 MG 24 hr tablet Take 1 tablet (15 mg total) by mouth daily at bedtime 90 tablet 1    PAPAYA PO Take by mouth daily       Pomegranate, Punica granatum, (POMEGRANATE PO) Take by mouth daily       Probiotic Product (PROBIOTIC DAILY PO) Take by mouth daily       Pyridoxine HCl (VITAMIN B6 PO) Take by mouth daily       Red Yeast Rice Extract (RED YEAST RICE PO) Take by mouth daily       triamcinolone (KENALOG) 0 5 % cream Apply topically 3 (three) times a day 30 g 2    VITAMIN D PO Take by mouth daily        Current Facility-Administered Medications   Medication Dose Route Frequency Provider Last Rate Last Admin    ondansetron (ZOFRAN-ODT) dispersible tablet 4 mg  4 mg Oral Q6H PRN Jonnie Morel PA-C   4 mg at 07/28/21 1656     _______________________________________________________________________  Review of Systems      Objective:  Vitals:    06/22/22 1041   BP: 108/72   BP Location: Left arm   Patient Position: Sitting   Cuff Size: Standard   Pulse: (!) 45   Temp: 98 °F (36 7 °C)   TempSrc: Skin   SpO2: 98%   Weight: 49 4 kg (109 lb)   Height: 5' 2" (1 575 m)     Body mass index is 19 94 kg/m²       Physical Exam

## 2022-06-22 NOTE — ASSESSMENT & PLAN NOTE
Markus WILDE - NVS, FOLLOW Patient is stable  and will continue present plan of care and reassess at next routine visit  All questions about this problem from patient were answered today

## 2022-06-22 NOTE — PROGRESS NOTES
Daily Note     Today's date: 2022  Patient name: Isidro Fox  : 1965  MRN: [id number]  Referring provider: Tete Jimenez MD  Dx:   Encounter Diagnosis     ICD-10-CM    1  Chronic right shoulder pain  M25 511     G89 29    2  Primary osteoarthritis of both knees  M17 0        Start Time: 7866  Stop Time: 1557  Total time in clinic (min): 42 minutes    Subjective: Pt states feeling well  Reports minimal soreness  Objective: See treatment diary below      Assessment: Tolerated treatment well  Patient denies pain throughout session  Tenderness noted over posterior cuff this session  Updated HEP given for lower extremity strengthening  Patient demonstrated fatigue post treatment, exhibited good technique with therapeutic exercises and would benefit from continued PT      Plan: Continue per plan of care  Progress treatment as tolerated         Precautions: chronicity of symptoms      Manuals 2022            IASTM  posterior cuff, R UT Posterior cuff,           (-) pressure IASTM                                       Neuro Re-Ed             ER Iso HEP HEP           TB Row HEP GTB 20x           ER/IR  RTB 2x10 each, painful with second set of ER           Serratus Action             SL ER  1# 3x10           SLR   2x10 bilaterally          SL Hip Abd   2x10 bilaterally          Bridge   2x10          Ther Ex             Pulley  NV           PC Stretch HEP 30" 3x 30" 3x          AAROm Scpation HEP 20x            Sleeper Stretch  10" 10x           UT Stretch  30" 3x           It Band Stretch   30" 3x bilaterally          Wall Slide   1x20          LAQ   2x10 bilaterally 7#          Ther Activity                                       Gait Training                                       Modalities

## 2022-06-23 ENCOUNTER — APPOINTMENT (OUTPATIENT)
Dept: PHYSICAL THERAPY | Facility: MEDICAL CENTER | Age: 57
End: 2022-06-23
Payer: COMMERCIAL

## 2022-06-28 ENCOUNTER — OFFICE VISIT (OUTPATIENT)
Dept: PHYSICAL THERAPY | Facility: MEDICAL CENTER | Age: 57
End: 2022-06-28
Payer: COMMERCIAL

## 2022-06-28 DIAGNOSIS — M25.511 CHRONIC RIGHT SHOULDER PAIN: Primary | ICD-10-CM

## 2022-06-28 DIAGNOSIS — G89.29 CHRONIC RIGHT SHOULDER PAIN: Primary | ICD-10-CM

## 2022-06-28 DIAGNOSIS — M17.0 PRIMARY OSTEOARTHRITIS OF BOTH KNEES: ICD-10-CM

## 2022-06-28 PROCEDURE — 97112 NEUROMUSCULAR REEDUCATION: CPT

## 2022-06-28 PROCEDURE — 97140 MANUAL THERAPY 1/> REGIONS: CPT

## 2022-06-28 PROCEDURE — 97110 THERAPEUTIC EXERCISES: CPT

## 2022-06-28 NOTE — PROGRESS NOTES
Daily Note     Today's date: 2022  Patient name: Adore Escamilla  : 1965  MRN: [id number]  Referring provider: Sheridan Jha MD  Dx:   Encounter Diagnosis     ICD-10-CM    1  Chronic right shoulder pain  M25 511     G89 29    2  Primary osteoarthritis of both knees  M17 0                   Subjective: Pt states she spent a lot of time standing this weekend cutting high grass  She has noticed an increase in soreness and weakness since then  Objective: See treatment diary below      Assessment: Tolerated treatment well  Pt initially with painful SL ER, however with stretching and AAROM exercise pain resolves  Pt denies pain with leg press, challenged by resistance  Patient demonstrated fatigue post treatment, exhibited good technique with therapeutic exercises and would benefit from continued PT      Plan: Continue per plan of care  Progress treatment as tolerated         Precautions: chronicity of symptoms      Manuals 2022           IASTM  posterior cuff, R UT Posterior cuff,  Posterior Cuff         (-) pressure IASTM                                       Neuro Re-Ed             ER Iso HEP HEP           TB Row HEP GTB 20x  CC 20# 3x10         ER/IR  RTB 2x10 each, painful with second set of ER           Serratus Action             SL ER  1# 3x10  3# 3x10         SLR   2x10 bilaterally NV         Prone YTI    3x10 3" bilaterally         SL Hip Abd   2x10 bilaterally NV         Bridge   2x10 2x10         Ther Ex             Pulley  NV           PC Stretch HEP 30" 3x 30" 3x 30" 3x         AAROm Scpation HEP 20x   5" 20x         Sleeper Stretch  10" 10x  10" 10x         UT Stretch  30" 3x           It Band Stretch   30" 3x bilaterally 30" 3x bilat         Wall Slide   1x20 HEP         Leg Press    75# 3x10         LAQ   2x10 bilaterally 7# NV         Ther Activity                                       Gait Training Modalities

## 2022-06-30 ENCOUNTER — OFFICE VISIT (OUTPATIENT)
Dept: PHYSICAL THERAPY | Facility: MEDICAL CENTER | Age: 57
End: 2022-06-30
Payer: COMMERCIAL

## 2022-06-30 DIAGNOSIS — M25.511 CHRONIC RIGHT SHOULDER PAIN: Primary | ICD-10-CM

## 2022-06-30 DIAGNOSIS — M17.0 PRIMARY OSTEOARTHRITIS OF BOTH KNEES: ICD-10-CM

## 2022-06-30 DIAGNOSIS — G89.29 CHRONIC RIGHT SHOULDER PAIN: Primary | ICD-10-CM

## 2022-06-30 PROCEDURE — 97110 THERAPEUTIC EXERCISES: CPT

## 2022-06-30 PROCEDURE — 97140 MANUAL THERAPY 1/> REGIONS: CPT

## 2022-06-30 NOTE — PROGRESS NOTES
Daily Note     Today's date: 2022  Patient name: Tomasa Norman  : 1965  MRN: [id number]  Referring provider: Chris Lebron MD  Dx:   Encounter Diagnosis     ICD-10-CM    1  Chronic right shoulder pain  M25 511     G89 29    2  Primary osteoarthritis of both knees  M17 0        Start Time: 1450  Stop Time: 1545  Total time in clinic (min): 55 minutes    Subjective: Pt denies soreness or pain after previous visit  Objective: See treatment diary below      Assessment: Tolerated treatment well  Minor pain noted on lateral shoulder with side lying ER this session  Patient requires occasional cues for decreased upper trapezius compensation  Patient demonstrated fatigue post treatment, exhibited good technique with therapeutic exercises and would benefit from continued PT      Plan: Continue per plan of care  Progress treatment as tolerated         Precautions: chronicity of symptoms      Manuals 2022          IASTM  posterior cuff, R UT Posterior cuff,  Posterior Cuff posterior cuff        (-) pressure IASTM                                       Neuro Re-Ed             ER Iso HEP HEP           TB Row HEP GTB 20x  CC 20# 3x10 CC 30# 3x10        ER/IR  RTB 2x10 each, painful with second set of ER           Serratus Action             SL ER  1# 3x10  3# 3x10 3# 3x10        SLR   2x10 bilaterally NV         Prone YTI    3x10 3" bilaterally 3x10 3" bilat        SL Hip Abd   2x10 bilaterally NV         Bridge   2x10 2x10 3x10        Ther Ex             Pulley  NV           PC Stretch HEP 30" 3x 30" 3x 30" 3x 30" 3x        AAROm Scpation HEP 20x   5" 20x 5" 20x        Sleeper Stretch  10" 10x  10" 10x 10" 10x        UT Stretch  30" 3x           It Band Stretch   30" 3x bilaterally 30" 3x bilat 30" 3x bilat        Wall Slide   1x20 HEP         Leg Press    75# 3x10 75# 3x10        LAQ   2x10 bilaterally 7# NV 2x10 bilaterally 7#        Ther Activity Gait Training                                       Modalities

## 2022-07-05 ENCOUNTER — APPOINTMENT (OUTPATIENT)
Dept: PHYSICAL THERAPY | Facility: MEDICAL CENTER | Age: 57
End: 2022-07-05
Payer: COMMERCIAL

## 2022-07-07 ENCOUNTER — APPOINTMENT (OUTPATIENT)
Dept: PHYSICAL THERAPY | Facility: MEDICAL CENTER | Age: 57
End: 2022-07-07
Payer: COMMERCIAL

## 2022-07-07 DIAGNOSIS — N95.1 VAGINAL DRYNESS, MENOPAUSAL: ICD-10-CM

## 2022-07-11 ENCOUNTER — OFFICE VISIT (OUTPATIENT)
Dept: PHYSICAL THERAPY | Facility: MEDICAL CENTER | Age: 57
End: 2022-07-11
Payer: COMMERCIAL

## 2022-07-11 DIAGNOSIS — M17.0 PRIMARY OSTEOARTHRITIS OF BOTH KNEES: ICD-10-CM

## 2022-07-11 DIAGNOSIS — M25.511 CHRONIC RIGHT SHOULDER PAIN: Primary | ICD-10-CM

## 2022-07-11 DIAGNOSIS — G89.29 CHRONIC RIGHT SHOULDER PAIN: Primary | ICD-10-CM

## 2022-07-11 PROCEDURE — 97140 MANUAL THERAPY 1/> REGIONS: CPT

## 2022-07-11 PROCEDURE — 97110 THERAPEUTIC EXERCISES: CPT

## 2022-07-11 PROCEDURE — 97112 NEUROMUSCULAR REEDUCATION: CPT

## 2022-07-11 NOTE — PROGRESS NOTES
Daily Note     Today's date: 2022  Patient name: Trip Luna  : 1965  MRN: [id number]  Referring provider: Avila Benavidez MD  Dx:   Encounter Diagnosis     ICD-10-CM    1  Chronic right shoulder pain  M25 511     G89 29    2  Primary osteoarthritis of both knees  M17 0                   Subjective: Pt notes continued improvement in R shoulder pain  She states she went on a long hike this weekend and states moderate soreness in her knees  She experienced occasional pain while walking when she was over striding  Objective: See treatment diary below      Assessment: Tolerated treatment well  Decreased restrictions with IASTM this session  Pt with pain with 6" step down this session, tolerated 4" without pain  Patient demonstrated fatigue post treatment, exhibited good technique with therapeutic exercises and would benefit from continued PT      Plan: Continue per plan of care  Progress treatment as tolerated         Precautions: chronicity of symptoms      Manuals 2022         IASTM  posterior cuff, R UT Posterior cuff,  Posterior Cuff posterior cuff posterior cuff       (-) pressure IASTM                                       Neuro Re-Ed             ER Iso HEP HEP           TB Row HEP GTB 20x  CC 20# 3x10 CC 30# 3x10 CC 30# 3x10       ER/IR  RTB 2x10 each, painful with second set of ER           Serratus Action             SL ER  1# 3x10  3# 3x10 3# 3x10 NV       SLR   2x10 bilaterally NV         Prone YTI    3x10 3" bilaterally 3x10 3" bilat 3x10 3"       SL Hip Abd   2x10 bilaterally NV         Bridge   2x10 2x10 3x10 NV       Ther Ex             Pulley  NV           PC Stretch HEP 30" 3x 30" 3x 30" 3x 30" 3x 30" 3x       AAROm Scpation HEP 20x   5" 20x 5" 20x 5" 20x       Sleeper Stretch  10" 10x  10" 10x 10" 10x HEP       UT Stretch  30" 3x           It Band Stretch   30" 3x bilaterally 30" 3x bilat 30" 3x bilat 30" 3x BL Wall Slide   1x20 HEP         Leg Press    75# 3x10 75# 3x10 75# 3x10       Step Down      4" 2x10 BL       LAQ   2x10 bilaterally 7# NV 2x10 bilaterally 7# 2x10 BL 7#       Ther Activity                                       Gait Training                                       Modalities

## 2022-07-12 RX ORDER — ESTRADIOL 0.1 MG/G
CREAM VAGINAL
Qty: 42.5 G | Refills: 1 | OUTPATIENT
Start: 2022-07-12

## 2022-07-12 RX ORDER — ESTRADIOL 0.1 MG/G
1 CREAM VAGINAL 2 TIMES WEEKLY
Qty: 42.5 G | Refills: 0 | Status: SHIPPED | OUTPATIENT
Start: 2022-07-14 | End: 2022-08-04 | Stop reason: SDUPTHER

## 2022-07-14 ENCOUNTER — OFFICE VISIT (OUTPATIENT)
Dept: PHYSICAL THERAPY | Facility: MEDICAL CENTER | Age: 57
End: 2022-07-14
Payer: COMMERCIAL

## 2022-07-14 DIAGNOSIS — G89.29 CHRONIC RIGHT SHOULDER PAIN: Primary | ICD-10-CM

## 2022-07-14 DIAGNOSIS — M25.511 CHRONIC RIGHT SHOULDER PAIN: Primary | ICD-10-CM

## 2022-07-14 DIAGNOSIS — M17.0 PRIMARY OSTEOARTHRITIS OF BOTH KNEES: ICD-10-CM

## 2022-07-14 PROCEDURE — 97110 THERAPEUTIC EXERCISES: CPT

## 2022-07-14 NOTE — PROGRESS NOTES
Daily Note and Discharge     Today's date: 2022  Patient name: Vania Jara  : 1965  MRN: [id number]  Referring provider: Maria A Us MD  Dx:   Encounter Diagnosis     ICD-10-CM    1  Chronic right shoulder pain  M25 511     G89 29    2  Primary osteoarthritis of both knees  M17 0        Start Time: 1540  Stop Time: 1615  Total time in clinic (min): 35 minutes    Subjective: Pt states feeling well  She states overall since starting skilled PT services she feels about 80% improved  She no longer has pain in her shoulder  She states continued discomfort in her knees after activity, but she is happy with the strength improvement she has made  Objective: See treatment diary below  R Knee Flex: 4+/5  R Knee Ext: 4+/5  L Knee Flex: 4+/5  L Knee Ext: 4+/5    R Middle Trap: 4/5  R Low Trap: 4/5  L Middle Trap: 4/5  L Low Trap: 4/5    Assessment: Tolerated treatment well  Patient demonstrated fatigue post treatment and exhibited good technique with therapeutic exercises At this time patient has progressed toward PT and pt stated goals  Patient is independent with HEP and is resuming activity at [facility]  Patient is appropriate for discharge to home with HEP  Goals  STG (2-6 weeks)    1  Patient will be able to perform shoulder flexion/abduction  of atleast 3/5 MMT to allpow for proper UE positioning at desk to fulfill desk work requirements MET    2  Pt will improve MMT by 1/2 muscle grade in all deficient planes MET    3  Pt will report a 2-3 point decrease on NPRS for improved tolerance to ADLs, recreational activities and work duties  MET    4  Pt will improve LE MMT by 1/2 muscle grade  MET      LTG   (by time of discharge)     1  Patient will be independent with HEP for continued progress  MET    2  Patient will attain FOTO score of anticipated or greater at time of discharge  MET    3  Patient will attain symmetrical IR to perform ADLs of grooming, dressing and washing       4  Patient will attain 150-180 degrees of GH flexion and abduction to place objects overhead  5   Patient will demonstrate 5/5 MMT in all deficient planes  Progressing    6  Pt will ascend and descend stairs with 0/10 pain   MET    Plan: Discharge at this time     Precautions: chronicity of symptoms      Manuals 6/20/2022 6/20/2022 6/22/2022 6/28/2022 6/30/2022 7/11/2022 7/14/2022        IASTM  posterior cuff, R UT Posterior cuff,  Posterior Cuff posterior cuff posterior cuff posterior cuff      (-) pressure IASTM                                       Neuro Re-Ed             ER Iso HEP HEP           TB Row HEP GTB 20x  CC 20# 3x10 CC 30# 3x10 CC 30# 3x10 NP      ER/IR  RTB 2x10 each, painful with second set of ER           Serratus Action             SL ER  1# 3x10  3# 3x10 3# 3x10 NV NP      SLR   2x10 bilaterally NV         Prone YTI    3x10 3" bilaterally 3x10 3" bilat 3x10 3" 3x10 3"      SL Hip Abd   2x10 bilaterally NV         Bridge   2x10 2x10 3x10 NV 3x10      Ther Ex             Pulley  NV           PC Stretch HEP 30" 3x 30" 3x 30" 3x 30" 3x 30" 3x 30" 3x      AAROm Scpation HEP 20x   5" 20x 5" 20x 5" 20x 5" 20x      Sleeper Stretch  10" 10x  10" 10x 10" 10x HEP       UT Stretch  30" 3x           It Band Stretch   30" 3x bilaterally 30" 3x bilat 30" 3x bilat 30" 3x BL 30" 3x BL      Wall Slide   1x20 HEP         Leg Press    75# 3x10 75# 3x10 75# 3x10 75# 3x10      Step Down      4" 2x10 BL NP      RF Stretch       30" 3x BL                   LAQ   2x10 bilaterally 7# NV 2x10 bilaterally 7# 2x10 BL 7# NP      Ther Activity                                       Gait Training                                       Modalities

## 2022-08-04 ENCOUNTER — ANNUAL EXAM (OUTPATIENT)
Dept: OBGYN CLINIC | Facility: MEDICAL CENTER | Age: 57
End: 2022-08-04
Payer: COMMERCIAL

## 2022-08-04 VITALS
DIASTOLIC BLOOD PRESSURE: 72 MMHG | HEIGHT: 61 IN | BODY MASS INDEX: 20.32 KG/M2 | WEIGHT: 107.6 LBS | SYSTOLIC BLOOD PRESSURE: 100 MMHG

## 2022-08-04 DIAGNOSIS — Z01.419 ROUTINE GYNECOLOGICAL EXAMINATION: Primary | ICD-10-CM

## 2022-08-04 DIAGNOSIS — N95.1 VAGINAL DRYNESS, MENOPAUSAL: ICD-10-CM

## 2022-08-04 PROCEDURE — S0612 ANNUAL GYNECOLOGICAL EXAMINA: HCPCS | Performed by: PHYSICIAN ASSISTANT

## 2022-08-04 RX ORDER — ESTRADIOL 0.1 MG/G
1 CREAM VAGINAL 2 TIMES WEEKLY
Qty: 42.5 G | Refills: 2 | Status: SHIPPED | OUTPATIENT
Start: 2022-08-04

## 2022-08-04 NOTE — PROGRESS NOTES
Assessment   60 y o  postmenopausal female presenting for annual exam      Plan   Diagnoses and all orders for this visit:    Routine gynecological examination  Normal findings on routine exam    considerations reviewed  Aware of sx to report  Breast awareness/SBE encouraged  Encouraged 150 min of exercise per week  Reviewed balanced diet  Vitamin D and Calcium supplement recommended  Vaginal dryness, menopausal  -     estradiol (ESTRACE) 0 1 mg/g vaginal cream; Insert 1 g into the vagina 2 (two) times a week        Pap due   Mammo slip given   Colonoscopy due       RTO one year for yearly exam or sooner as needed  __________________________________________________________________      Subjective     Mike Shankar is a 60 y o  postmenopausal female presenting for annual exam  She is without complaint and does not want STD testing today  SCREENING  Last Pap: 2021 NILM/Neg  Last Mammo: 2021 BIRADS 2 - Benign  Last Colonoscopy: 10/09/2020 - 10 year recall   Last DEXA: N/A    GYN  Denies postmenopausal vaginal bleeding, dryness, vaginal discharge, itching, odor, pelvic pain and vulvar/vaginal symptoms    Menarche at age approx 13  Menopause at age 44  Menopausal symptoms vaginal dryness relieved with premarin cream - otherwise no complaints    Sexually active: Yes - single partner - , monogamous  Sexual dysfunction: vaginal dryness was causing pain / PCB, now relieved with premarin  Hx STI: denies     Hx Abnormal pap: denies  We reviewed ASCCP guidelines for Pap testing today  OB          Complaints: denies  Denies leakage/difficulty urinating, dysuria, hematuria, and urinary frequency/urgency      BREAST  Complaints: denies  Denies: breast lump, breast tenderness, dryness, nipple discharge, pruritus, skin color change, and skin lesion(s)  Personal hx: implants present b/l  GENERAL  PMH reviewed/updated and is as below     Patient does follow with a PCP     Pertinent Family Hx:   Family hx of breast cancer: no  Family hx of ovarian cancer: no  Family hx of colon cancer: no      Past Medical History:   Diagnosis Date    Breast cyst     Cholelithiasis     Cystocele, midline     Frequent urination     H/O bilateral breast implants     Headache     Hyperlipidemia     Lyme disease     2016    Mild acid reflux     PONV (postoperative nausea and vomiting)     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     Spider veins of both lower extremities     Umbilical hernia     Umbilical hernia without obstruction and without gangrene 8/11/2020    Wears glasses        Past Surgical History:   Procedure Laterality Date    AUGMENTATION MAMMAPLASTY Bilateral 2014    retro pec saline    COLONOSCOPY  2014    EGD      SC CMBND ANTERPOST COLPORRAPHY W/CYSTO N/A 12/22/2020    Procedure: ANT POSTCOLPORRHAPHY;  Surgeon: Jacey Gonzalez MD;  [facility];  Service: UroGynecology           SC CYSTOURETHROSCOPY N/A 12/22/2020    Procedure: Prema Muse;  Surgeon: Jacey Gonzalez MD;  [facility];  Service: UroGynecology           SC LAP,CHOLECYSTECTOMY N/A 12/22/2020    Procedure: LAP CARLOTTA;  Surgeon: Ivonne Schreiber MD;  [facility];  Service: General    SC REPAIR UMBILICAL UFRT,1+P/D,LFNSS N/A 12/22/2020    Procedure: REPAIR HERNIA UMBILICAL;  Surgeon: Ivonne Schreiber MD;  [facility];  Service: General    SC SLING OPER STRES INCONTINENCE N/A 12/22/2020    Procedure: PUBOVAGINAL SLING;  Surgeon: Jacey Gonzalez MD;  [facility];  Service: UroGynecology                Current Outpatient Medications:     Ascorbic Acid (VITAMIN C PO), Take by mouth daily , Disp: , Rfl:     Bilberry, Vaccinium myrtillus, (BILBERRY EXTRACT PO), Take by mouth daily , Disp: , Rfl:     BIOTIN PO, Take by mouth daily , Disp: , Rfl:     Calcium Carbonate-Vitamin D (CALCIUM-D PO), Take by mouth daily , Disp: , Rfl:    COLLAGEN PO, Take by mouth daily , Disp: , Rfl:     Cyanocobalamin (VITAMIN B-12 PO), Take by mouth daily , Disp: , Rfl:     estradiol (ESTRACE) 0 1 mg/g vaginal cream, Insert 1 g into the vagina 2 (two) times a week, Disp: 42 5 g, Rfl: 2    GLUCOSAMINE-CHONDROITIN PO, Take by mouth daily , Disp: , Rfl:     Misc Natural Products (TART CHERRY ADVANCED PO), Take by mouth daily , Disp: , Rfl:     Misc Natural Products (Turmeric Curcumin) CAPS, Take by mouth daily , Disp: , Rfl:     Multiple Vitamin (DAILY VITAMIN) tablet, Take 1 tablet by mouth daily , Disp: , Rfl:     Multiple Vitamins-Minerals (HAIR VITAMINS PO), Take by mouth daily , Disp: , Rfl:     NON FORMULARY, luti-gold OTC daily/ viviscalpro OTC daily, Disp: , Rfl:     oxybutynin (DITROPAN XL) 15 MG 24 hr tablet, Take 1 tablet (15 mg total) by mouth daily at bedtime, Disp: 90 tablet, Rfl: 1    Pomegranate, Punica granatum, (POMEGRANATE PO), Take by mouth daily , Disp: , Rfl:     Probiotic Product (PROBIOTIC DAILY PO), Take by mouth daily , Disp: , Rfl:     Pyridoxine HCl (VITAMIN B6 PO), Take by mouth daily , Disp: , Rfl:     Red Yeast Rice Extract (RED YEAST RICE PO), Take by mouth daily , Disp: , Rfl:     VITAMIN D PO, Take by mouth daily , Disp: , Rfl:     PAPAYA PO, Take by mouth daily  (Patient not taking: Reported on 8/4/2022), Disp: , Rfl:     triamcinolone (KENALOG) 0 5 % cream, Apply topically 3 (three) times a day (Patient not taking: Reported on 8/4/2022), Disp: 30 g, Rfl: 2    Current Facility-Administered Medications:     ondansetron (ZOFRAN-ODT) dispersible tablet 4 mg, 4 mg, Oral, Q6H PRN, Jonnie Morel PA-C, 4 mg at 07/28/21 1656    No Known Allergies    Social History     Socioeconomic History    Marital status: /Civil Union     Spouse name: Not on file    Number of children: 1    Years of education: Not on file    Highest education level: Not on file   Occupational History    Occupation: Teacher     Comment:    Tobacco Use    Smoking status: Never Smoker    Smokeless tobacco: Never Used   Vaping Use    Vaping Use: Never used   Substance and Sexual Activity    Alcohol use: No    Drug use: No    Sexual activity: Yes     Partners: Male     Birth control/protection: Post-menopausal     Comment:    Other Topics Concern    Not on file   Social History Narrative    · Most recent tobacco use screenin2019      · Do you currently or have you served in the [employer]:   No      · Were you activated, into active duty, as a member of the [employer] or as a Reservist:   No      Social Determinants of Health     Financial Resource Strain: Not on file   Food Insecurity: Not on file   Transportation Needs: Not on file   Physical Activity: Not on file   Stress: Not on file   Social Connections: Not on file   Intimate Partner Violence: Not on file   Housing Stability: Not on file         Review of Systems     ROS:  Constitutional: Negative for appetite change, fatigue and unexpected weight change  Respiratory: Negative  Cardiovascular: Negative  Gastrointestinal: Negative for abdominal pain and change in bowel habits/constipation/diarrhea  Breasts:  Negative other than as noted above  Genitourinary: Negative other than as noted above  Psychiatric: Negative for mood difficulties  Objective      /72   Ht 5' 1" (1 549 m)   Wt 48 8 kg (107 lb 9 6 oz)   BMI 20 33 kg/m²     Physical Examination:  Patient appears well and is not in distress  Neck is supple without masses  Breasts are symmetrical without mass, tenderness, nipple discharge, skin changes or adenopathy  Abdomen is soft and nontender without masses  External genitals are normal without lesions or rashes  Mild atrophic changes noted  Urethral meatus and urethra are normal  Bladder is normal to palpation  Vagina is normal without discharge or bleeding  Mild weakening of anterior and posterior walls  Cervix is normal without discharge or lesion  Uterus is normal, mobile, nontender without palpable mass  Adnexa are normal, nontender, without palpable mass

## 2022-08-04 NOTE — PROGRESS NOTES
Patient is here for yearly exam   She denies any complaints  : yes, no abnormal  She is sexually active  She does not desire STD testing  Last pap: 7/26/21  Pap: neg/HPV: neg  Last mammogram: 8/3/21  Mammogram slip given: no- scheduled 11/25/22  Colonoscopy: 10/9/2020   Referral given: no  Family history of breast, uterine or colon cancer: no

## 2022-11-25 ENCOUNTER — HOSPITAL ENCOUNTER (OUTPATIENT)
Dept: RADIOLOGY | Age: 57
Discharge: HOME | End: 2022-11-25

## 2022-11-25 ENCOUNTER — APPOINTMENT (OUTPATIENT)
Dept: LAB | Facility: MEDICAL CENTER | Age: 57
End: 2022-11-25

## 2022-11-25 VITALS — HEIGHT: 61 IN | BODY MASS INDEX: 20.2 KG/M2 | WEIGHT: 107 LBS
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
## 2022-11-25 DIAGNOSIS — Z78.0 MENOPAUSE: ICD-10-CM

## 2022-11-25 DIAGNOSIS — R53.83 OTHER FATIGUE: ICD-10-CM

## 2022-11-25 DIAGNOSIS — Z12.31 SCREENING MAMMOGRAM FOR HIGH-RISK PATIENT: ICD-10-CM

## 2022-11-25 DIAGNOSIS — Z13.220 SCREENING CHOLESTEROL LEVEL: ICD-10-CM

## 2022-11-25 LAB
ALBUMIN SERPL BCP-MCNC: 3.5 G/DL (ref 3.5–5)
ALP SERPL-CCNC: 86 U/L (ref 46–116)
ALT SERPL W P-5'-P-CCNC: 38 U/L (ref 12–78)
ANION GAP SERPL CALCULATED.3IONS-SCNC: 3 MMOL/L (ref 4–13)
AST SERPL W P-5'-P-CCNC: 33 U/L (ref 5–45)
BASOPHILS # BLD AUTO: 0.03 THOUSANDS/ÂΜL (ref 0–0.1)
BASOPHILS NFR BLD AUTO: 1 % (ref 0–1)
BILIRUB SERPL-MCNC: 1.03 MG/DL (ref 0.2–1)
BUN SERPL-MCNC: 19 MG/DL (ref 5–25)
CALCIUM SERPL-MCNC: 9.4 MG/DL (ref 8.3–10.1)
CHLORIDE SERPL-SCNC: 108 MMOL/L (ref 96–108)
CHOLEST SERPL-MCNC: 202 MG/DL
CO2 SERPL-SCNC: 29 MMOL/L (ref 21–32)
CREAT SERPL-MCNC: 0.7 MG/DL (ref 0.6–1.3)
EOSINOPHIL # BLD AUTO: 0.08 THOUSAND/ÂΜL (ref 0–0.61)
EOSINOPHIL NFR BLD AUTO: 3 % (ref 0–6)
ERYTHROCYTE [DISTWIDTH] IN BLOOD BY AUTOMATED COUNT: 12.8 % (ref 11.6–15.1)
GFR SERPL CREATININE-BSD FRML MDRD: 95 ML/MIN/1.73SQ M
GLUCOSE P FAST SERPL-MCNC: 86 MG/DL (ref 65–99)
HCT VFR BLD AUTO: 39.2 % (ref 34.8–46.1)
HDLC SERPL-MCNC: 90 MG/DL
HGB BLD-MCNC: 13 G/DL (ref 11.5–15.4)
IMM GRANULOCYTES # BLD AUTO: 0.01 THOUSAND/UL (ref 0–0.2)
IMM GRANULOCYTES NFR BLD AUTO: 0 % (ref 0–2)
LDLC SERPL CALC-MCNC: 105 MG/DL (ref 0–100)
LYMPHOCYTES # BLD AUTO: 1.32 THOUSANDS/ÂΜL (ref 0.6–4.47)
LYMPHOCYTES NFR BLD AUTO: 41 % (ref 14–44)
MAGNESIUM SERPL-MCNC: 2.2 MG/DL (ref 1.6–2.6)
MCH RBC QN AUTO: 32.2 PG (ref 26.8–34.3)
MCHC RBC AUTO-ENTMCNC: 33.2 G/DL (ref 31.4–37.4)
MCV RBC AUTO: 97 FL (ref 82–98)
MONOCYTES # BLD AUTO: 0.34 THOUSAND/ÂΜL (ref 0.17–1.22)
MONOCYTES NFR BLD AUTO: 11 % (ref 4–12)
NEUTROPHILS # BLD AUTO: 1.41 THOUSANDS/ÂΜL (ref 1.85–7.62)
NEUTS SEG NFR BLD AUTO: 44 % (ref 43–75)
NRBC BLD AUTO-RTO: 0 /100 WBCS
PLATELET # BLD AUTO: 243 THOUSANDS/UL (ref 149–390)
PMV BLD AUTO: 10.5 FL (ref 8.9–12.7)
POTASSIUM SERPL-SCNC: 3.9 MMOL/L (ref 3.5–5.3)
PROT SERPL-MCNC: 7.2 G/DL (ref 6.4–8.4)
RBC # BLD AUTO: 4.04 MILLION/UL (ref 3.81–5.12)
SODIUM SERPL-SCNC: 140 MMOL/L (ref 135–147)
TRIGL SERPL-MCNC: 35 MG/DL
URATE SERPL-MCNC: 3.6 MG/DL (ref 2–7.5)
WBC # BLD AUTO: 3.19 THOUSAND/UL (ref 4.31–10.16)

## 2022-11-26 LAB
B BURGDOR IGG+IGM SER-ACNC: 1.2 AI
BACTERIA UR QL AUTO: ABNORMAL /HPF
BILIRUB UR QL STRIP: NEGATIVE
CLARITY UR: CLEAR
COLOR UR: ABNORMAL
GLUCOSE UR STRIP-MCNC: NEGATIVE MG/DL
HGB UR QL STRIP.AUTO: NEGATIVE
KETONES UR STRIP-MCNC: NEGATIVE MG/DL
LEUKOCYTE ESTERASE UR QL STRIP: NEGATIVE
NITRITE UR QL STRIP: NEGATIVE
NON-SQ EPI CELLS URNS QL MICRO: ABNORMAL /HPF
PH UR STRIP.AUTO: 7 [PH]
PROT UR STRIP-MCNC: NEGATIVE MG/DL
RBC #/AREA URNS AUTO: ABNORMAL /HPF
SP GR UR STRIP.AUTO: 1.02 (ref 1–1.03)
UROBILINOGEN UR STRIP-ACNC: <2 MG/DL
WBC #/AREA URNS AUTO: ABNORMAL /HPF

## 2022-11-29 LAB
B BURGDOR IGG PATRN SER IB-IMP: NEGATIVE
B BURGDOR IGM PATRN SER IB-IMP: NEGATIVE
B BURGDOR18KD IGG SER QL IB: ABNORMAL
B BURGDOR23KD IGG SER QL IB: ABNORMAL
B BURGDOR23KD IGM SER QL IB: PRESENT
B BURGDOR28KD IGG SER QL IB: ABNORMAL
B BURGDOR30KD IGG SER QL IB: ABNORMAL
B BURGDOR39KD IGG SER QL IB: ABNORMAL
B BURGDOR39KD IGM SER QL IB: ABNORMAL
B BURGDOR41KD IGG SER QL IB: PRESENT
B BURGDOR41KD IGM SER QL IB: ABNORMAL
B BURGDOR45KD IGG SER QL IB: ABNORMAL
B BURGDOR58KD IGG SER QL IB: PRESENT
B BURGDOR66KD IGG SER QL IB: ABNORMAL
B BURGDOR93KD IGG SER QL IB: PRESENT

## 2022-12-27 NOTE — PROGRESS NOTES
Name: Francois Blakely      : 1965      MRN: [id number]  Encounter Provider: Lia Villa MD  Encounter Date: 2022   Encounter department: [address] [facility]     1  Well adult exam    2  Bed wetting  Assessment & Plan:  Patient is stable  and will continue present plan of care and reassess at next routine visit  All questions about this problem from patient were answered today  3  Pure hypercholesterolemia  -     Comprehensive metabolic panel; Future  -     Lipid Panel with Direct LDL reflex; Future    4  Age-related osteoporosis without current pathological fracture           Subjective     Is a 68-year-old female today for yearly evaluation as well as her physical   Patient also for follow-up on history of some bedwetting and was diagnosed recently with osteoporosis  Patient is a great candidate for Prolia and we will see about getting her started on that  Patient her lab work reviewed and is doing very well  Patient does not need prescription refills  We will see about getting her scheduled for Prolia and we will see about seeing her back in 6 months after she gets that and injection  Review of Systems   Constitutional: Negative for activity change, appetite change, fatigue and fever  HENT: Negative for congestion, ear pain, postnasal drip, rhinorrhea, sinus pressure, sinus pain, sneezing and sore throat  Eyes: Negative for pain and redness  Respiratory: Negative for apnea, cough, chest tightness, shortness of breath and wheezing  Cardiovascular: Negative for chest pain, palpitations and leg swelling  Gastrointestinal: Negative for abdominal pain, constipation, diarrhea, nausea and vomiting  Endocrine: Negative for cold intolerance and heat intolerance  Genitourinary: Negative for difficulty urinating, dysuria, frequency, hematuria and urgency  Musculoskeletal: Negative for arthralgias, back pain, gait problem and myalgias  Skin: Negative for rash  Neurological: Negative for dizziness, speech difficulty, weakness, numbness and headaches  Hematological: Does not bruise/bleed easily  Psychiatric/Behavioral: Negative for agitation, confusion and hallucinations         Past Medical History:   Diagnosis Date   • Breast cyst    • Cholelithiasis    • Cystocele, midline    • Frequent urination    • H/O bilateral breast implants    • Headache    • Hyperlipidemia    • Lyme disease     2016   • Mild acid reflux    • PONV (postoperative nausea and vomiting)    • 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    • Spider veins of both lower extremities    • Umbilical hernia    • Umbilical hernia without obstruction and without gangrene 8/11/2020   • Wears glasses      Past Surgical History:   Procedure Laterality Date   • AUGMENTATION MAMMAPLASTY Bilateral 2014    retro pec saline   • COLONOSCOPY  2014   • EGD     • NV CMBND ANTERPOST COLPORRAPHY W/CYSTO N/A 12/22/2020    Procedure: ANT POSTCOLPORRHAPHY;  Surgeon: Tsering Watkins MD;  [facility];  Service: UroGynecology          • NV CYSTOURETHROSCOPY N/A 12/22/2020    Procedure: Shelby Camera;  Surgeon: Tsering Watkins MD;  [facility];  Service: UroGynecology          • NV LAPAROSCOPY SURG CHOLECYSTECTOMY N/A 12/22/2020    Procedure: LAP CARLOTTA;  Surgeon: Garry Calloway MD;  [facility];  Service: General   • NV RPR UMBILICAL HRNA 5 YRS/> REDUCIBLE N/A 12/22/2020    Procedure: REPAIR HERNIA UMBILICAL;  Surgeon: Garry Calloway MD;  [facility];  Service: General   • NV SLING OPERATION STRESS INCONTINENCE N/A 12/22/2020    Procedure: PUBOVAGINAL SLING;  Surgeon: Tsering Watkins MD;  [facility];  Service: UroGynecology            Family History   Problem Relation Age of Onset   • Hypertension Mother    • Diabetes Father    • No Known Problems Sister    • No Known Problems Sister    • No Known Problems Sister    • No Known Problems Sister    • Other Son         lyme disease     Social History     Socioeconomic History   • Marital status: /Civil Union     Spouse name: None   • Number of children: 1   • Years of education: None   • Highest education level: None   Occupational History   • Occupation: Teacher     Comment:    Tobacco Use   • Smoking status: Never   • Smokeless tobacco: Never   Vaping Use   • Vaping Use: Never used   Substance and Sexual Activity   • Alcohol use: No   • Drug use: No   • Sexual activity: Yes     Partners: Male     Birth control/protection: Post-menopausal     Comment:    Other Topics Concern   • None   Social History Narrative    · Most recent tobacco use screenin2019      · Do you currently or have you served in [employer]:   No      · Were you activated, into active duty, as a member of the [employer] or as a Reservist:   No      Social Determinants of Health     Financial Resource Strain: Not on file   Food Insecurity: Not on file   Transportation Needs: Not on file   Physical Activity: Not on file   Stress: Not on file   Social Connections: Not on file   Intimate Partner Violence: Not on file   Housing Stability: Not on file     Current Outpatient Medications on File Prior to Visit   Medication Sig   • Ascorbic Acid (VITAMIN C PO) Take by mouth daily    • Bilberry, Vaccinium myrtillus, (BILBERRY EXTRACT PO) Take by mouth daily    • BIOTIN PO Take by mouth daily    • Calcium Carbonate-Vitamin D (CALCIUM-D PO) Take by mouth daily    • COLLAGEN PO Take by mouth daily    • Cyanocobalamin (VITAMIN B-12 PO) Take by mouth daily    • estradiol (ESTRACE) 0 1 mg/g vaginal cream Insert 1 g into the vagina 2 (two) times a week   • GLUCOSAMINE-CHONDROITIN PO Take by mouth daily    • Misc Natural Products (TART CHERRY ADVANCED PO) Take by mouth daily    • Misc Natural Products (Turmeric Curcumin) CAPS Take by mouth daily    • Multiple Vitamin (DAILY VITAMIN) tablet Take 1 tablet by mouth daily    • Multiple Vitamins-Minerals (HAIR VITAMINS PO) Take by mouth daily    • NON FORMULARY luti-gold OTC daily/ viviscalpro OTC daily   • oxybutynin (DITROPAN XL) 15 MG 24 hr tablet Take 1 tablet (15 mg total) by mouth daily at bedtime   • Pomegranate, Punica granatum, (POMEGRANATE PO) Take by mouth daily    • Probiotic Product (PROBIOTIC DAILY PO) Take by mouth daily    • Pyridoxine HCl (VITAMIN B6 PO) Take by mouth daily    • Red Yeast Rice Extract (RED YEAST RICE PO) Take by mouth daily    • VITAMIN D PO Take by mouth daily    • triamcinolone (KENALOG) 0 5 % cream Apply topically 3 (three) times a day (Patient not taking: Reported on 12/28/2022)   • [DISCONTINUED] PAPAYA PO Take by mouth daily  (Patient not taking: Reported on 8/4/2022)     No Known Allergies  Immunization History   Administered Date(s) Administered   • COVID-19 PFIZER VACCINE 0 3 ML IM 03/31/2021, 04/21/2021   • H1N1, All Formulations 01/05/2010   • Influenza, injectable, quadrivalent, preservative free 0 5 mL 10/01/2020   • Zoster Vaccine Recombinant 07/29/2020       Objective     /70 (BP Location: Left arm, Patient Position: Sitting, Cuff Size: Standard)   Pulse 64   Temp 97 5 °F (36 4 °C) (Temporal)   Resp 14   Ht 5' 1" (1 549 m)   Wt 49 9 kg (110 lb)   SpO2 98%   BMI 20 78 kg/m²     Physical Exam  Vitals and nursing note reviewed  Constitutional:       Appearance: She is well-developed  HENT:      Head: Normocephalic and atraumatic  Nose: Nose normal    Eyes:      General: No scleral icterus  Conjunctiva/sclera: Conjunctivae normal       Pupils: Pupils are equal, round, and reactive to light  Neck:      Thyroid: No thyromegaly  Cardiovascular:      Rate and Rhythm: Normal rate and regular rhythm  Pulmonary:      Effort: Pulmonary effort is normal       Breath sounds: Normal breath sounds  No wheezing  Abdominal:      General: Bowel sounds are normal  There is no distension  Palpations: Abdomen is soft  Tenderness: There is no abdominal tenderness  There is no guarding or rebound  Musculoskeletal:         General: No tenderness or deformity  Normal range of motion  Cervical back: Normal range of motion and neck supple  Skin:     General: Skin is warm and dry  Findings: No erythema or rash  Neurological:      Mental Status: She is alert and oriented to person, place, and time  Sensory: No sensory deficit  Psychiatric:         Mood and Affect: Mood normal          Behavior: Behavior normal          Thought Content:  Thought content normal          Judgment: Judgment normal        Erika Muhammad MD

## 2022-12-28 ENCOUNTER — OFFICE VISIT (OUTPATIENT)
Dept: FAMILY MEDICINE CLINIC | Facility: CLINIC | Age: 57
End: 2022-12-28

## 2022-12-28 VITALS
HEART RATE: 64 BPM | WEIGHT: 110 LBS | OXYGEN SATURATION: 98 % | TEMPERATURE: 97.5 F | RESPIRATION RATE: 14 BRPM | BODY MASS INDEX: 20.77 KG/M2 | DIASTOLIC BLOOD PRESSURE: 70 MMHG | HEIGHT: 61 IN | SYSTOLIC BLOOD PRESSURE: 106 MMHG

## 2022-12-28 DIAGNOSIS — N32.81 OAB (OVERACTIVE BLADDER): ICD-10-CM

## 2022-12-28 DIAGNOSIS — N39.44 BED WETTING: ICD-10-CM

## 2022-12-28 DIAGNOSIS — Z00.00 WELL ADULT EXAM: Primary | ICD-10-CM

## 2022-12-28 DIAGNOSIS — E78.00 PURE HYPERCHOLESTEROLEMIA: ICD-10-CM

## 2022-12-28 DIAGNOSIS — M81.0 AGE-RELATED OSTEOPOROSIS WITHOUT CURRENT PATHOLOGICAL FRACTURE: ICD-10-CM

## 2022-12-28 RX ORDER — OXYBUTYNIN CHLORIDE 15 MG/1
15 TABLET, EXTENDED RELEASE ORAL
Qty: 90 TABLET | Refills: 1 | Status: SHIPPED | OUTPATIENT
Start: 2022-12-28

## 2023-03-29 ENCOUNTER — CLINICAL SUPPORT (OUTPATIENT)
Dept: FAMILY MEDICINE CLINIC | Facility: CLINIC | Age: 58
End: 2023-03-29

## 2023-03-29 DIAGNOSIS — M81.0 AGE-RELATED OSTEOPOROSIS WITHOUT CURRENT PATHOLOGICAL FRACTURE: Primary | ICD-10-CM

## 2023-05-02 ENCOUNTER — RA CDI HCC (OUTPATIENT)
Dept: OTHER | Facility: HOSPITAL | Age: 58
End: 2023-05-02

## 2023-05-02 NOTE — PROGRESS NOTES
[facility]  coding opportunities       Chart reviewed, no opportunity found: CHART REVIEWED, NO OPPORTUNITY FOUND        Patients Insurance        Commercial Insurance: [employer]

## 2023-05-03 ENCOUNTER — TELEPHONE (OUTPATIENT)
Dept: GASTROENTEROLOGY | Facility: CLINIC | Age: 58
End: 2023-05-03

## 2023-05-03 NOTE — TELEPHONE ENCOUNTER
Options - prior response to Advair and current description are consistent with reactive airways.   Singulair is a mild background controller but apparently is not sufficient to keep irritation down to a dull roar.    Last Chest xray was 2016 at [facility] -> would recheck just to be cautious as he has done well for years on the breathing.   Would do this regardless of choices below.    Could restart Advair or comparable combination based on formulary preferences to keep cost down -> would expect improvement in triggering within a couple of weeks.  If not, would look at pulmonology consultation.     Alternatively, if their peace of mind is better served by seeing pulmonology now, we can refer now vs later -> ?Dr Bryan?   lmom confirming pt's egd scheduled on 5/9/23 at [facility] with Dr Ashleigh Beltrán  Informed [facility] would be calling 1-2 days prior with the arrival time  Informed would need to be npo after midnight and would need  a  the day of the procedure due to being under sedation  I asked pt to please call back if has not received instructions or if has any questions

## 2023-05-09 ENCOUNTER — ANESTHESIA (OUTPATIENT)
Dept: GASTROENTEROLOGY | Facility: AMBULATORY SURGERY CENTER | Age: 58
End: 2023-05-09

## 2023-05-09 ENCOUNTER — APPOINTMENT (OUTPATIENT)
Dept: LAB | Facility: MEDICAL CENTER | Age: 58
End: 2023-05-09

## 2023-05-09 ENCOUNTER — HOSPITAL ENCOUNTER (OUTPATIENT)
Dept: GASTROENTEROLOGY | Facility: AMBULATORY SURGERY CENTER | Age: 58
Discharge: HOME | End: 2023-05-09

## 2023-05-09 ENCOUNTER — ANESTHESIA EVENT (OUTPATIENT)
Dept: GASTROENTEROLOGY | Facility: AMBULATORY SURGERY CENTER | Age: 58
End: 2023-05-09

## 2023-05-09 VITALS
TEMPERATURE: 97.7 F | SYSTOLIC BLOOD PRESSURE: 124 MMHG | HEIGHT: 60 IN | OXYGEN SATURATION: 98 % | WEIGHT: 110 LBS | DIASTOLIC BLOOD PRESSURE: 62 MMHG | BODY MASS INDEX: 21.6 KG/M2 | HEART RATE: 58 BPM | RESPIRATION RATE: 18 BRPM

## 2023-05-09 DIAGNOSIS — E78.00 PURE HYPERCHOLESTEROLEMIA: ICD-10-CM

## 2023-05-09 DIAGNOSIS — R07.82 INTERCOSTAL PAIN: ICD-10-CM

## 2023-05-09 DIAGNOSIS — R10.13 EPIGASTRIC PAIN: ICD-10-CM

## 2023-05-09 LAB
ALBUMIN SERPL BCP-MCNC: 3.4 G/DL (ref 3.5–5)
ALP SERPL-CCNC: 60 U/L (ref 46–116)
ALT SERPL W P-5'-P-CCNC: 30 U/L (ref 12–78)
ANION GAP SERPL CALCULATED.3IONS-SCNC: 0 MMOL/L (ref 4–13)
AST SERPL W P-5'-P-CCNC: 30 U/L (ref 5–45)
BILIRUB SERPL-MCNC: 0.92 MG/DL (ref 0.2–1)
BUN SERPL-MCNC: 19 MG/DL (ref 5–25)
CALCIUM ALBUM COR SERPL-MCNC: 9.2 MG/DL (ref 8.3–10.1)
CALCIUM SERPL-MCNC: 8.7 MG/DL (ref 8.3–10.1)
CHLORIDE SERPL-SCNC: 110 MMOL/L (ref 96–108)
CHOLEST SERPL-MCNC: 177 MG/DL
CO2 SERPL-SCNC: 28 MMOL/L (ref 21–32)
CREAT SERPL-MCNC: 0.68 MG/DL (ref 0.6–1.3)
GFR SERPL CREATININE-BSD FRML MDRD: 96 ML/MIN/1.73SQ M
GLUCOSE P FAST SERPL-MCNC: 83 MG/DL (ref 65–99)
HDLC SERPL-MCNC: 78 MG/DL
LDLC SERPL CALC-MCNC: 92 MG/DL (ref 0–100)
POTASSIUM SERPL-SCNC: 3.6 MMOL/L (ref 3.5–5.3)
PROT SERPL-MCNC: 6.8 G/DL (ref 6.4–8.4)
SODIUM SERPL-SCNC: 138 MMOL/L (ref 135–147)
TRIGL SERPL-MCNC: 34 MG/DL

## 2023-05-09 RX ORDER — PANTOPRAZOLE SODIUM 40 MG/1
40 TABLET, DELAYED RELEASE ORAL DAILY
Qty: 30 TABLET | Refills: 1 | Status: SHIPPED | OUTPATIENT
Start: 2023-05-09

## 2023-05-09 RX ORDER — SODIUM CHLORIDE, SODIUM LACTATE, POTASSIUM CHLORIDE, CALCIUM CHLORIDE 600; 310; 30; 20 MG/100ML; MG/100ML; MG/100ML; MG/100ML
125 INJECTION, SOLUTION INTRAVENOUS CONTINUOUS
Status: DISCONTINUED | OUTPATIENT
Start: 2023-05-09 | End: 2023-05-13 | Stop reason: HOSPADM

## 2023-05-09 RX ORDER — SODIUM CHLORIDE, SODIUM LACTATE, POTASSIUM CHLORIDE, CALCIUM CHLORIDE 600; 310; 30; 20 MG/100ML; MG/100ML; MG/100ML; MG/100ML
125 INJECTION, SOLUTION INTRAVENOUS CONTINUOUS
Status: CANCELLED | OUTPATIENT
Start: 2023-05-09

## 2023-05-09 RX ORDER — PROPOFOL 10 MG/ML
INJECTION, EMULSION INTRAVENOUS AS NEEDED
Status: DISCONTINUED | OUTPATIENT
Start: 2023-05-09 | End: 2023-05-09

## 2023-05-09 RX ORDER — LIDOCAINE HYDROCHLORIDE 20 MG/ML
INJECTION, SOLUTION EPIDURAL; INFILTRATION; INTRACAUDAL; PERINEURAL AS NEEDED
Status: DISCONTINUED | OUTPATIENT
Start: 2023-05-09 | End: 2023-05-09

## 2023-05-09 RX ADMIN — PROPOFOL 100 MG: 10 INJECTION, EMULSION INTRAVENOUS at 11:45

## 2023-05-09 RX ADMIN — PROPOFOL 30 MG: 10 INJECTION, EMULSION INTRAVENOUS at 11:46

## 2023-05-09 RX ADMIN — SODIUM CHLORIDE, SODIUM LACTATE, POTASSIUM CHLORIDE, CALCIUM CHLORIDE: 600; 310; 30; 20 INJECTION, SOLUTION INTRAVENOUS at 11:35

## 2023-05-09 RX ADMIN — PROPOFOL 20 MG: 10 INJECTION, EMULSION INTRAVENOUS at 11:47

## 2023-05-09 RX ADMIN — PROPOFOL 20 MG: 10 INJECTION, EMULSION INTRAVENOUS at 11:49

## 2023-05-09 RX ADMIN — LIDOCAINE HYDROCHLORIDE 60 MG: 20 INJECTION, SOLUTION EPIDURAL; INFILTRATION; INTRACAUDAL; PERINEURAL at 11:45

## 2023-05-09 NOTE — ANESTHESIA POSTPROCEDURE EVALUATION
Post-Op Assessment Note    CV Status:  Stable  Pain Score: 0    Pain management: adequate     Mental Status:  Alert and awake   Hydration Status:  Euvolemic   PONV Controlled:  Controlled   Airway Patency:  Patent      Post Op Vitals Reviewed: Yes      Staff: Anesthesiologist, CRNA         No notable events documented      BP 97/55 (05/09/23 1155)    Temp      Pulse 69 (05/09/23 1155)   Resp 18 (05/09/23 1155)    SpO2 98 % (05/09/23 1155)

## 2023-05-09 NOTE — ANESTHESIA PREPROCEDURE EVALUATION
Procedure:  EGD    Relevant Problems   No relevant active problems        Physical Exam    Airway    Mallampati score: I  TM Distance: >3 FB  Neck ROM: full     Dental   No notable dental hx     Cardiovascular      Pulmonary      Other Findings        Anesthesia Plan  ASA Score- 2     Anesthesia Type- IV sedation with anesthesia with ASA Monitors  Additional Monitors:   Airway Plan:           Plan Factors-Exercise tolerance (METS): >4 METS  Chart reviewed  Patient summary reviewed  Patient is not a current smoker  There is medical exclusion for perioperative obstructive sleep apnea risk education  Induction- intravenous  Postoperative Plan-     Informed Consent- Anesthetic plan and risks discussed with patient  I personally reviewed this patient with the CRNA  Discussed and agreed on the Anesthesia Plan with the CRNA  Carisa Arteaga

## 2023-05-09 NOTE — H&P
History and Physical - [facility]  Letty Joel 59 y o  female MRN: [id number]                  HPI: Letty Joel is a 59y o  year old female who presents for epigastric pain and intercostal pain      REVIEW OF SYSTEMS: Per the HPI, and otherwise unremarkable      Historical Information   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    • Spider veins of both lower extremities    • Umbilical hernia    • Umbilical hernia without obstruction and without gangrene 08/11/2020   • Wears glasses      Past Surgical History:   Procedure Laterality Date   • AUGMENTATION MAMMAPLASTY Bilateral 2014    retro pec saline   • COLONOSCOPY  2014   • EGD     • HERNIA REPAIR     • WA CMBND ANTERPOST COLPORRAPHY W/CYSTO N/A 12/22/2020    Procedure: ANT POSTCOLPORRHAPHY;  Surgeon: Ca Brennan MD;  [facility];  Service: UroGynecology          • WA CYSTOURETHROSCOPY N/A 12/22/2020    Procedure: Collette Balding;  Surgeon: Ca Brennan MD;  [facility];  Service: UroGynecology          • WA LAPAROSCOPY SURG CHOLECYSTECTOMY N/A 12/22/2020    Procedure: LAP CARLOTTA;  Surgeon: Deisi Sims MD;  [facility];  Service: General   • WA RPR UMBILICAL HRNA 5 YRS/> REDUCIBLE N/A 12/22/2020    Procedure: REPAIR HERNIA UMBILICAL;  Surgeon: Deisi Sims MD;  [facility];  Service: General   • WA SLING OPERATION STRESS INCONTINENCE N/A 12/22/2020    Procedure: PUBOVAGINAL SLING;  Surgeon: Ca Brennan MD;  [facility];  Service: UroGynecology            Social History   Social History     Substance and Sexual Activity   Alcohol Use No     Social History     Substance and Sexual Activity   Drug Use No     Social History     Tobacco Use   Smoking Status Never   Smokeless Tobacco Never     Family History   Problem Relation Age of Onset   • Hypertension Mother    • Diabetes Father    • No Known Problems Sister    • No Known Problems Sister    • No Known Problems Sister    • No Known Problems Sister    • Other Son         lyme disease       Meds/Allergies     (Not in a hospital admission)      No Known Allergies    Objective     Pulse 61   Temp 97 7 °F (36 5 °C) (Temporal)   Resp 18   Ht 5' (1 524 m)   Wt 49 9 kg (110 lb)   SpO2 97%   BMI 21 48 kg/m²       PHYSICAL EXAM    Gen: NAD  CV: RRR  CHEST: Clear  ABD: soft, NT/ND  EXT: no edema      ASSESSMENT/PLAN:  This is a 59y o  year old female here for EGD, and she is stable and optimized for her procedure

## 2023-05-22 ENCOUNTER — TELEPHONE (OUTPATIENT)
Dept: OBGYN CLINIC | Facility: HOSPITAL | Age: 58
End: 2023-05-22

## 2023-05-23 ENCOUNTER — TELEPHONE (OUTPATIENT)
Dept: OTHER | Facility: OTHER | Age: 58
End: 2023-05-23

## 2023-05-24 NOTE — TELEPHONE ENCOUNTER
SPOKE WITH PT, REVIEWED BX RESULTS  PT DID NOT START PPI SINCE SHE READ IT COULD DAMAGE HER KIDNEYS  WHAT DO YOU RECOMMEND?

## 2023-05-25 DIAGNOSIS — R10.13 EPIGASTRIC PAIN: Primary | ICD-10-CM

## 2023-05-25 DIAGNOSIS — K21.9 GASTROESOPHAGEAL REFLUX DISEASE WITHOUT ESOPHAGITIS: ICD-10-CM

## 2023-05-26 RX ORDER — FAMOTIDINE 40 MG/1
40 TABLET, FILM COATED ORAL DAILY
Qty: 30 TABLET | Refills: 2 | Status: SHIPPED | OUTPATIENT
Start: 2023-05-26

## 2023-06-01 DIAGNOSIS — R07.82 INTERCOSTAL PAIN: ICD-10-CM

## 2023-06-01 DIAGNOSIS — R10.13 EPIGASTRIC PAIN: ICD-10-CM

## 2023-06-01 RX ORDER — PANTOPRAZOLE SODIUM 40 MG/1
TABLET, DELAYED RELEASE ORAL
Qty: 90 TABLET | Refills: 1 | Status: SHIPPED | OUTPATIENT
Start: 2023-06-01

## 2023-06-02 ENCOUNTER — TELEPHONE (OUTPATIENT)
Dept: FAMILY MEDICINE CLINIC | Facility: CLINIC | Age: 58
End: 2023-06-02

## 2023-06-02 DIAGNOSIS — M79.606 PAIN OF LOWER EXTREMITY, UNSPECIFIED LATERALITY: Primary | ICD-10-CM

## 2023-06-02 NOTE — TELEPHONE ENCOUNTER
Patient called and states that she has a tear in the joint of her right leg  She had been seeing Dr Crystal Pinzon (sp?) and had imaging done with him but he will not do the surgery due to her age ? And she would like to know if you could refer her to a surgeon that does microscopic surgery for this condition  She does have all the discs from the MRI to show the problem

## 2023-06-05 DIAGNOSIS — N32.81 OAB (OVERACTIVE BLADDER): ICD-10-CM

## 2023-06-05 RX ORDER — OXYBUTYNIN CHLORIDE 15 MG/1
TABLET, EXTENDED RELEASE ORAL
Qty: 90 TABLET | Refills: 1 | Status: SHIPPED | OUTPATIENT
Start: 2023-06-05

## 2023-06-21 DIAGNOSIS — K21.9 GASTROESOPHAGEAL REFLUX DISEASE WITHOUT ESOPHAGITIS: ICD-10-CM

## 2023-06-21 DIAGNOSIS — R10.13 EPIGASTRIC PAIN: ICD-10-CM

## 2023-06-21 RX ORDER — FAMOTIDINE 40 MG/1
TABLET, FILM COATED ORAL
Qty: 90 TABLET | Refills: 1 | Status: SHIPPED | OUTPATIENT
Start: 2023-06-21

## 2023-06-23 ENCOUNTER — NURSE TRIAGE (OUTPATIENT)
Age: 58
End: 2023-06-23

## 2023-06-23 NOTE — TELEPHONE ENCOUNTER
----- Message from [facility] sent at 6/23/2023  8:49 AM EDT -----  Pt calling because she has questions on medication  She states she has been taking famotidine, and there was a prescription sent to the pharmacy for pantoprazole  She would like to know which one she should be taking  She is also concerned because she has osteoporosis and the research she did states pantoprazole shouldn't be taken if she has that

## 2023-06-23 NOTE — TELEPHONE ENCOUNTER
SPOKE WITH PT, PREVIOUSLY PRESCRIBED PPI, PREFERS H2 BLOCKER DUE TO POTENTIAL SIDE EFFECTS, PT INFORMED TO CONTINUE TAKING H2 BLOCKER

## 2023-07-02 NOTE — PROGRESS NOTES
Name: Benson Alaniz      : 1965      MRN: [id number]  Encounter Provider: Ron Angel MD  Encounter Date: 7/3/2023   Encounter department: [address] [place] [address]     1. Bed wetting  Assessment & Plan:  Patient is stable  and will continue present plan of care and reassess at next routine visit. All questions about this problem from patient were answered today. 2. Venous insufficiency of both lower extremities  Assessment & Plan:  Patient is stable  and will continue present plan of care and reassess at next routine visit. All questions about this problem from patient were answered today. 3. Gastroesophageal reflux disease with esophagitis without hemorrhage  Assessment & Plan:  Patient to continue with present therapy and decrease caffeine, avoid ETOH and smoking to decrease acid production. Pt should also cease eating prior to bedtime and avoid excessive fluid intake prior to sleep. May use antacids as needed for breakthrough GERD. All pateint questions answered today about this condition. 4. Other fatigue  -     Comprehensive metabolic panel; Future  -     CBC and differential; Future  -     Magnesium; Future  -     Uric acid; Future  -     Urinalysis with microscopic  -     TSH, 3rd generation with Free T4 reflex; Future    5. Tick bite, unspecified site, initial encounter  -     Lyme Total Antibody Profile with reflex to WB; Future    6. Screening cholesterol level  -     Lipid Panel with Direct LDL reflex; Future        Depression Screening and Follow-up Plan: Patient was screened for depression during today's encounter. They screened negative with a PHQ-2 score of 0. Subjective     This is a 68-year-old female today for checkup on multiple medical problems. With osteoporosis and also GERD.   Patient recently had seen GI and was started on some Protonix and she feel little taking it so she went to Pepcid usually does not have a lot of problems with heartburn but she does have she will spasm which she stated but she still getting it. I stressed with her the importance taking at least the Pepcid for now to try and keep that at [facility] and if she is getting more problems with esophageal spasms she should consider getting back on the Protonix crease her risk for developing Lucero's or cancer in the future. She is not due for Prolia shot until September which she will make as a nurse visit she is also due for lab work at that time which I will order for her. Patient also has a lot of work outside she has had tick bites she would have to get tested for Lyme disease and will I will order that with her lab work and she is okay waiting until September to have that done. And also is doing well with her overactive bladder she states her oxybutynin gives her some dry mouth but still sleepy but she gives it a great of the and I told her that there is other options if available if she chooses if she is having more problems with in the future and she will take that in consideration. Patient relates to me that she will take her Protonix until it is finished in about a month and then she will consider using the Pepcid in the future I told her to make note of how many times she gets esophageal spasms on the Protonix. Review of Systems   Constitutional: Negative for activity change, appetite change, fatigue and fever. HENT: Negative for congestion, ear pain, postnasal drip, rhinorrhea, sinus pressure, sinus pain, sneezing and sore throat. Eyes: Negative for pain and redness. Respiratory: Negative for apnea, cough, chest tightness, shortness of breath and wheezing. Cardiovascular: Negative for chest pain, palpitations and leg swelling. Gastrointestinal: Negative for abdominal pain, constipation, diarrhea, nausea and vomiting. Endocrine: Negative for cold intolerance and heat intolerance. Genitourinary: Positive for difficulty urinating.  Negative for dysuria, frequency, hematuria and urgency. Musculoskeletal: Positive for arthralgias. Negative for back pain, gait problem and myalgias. Skin: Negative for rash. Neurological: Negative for dizziness, speech difficulty, weakness, numbness and headaches. Hematological: Does not bruise/bleed easily. Psychiatric/Behavioral: Negative for agitation, confusion and hallucinations.        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    • Spider veins of both lower extremities    • Umbilical hernia    • Umbilical hernia without obstruction and without gangrene 08/11/2020   • Wears glasses      Past Surgical History:   Procedure Laterality Date   • AUGMENTATION MAMMAPLASTY Bilateral 2014    retro pec saline   • COLONOSCOPY  2014   • EGD     • HERNIA REPAIR     • NJ CMBND ANTERPOST COLPORRAPHY W/CYSTO N/A 12/22/2020    Procedure: ANT POSTCOLPORRHAPHY;  Surgeon: Zoraida Hartmann MD;  [facility];  Service: UroGynecology          • NJ CYSTOURETHROSCOPY N/A 12/22/2020    Procedure: Glendy Sloop;  Surgeon: Zoraida Hartmann MD;  [facility];  Service: UroGynecology          • NJ LAPAROSCOPY SURG CHOLECYSTECTOMY N/A 12/22/2020    Procedure: LAP CARLOTTA;  Surgeon: Masha Mcclelland MD;  [facility];  Service: General   • NJ RPR UMBILICAL HRNA 5 YRS/> REDUCIBLE N/A 12/22/2020    Procedure: REPAIR HERNIA UMBILICAL;  Surgeon: Masha Mcclelland MD;  [facility];  Service: General   • NJ SLING OPERATION STRESS INCONTINENCE N/A 12/22/2020    Procedure: PUBOVAGINAL SLING;  Surgeon: Zoraida Hartmann MD;  [facility];  Service: UroGynecology            Family History Problem Relation Age of Onset   • Hypertension Mother    • Diabetes Father    • No Known Problems Sister    • No Known Problems Sister    • No Known Problems Sister    • No Known Problems Sister    • Other Son         lyme disease     Social History     Socioeconomic History   • Marital status: /Civil Union     Spouse name: None   • Number of children: 1   • Years of education: None   • Highest education level: None   Occupational History   • Occupation: Teacher     Comment:    Tobacco Use   • Smoking status: Never   • Smokeless tobacco: Never   Vaping Use   • Vaping Use: Never used   Substance and Sexual Activity   • Alcohol use: No   • Drug use: No   • Sexual activity: Yes     Partners: Male     Birth control/protection: Post-menopausal     Comment:    Other Topics Concern   • None   Social History Narrative    · Most recent tobacco use screenin2019      · Do you currently or have you served in the [address] [place] [address] [employer]:   No      · Were you activated, into active duty, as a member of the [employer] or as a Reservist:   No      Social Determinants of Health     Financial Resource Strain: Not on file   Food Insecurity: Not on file   Transportation Needs: Not on file   Physical Activity: Not on file   Stress: Not on file   Social Connections: Not on file   Intimate Partner Violence: Not on file   Housing Stability: Not on file     Current Outpatient Medications on File Prior to Visit   Medication Sig   • Ascorbic Acid (VITAMIN C PO) Take by mouth daily    • Bilberry, Vaccinium myrtillus, (BILBERRY EXTRACT PO) Take by mouth daily    • BIOTIN PO Take by mouth daily    • Calcium Carbonate-Vitamin D (CALCIUM-D PO) Take by mouth daily    • COLLAGEN PO Take by mouth daily    • Cyanocobalamin (VITAMIN B-12 PO) Take by mouth daily    • estradiol (ESTRACE) 0.1 mg/g vaginal cream Insert 1 g into the vagina 2 (two) times a week   • famotidine (PEPCID) 40 MG tablet TAKE 1 TABLET BY MOUTH EVERY DAY   • GLUCOSAMINE-CHONDROITIN PO Take by mouth daily    • Misc Natural Products (TART CHERRY ADVANCED PO) Take by mouth daily    • Misc Natural Products (Turmeric Curcumin) CAPS Take by mouth daily    • Multiple Vitamin (DAILY VITAMIN) tablet Take 1 tablet by mouth daily    • Multiple Vitamins-Minerals (HAIR VITAMINS PO) Take by mouth daily    • NON FORMULARY luti-gold OTC daily/ viviscalpro OTC daily   • oxybutynin (DITROPAN XL) 15 MG 24 hr tablet TAKE 1 TABLET BY MOUTH DAILY AT BEDTIME   • pantoprazole (PROTONIX) 40 mg tablet TAKE 1 TABLET BY MOUTH EVERY DAY   • Pomegranate, Punica granatum, (POMEGRANATE PO) Take by mouth daily    • Probiotic Product (PROBIOTIC DAILY PO) Take by mouth daily    • Pyridoxine HCl (VITAMIN B6 PO) Take by mouth daily    • Red Yeast Rice Extract (RED YEAST RICE PO) Take by mouth daily    • triamcinolone (KENALOG) 0.5 % cream Apply topically 3 (three) times a day     No Known Allergies  Immunization History   Administered Date(s) Administered   • COVID-19 PFIZER VACCINE 0.3 ML IM 03/31/2021, 04/21/2021   • H1N1, All Formulations 01/05/2010   • Influenza, injectable, quadrivalent, preservative free 0.5 mL 10/01/2020   • Zoster Vaccine Recombinant 07/29/2020       Objective     /60 (BP Location: Left arm, Patient Position: Sitting, Cuff Size: Standard)   Pulse 61   Temp 98.6 °F (37 °C) (Temporal)   Resp 16   Ht 5' (1.524 m)   Wt 49.4 kg (109 lb)   SpO2 97%   BMI 21.29 kg/m²     Physical Exam  Vitals and nursing note reviewed. Constitutional:       Appearance: She is well-developed. HENT:      Head: Normocephalic and atraumatic. Nose: Nose normal.   Eyes:      General: No scleral icterus. Conjunctiva/sclera: Conjunctivae normal.      Pupils: Pupils are equal, round, and reactive to light. Neck:      Thyroid: No thyromegaly. Pulmonary:      Effort: Pulmonary effort is normal.      Breath sounds: Normal breath sounds. No wheezing.    Abdominal: General: Bowel sounds are normal. There is no distension. Palpations: Abdomen is soft. Tenderness: There is no abdominal tenderness. There is no guarding or rebound. Musculoskeletal:         General: No tenderness or deformity. Normal range of motion. Cervical back: Normal range of motion and neck supple. Skin:     General: Skin is warm and dry. Findings: No erythema or rash. Neurological:      Mental Status: She is alert and oriented to person, place, and time. Sensory: No sensory deficit. Psychiatric:         Behavior: Behavior normal.         Thought Content:  Thought content normal.         Judgment: Judgment normal.       Toyin Kaiser MD

## 2023-07-02 NOTE — ASSESSMENT & PLAN NOTE
Patient is stable  and will continue present plan of care and reassess at next routine visit. All questions about this problem from patient were answered today. No

## 2023-07-02 NOTE — ASSESSMENT & PLAN NOTE
Patient is stable  and will continue present plan of care and reassess at next routine visit. All questions about this problem from patient were answered today.

## 2023-07-03 ENCOUNTER — OFFICE VISIT (OUTPATIENT)
Dept: FAMILY MEDICINE CLINIC | Facility: CLINIC | Age: 58
End: 2023-07-03
Payer: COMMERCIAL

## 2023-07-03 VITALS
DIASTOLIC BLOOD PRESSURE: 60 MMHG | TEMPERATURE: 98.6 F | HEART RATE: 61 BPM | OXYGEN SATURATION: 97 % | WEIGHT: 109 LBS | HEIGHT: 60 IN | BODY MASS INDEX: 21.4 KG/M2 | SYSTOLIC BLOOD PRESSURE: 112 MMHG | RESPIRATION RATE: 16 BRPM
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
## 2023-07-03 DIAGNOSIS — I87.2 VENOUS INSUFFICIENCY OF BOTH LOWER EXTREMITIES: ICD-10-CM

## 2023-07-03 DIAGNOSIS — Z13.220 SCREENING CHOLESTEROL LEVEL: ICD-10-CM

## 2023-07-03 DIAGNOSIS — K21.00 GASTROESOPHAGEAL REFLUX DISEASE WITH ESOPHAGITIS WITHOUT HEMORRHAGE: ICD-10-CM

## 2023-07-03 DIAGNOSIS — R53.83 OTHER FATIGUE: ICD-10-CM

## 2023-07-03 DIAGNOSIS — N39.44 BED WETTING: Primary | ICD-10-CM

## 2023-07-03 PROCEDURE — 99214 OFFICE O/P EST MOD 30 MIN: CPT | Performed by: FAMILY MEDICINE

## 2023-07-03 PROCEDURE — 3725F SCREEN DEPRESSION PERFORMED: CPT | Performed by: FAMILY MEDICINE

## 2023-07-03 NOTE — ASSESSMENT & PLAN NOTE
Patient to continue with present therapy and decrease caffeine, avoid ETOH and smoking to decrease acid production. Pt should also cease eating prior to bedtime and avoid excessive fluid intake prior to sleep. May use antacids as needed for breakthrough GERD. All pateint questions answered today about this condition.

## 2023-08-09 ENCOUNTER — TELEPHONE (OUTPATIENT)
Age: 58
End: 2023-08-09

## 2023-08-09 ENCOUNTER — OFFICE VISIT (OUTPATIENT)
Dept: GASTROENTEROLOGY | Facility: CLINIC | Age: 58
End: 2023-08-09
Payer: COMMERCIAL

## 2023-08-09 VITALS
WEIGHT: 110 LBS | BODY MASS INDEX: 20.77 KG/M2 | SYSTOLIC BLOOD PRESSURE: 115 MMHG | HEIGHT: 61 IN | DIASTOLIC BLOOD PRESSURE: 62 MMHG | HEART RATE: 60 BPM

## 2023-08-09 DIAGNOSIS — R10.13 EPIGASTRIC PAIN: ICD-10-CM

## 2023-08-09 DIAGNOSIS — Z12.11 COLON CANCER SCREENING: ICD-10-CM

## 2023-08-09 DIAGNOSIS — R10.13 EPIGASTRIC PAIN: Primary | ICD-10-CM

## 2023-08-09 DIAGNOSIS — K21.9 GASTROESOPHAGEAL REFLUX DISEASE WITHOUT ESOPHAGITIS: ICD-10-CM

## 2023-08-09 DIAGNOSIS — R07.82 INTERCOSTAL PAIN: ICD-10-CM

## 2023-08-09 PROCEDURE — 99213 OFFICE O/P EST LOW 20 MIN: CPT | Performed by: INTERNAL MEDICINE

## 2023-08-09 RX ORDER — PANTOPRAZOLE SODIUM 40 MG/1
40 TABLET, DELAYED RELEASE ORAL DAILY
Qty: 90 TABLET | Refills: 1 | Status: SHIPPED | OUTPATIENT
Start: 2023-08-09

## 2023-08-09 RX ORDER — FAMOTIDINE 40 MG/1
TABLET, FILM COATED ORAL
Qty: 180 TABLET | Refills: 1 | Status: SHIPPED | OUTPATIENT
Start: 2023-08-09

## 2023-08-09 NOTE — PROGRESS NOTES
[facility] - Outpatient Follow-up Note  Compa Turner 59 y.o. female MRN: [id number]  Encounter: [id number]          ASSESSMENT AND PLAN:      1. Epigastric pain  Decreased with pantoprazole but pantoprazole causes bloating. Last EGD was in May of this year. This pain is intermittent not associated with vomiting. Should it persist will need imaging like ultrasound. - famotidine (PEPCID) 40 MG tablet; 1 tablet by mouth twice a day  Dispense: 180 tablet; Refill: 1    2. Gastroesophageal reflux disease without esophagitis  Will use famotidine twice a day. She eats healthy. And anti-reflux measures discussed. - famotidine (PEPCID) 40 MG tablet; 1 tablet by mouth twice a day  Dispense: 180 tablet; Refill: 1    3. Colon cancer screening  Next colonoscopy 2030 never had polyps had 2 previous colonoscopies on chart. She will otherwise follow-up in 3 months    ______________________________________________________________________    SUBJECTIVE: Very pleasant 61-year-old lady presents for follow-up of reflux. She tried famotidine on its own had breakthrough started pantoprazole which made her feel much better. She still gets occasional epigastric distress her gallbladder is out. She did have a cholecystectomy several years ago. Colon cancer screenings up-to-date. REVIEW OF SYSTEMS IS OTHERWISE NEGATIVE.       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    • Spider veins of both lower extremities    • Umbilical hernia    • Umbilical hernia without obstruction and without gangrene 08/11/2020   • Wears glasses      Past Surgical History:   Procedure Laterality Date   • AUGMENTATION MAMMAPLASTY Bilateral 2014    retro pec saline   • CHOLECYSTECTOMY     • COLONOSCOPY  2014   • EGD     • HERNIA REPAIR     • SD CMBND ANTERPOST COLPORRAPHY W/CYSTO N/A 12/22/2020    Procedure: ANT POSTCOLPORRHAPHY;  Surgeon: Zoraida Hartmann MD;  [facility];  Service: UroGynecology          • SD CYSTOURETHROSCOPY N/A 12/22/2020    Procedure: Glendy Sloop;  Surgeon: Zoraida Hartmann MD;  [facility];  Service: UroGynecology          • SD LAPAROSCOPY SURG CHOLECYSTECTOMY N/A 12/22/2020    Procedure: LAP CARLOTTA;  Surgeon: Masha Mcclelland MD;  [facility];  Service: General   • SD RPR UMBILICAL HRNA 5 YRS/> REDUCIBLE N/A 12/22/2020    Procedure: REPAIR HERNIA UMBILICAL;  Surgeon: Masha Mcclelland MD;  [facility];  Service: General   • SD SLING OPERATION STRESS INCONTINENCE N/A 12/22/2020    Procedure: PUBOVAGINAL SLING;  Surgeon: Zoraida Hartmann MD;  [facility];  Service: UroGynecology          • UPPER GASTROINTESTINAL ENDOSCOPY       Social History   Social History     Substance and Sexual Activity   Alcohol Use No     Social History     Substance and Sexual Activity   Drug Use Never     Social History     Tobacco Use   Smoking Status Never   Smokeless Tobacco Never     Family History   Problem Relation Age of Onset   • Hypertension Mother    • Diabetes Father    • No Known Problems Sister    • No Known Problems Sister    • No Known Problems Sister    • No Known Problems Sister    • Other Son         lyme disease       Meds/Allergies       Current Outpatient Medications:   •  Bilberry, Vaccinium myrtillus, (BILBERRY EXTRACT PO)  •  BIOTIN PO  •  Calcium Carbonate-Vitamin D (CALCIUM-D PO)  •  COLLAGEN PO  •  Cyanocobalamin (VITAMIN B-12 PO)  •  estradiol (ESTRACE) 0.1 mg/g vaginal cream  •  famotidine (PEPCID) 40 MG tablet  •  GLUCOSAMINE-CHONDROITIN PO  •  Misc Natural Products (Turmeric Curcumin) CAPS  •  Multiple Vitamin (DAILY VITAMIN) tablet  •  Multiple Vitamins-Minerals (HAIR VITAMINS PO)  •  NON FORMULARY  •  oxybutynin (DITROPAN XL) 15 MG 24 hr tablet  •  pantoprazole (PROTONIX) 40 mg tablet  •  Pomegranate, Punica granatum, (POMEGRANATE PO)  •  Probiotic Product (PROBIOTIC DAILY PO)  •  Pyridoxine HCl (VITAMIN B6 PO)  •  Red Yeast Rice Extract (RED YEAST RICE PO)  •  Ascorbic Acid (VITAMIN C PO)  •  Misc Natural Products (TART CHERRY ADVANCED PO)  •  triamcinolone (KENALOG) 0.5 % cream    No Known Allergies        Objective     Blood pressure 115/62, pulse 60, height 5' 1" (1.549 m), weight 49.9 kg (110 lb), not currently breastfeeding. Body mass index is 20.78 kg/m². PHYSICAL EXAM:      General Appearance:   Alert, cooperative, no distress   HEENT:   Normocephalic, atraumatic, anicteric.     Neck:  Supple, symmetrical, trachea midline   Lungs:   Clear to auscultation bilaterally; no rales, rhonchi or wheezing; respirations unlabored    Heart[de-identified]   Regular rate and rhythm; no murmur, rub, or gallop. Abdomen:   Soft, non-tender, non-distended; normal bowel sounds; no masses, no organomegaly    Genitalia:   Deferred    Rectal:   Deferred    Extremities:  No cyanosis, clubbing or edema    Pulses:  2+ and symmetric    Skin:  No jaundice, rashes, or lesions    Lymph nodes:  No palpable cervical lymphadenopathy        Lab Results:   No visits with results within 1 Day(s) from this visit.    Latest known visit with results is:   Hospital Outpatient Visit on 05/09/2023   Component Date Value   • Case Report 05/09/2023                      Value:Surgical Pathology Report                         Case: [id number]                                   Authorizing Provider:  Andres Cohn MD Collected:           05/09/2023 1147              Ordering Location:     [address] [place] [address] Received:            05/10/2023 [facility] Pathologist:           Yordy Stevenson MD                                                       Specimens:   A) - Stomach, cold bx antrum gastritis check for hpy                                                B) - Esophagus, cold bx lower esophagus reflux                                            • Final Diagnosis 05/09/2023                      Value: This result contains rich text formatting which cannot be displayed here. • Note 05/09/2023                      Value: This result contains rich text formatting which cannot be displayed here. • Additional Information 05/09/2023                      Value: This result contains rich text formatting which cannot be displayed here. • Gross Description 05/09/2023                      Value: This result contains rich text formatting which cannot be displayed here. Radiology Results:   No results found.

## 2023-08-09 NOTE — PATIENT INSTRUCTIONS
Next colonoscopy in October 2030 . We will have you take Pepcid twice a day. You can use pantoprazole for breakthrough or use Gaviscon.

## 2023-08-09 NOTE — TELEPHONE ENCOUNTER
Medication Pantoprazole 40mg    Quantity 30    Pharmacy [facility]    PCP/Speciality gastro/sohagia    Enough for 3 days  no     Patient has been taking pepcid and getting no relief would like to go back to taking pantoprazole

## 2023-08-18 ENCOUNTER — OFFICE VISIT (OUTPATIENT)
Dept: OBGYN CLINIC | Facility: MEDICAL CENTER | Age: 58
End: 2023-08-18
Payer: COMMERCIAL

## 2023-08-18 ENCOUNTER — EVALUATION (OUTPATIENT)
Dept: PHYSICAL THERAPY | Facility: MEDICAL CENTER | Age: 58
End: 2023-08-18
Payer: COMMERCIAL

## 2023-08-18 VITALS
BODY MASS INDEX: 21.36 KG/M2 | HEART RATE: 55 BPM | HEIGHT: 60 IN | SYSTOLIC BLOOD PRESSURE: 97 MMHG | WEIGHT: 108.8 LBS | DIASTOLIC BLOOD PRESSURE: 60 MMHG

## 2023-08-18 DIAGNOSIS — M25.551 RIGHT HIP PAIN: Primary | ICD-10-CM

## 2023-08-18 DIAGNOSIS — S73.191A TEAR OF RIGHT ACETABULAR LABRUM, INITIAL ENCOUNTER: ICD-10-CM

## 2023-08-18 DIAGNOSIS — S73.191A TEAR OF RIGHT ACETABULAR LABRUM, INITIAL ENCOUNTER: Primary | ICD-10-CM

## 2023-08-18 DIAGNOSIS — M79.606 PAIN OF LOWER EXTREMITY, UNSPECIFIED LATERALITY: ICD-10-CM

## 2023-08-18 PROCEDURE — 97110 THERAPEUTIC EXERCISES: CPT

## 2023-08-18 PROCEDURE — 99204 OFFICE O/P NEW MOD 45 MIN: CPT | Performed by: ORTHOPAEDIC SURGERY

## 2023-08-18 PROCEDURE — 97161 PT EVAL LOW COMPLEX 20 MIN: CPT

## 2023-08-18 NOTE — PROGRESS NOTES
Assessment:   Diagnosis ICD-10-CM Associated Orders   1. Right hip pain  M25.551       2. Pain of lower extremity, unspecified laterality  M79.606 Ambulatory Referral to Orthopedic Surgery      3. Tear of right acetabular labrum, initial encounter  S73.191A Ambulatory Referral to Physical Therapy          Plan:  MRI and X-rays reviewed  Physical exam   Surgical intervention not recommended at this time  Reommended Physical Therapy for hip and core strengthening    To do next visit:  Return if symptoms worsen or fail to improve. The above stated was discussed in layman's terms and the patient expressed understanding. All questions were answered to the patient's satisfaction. Scribe Attestation    I,:  Den Adhikari am acting as a scribe while in the presence of the attending physician.:       I,:  Kya Bello MD personally performed the services described in this documentation    as scribed in my presence.:             Subjective:   Nathalie Mata is a 59 y.o. female who presents today for an Initial evaluation of Her right hip due to chronic pain. Pt states the pain started 10 yrs ago. She was a runner. She is in PT for her knees which can cause pain in her anterior hip. She also had PT for her hip which did not help. Had X-rays but she states they show no bony issues  She had an MRI which showed an labral tear  Reports pain with pushing a  in the past  Pain noted with bending at the waist and lifting. Denies any injury or traumas   Had one IA Hip injections in the past which did help for a few months  Does report lower lumbar pain    Review of systems negative unless otherwise specified in HPI  Review of Systems   Constitutional: Negative for chills and fever. HENT: Negative for ear pain and sore throat. Eyes: Negative for pain and visual disturbance. Respiratory: Negative for cough and shortness of breath. Cardiovascular: Negative for chest pain and palpitations. Gastrointestinal: Negative for abdominal pain and vomiting. Genitourinary: Negative for dysuria and hematuria. Musculoskeletal: Positive for arthralgias. Negative for back pain. Skin: Negative for color change and rash. Neurological: Negative for seizures and syncope. All other systems reviewed and are negative.       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    • Spider veins of both lower extremities    • Umbilical hernia    • Umbilical hernia without obstruction and without gangrene 08/11/2020   • Wears glasses        Past Surgical History:   Procedure Laterality Date   • AUGMENTATION MAMMAPLASTY Bilateral 2014    retro pec saline   • CHOLECYSTECTOMY     • COLONOSCOPY  2014   • EGD     • HERNIA REPAIR     • VT CMBND ANTERPOST COLPORRAPHY W/CYSTO N/A 12/22/2020    Procedure: ANT POSTCOLPORRHAPHY;  Surgeon: Vipin Castellanos MD;  [facility]: [facility];  Service: UroGynecology          • VT CYSTOURETHROSCOPY N/A 12/22/2020    Procedure: Orquidea Blanton;  Surgeon: Vipin Castellanos MD;  [facility];  Service: UroGynecology          • VT LAPAROSCOPY SURG CHOLECYSTECTOMY N/A 12/22/2020    Procedure: LAP CARLOTTA;  Surgeon: Beto Panda MD;  [facility];  Service: General   • VT RPR UMBILICAL HRNA 5 YRS/> REDUCIBLE N/A 12/22/2020    Procedure: REPAIR HERNIA UMBILICAL;  Surgeon: Beto Panda MD;  [facility];  Service: General   • VT SLING OPERATION STRESS INCONTINENCE N/A 12/22/2020    Procedure: PUBOVAGINAL SLING;  Surgeon: Vipin Castellanos MD;  [facility];  Service: UroGynecology          • UPPER GASTROINTESTINAL ENDOSCOPY         Family History   Problem Relation Age of Onset   • Hypertension Mother    • Diabetes Father    • No Known Problems Sister    • No Known Problems Sister    • No Known Problems Sister    • No Known Problems Sister    • Other Son         lyme disease       Social History     Occupational History   • Occupation: Teacher     Comment:    Tobacco Use   • Smoking status: Never   • Smokeless tobacco: Never   Vaping Use   • Vaping Use: Never used   Substance and Sexual Activity   • Alcohol use: No   • Drug use: Never   • Sexual activity: Yes     Partners: Male     Birth control/protection: Post-menopausal     Comment:          Current Outpatient Medications:   •  Bilberry, Vaccinium myrtillus, (BILBERRY EXTRACT PO), Take by mouth daily , Disp: , Rfl:   •  BIOTIN PO, Take by mouth daily , Disp: , Rfl:   •  Calcium Carbonate-Vitamin D (CALCIUM-D PO), Take by mouth daily , Disp: , Rfl:   •  COLLAGEN PO, Take by mouth daily , Disp: , Rfl:   •  Cyanocobalamin (VITAMIN B-12 PO), Take by mouth daily , Disp: , Rfl:   •  estradiol (ESTRACE) 0.1 mg/g vaginal cream, Insert 1 g into the vagina 2 (two) times a week, Disp: 42.5 g, Rfl: 2  •  famotidine (PEPCID) 40 MG tablet, 1 tablet by mouth twice a day, Disp: 180 tablet, Rfl: 1  •  GLUCOSAMINE-CHONDROITIN PO, Take by mouth daily , Disp: , Rfl:   •  Misc Natural Products (Turmeric Curcumin) CAPS, Take by mouth daily , Disp: , Rfl:   •  Multiple Vitamin (DAILY VITAMIN) tablet, Take 1 tablet by mouth daily , Disp: , Rfl:   •  Multiple Vitamins-Minerals (HAIR VITAMINS PO), Take by mouth daily , Disp: , Rfl:   •  NON FORMULARY, luti-gold OTC daily/ viviscalpro OTC daily, Disp: , Rfl:   •  oxybutynin (DITROPAN XL) 15 MG 24 hr tablet, TAKE 1 TABLET BY MOUTH DAILY AT BEDTIME, Disp: 90 tablet, Rfl: 1  •  pantoprazole (PROTONIX) 40 mg tablet, Take 1 tablet (40 mg total) by mouth daily, Disp: 90 tablet, Rfl: 1  •  Pomegranate, Punica granatum, (POMEGRANATE PO), Take by mouth daily , Disp: , Rfl:   •  Probiotic Product (PROBIOTIC DAILY PO), Take by mouth daily , Disp: , Rfl:   •  Pyridoxine HCl (VITAMIN B6 PO), Take by mouth daily , Disp: , Rfl:   •  Red Yeast Rice Extract (RED YEAST RICE PO), Take by mouth daily , Disp: , Rfl:   •  Ascorbic Acid (VITAMIN C PO), Take by mouth daily  (Patient not taking: Reported on 8/9/2023), Disp: , Rfl:   •  Misc Natural Products (TART CHERRY ADVANCED PO), Take by mouth daily  (Patient not taking: Reported on 8/9/2023), Disp: , Rfl:   •  triamcinolone (KENALOG) 0.5 % cream, Apply topically 3 (three) times a day (Patient not taking: Reported on 8/9/2023), Disp: 30 g, Rfl: 2    No Known Allergies       Vitals:    08/18/23 0921   BP: 97/60   Pulse: 55       Objective:                    Right Hip Exam     Tenderness   The patient is experiencing no tenderness. Range of Motion   The patient has normal right hip ROM. External rotation: normal   Internal rotation: normal     Muscle Strength   The patient has normal right hip strength. Other   Sensation: normal  Pulse: present      Left Hip Exam     Range of Motion   External rotation: normal   Internal rotation: normal             Diagnostics, reviewed and taken today if performed as documented: The attending physician has personally reviewed the pertinent films in PACS and interpretation is as follows:   Right Hip    MRI:  Anterior labral tear    Xrays:  Mild degenerative changes       Procedures, if performed today:    Procedures    None performed     Portions of the record may have been created with voice recognition software. Occasional wrong word or "sound a like" substitutions may have occurred due to the inherent limitations of voice recognition software. Read the chart carefully and recognize, using context, where substitutions have occurred.

## 2023-08-18 NOTE — PROGRESS NOTES
PT Evaluation     Today's date: 2023  Patient name: Lola Armando  : 1965  MRN: [id number]  Referring provider: Abilio Bronson  Dx:   Encounter Diagnosis     ICD-10-CM    1. Tear of right acetabular labrum, initial encounter  S73.191A Ambulatory Referral to Physical Therapy     PT plan of care cert/re-cert          Start Time: 1145  Stop Time: 1240  Total time in clinic (min): 55 minutes    Assessment  Assessment details: Lola Armando is a 59 y.o. female who presents to PT with a referral from Dr. Sydni Guzman for a medical diagnosis of Tear of right acetabular labrum, initial encounter. Patient presents to PT with limitations in strength and functional mobility secondary to pain, decreased muscular endurance and decreased neuromuscular control. Patient demonstrated decreased hip flexion, extension, abduction, adduction, internal rotation and external rotation strength. Patient noted no tenderness during palpation. Patient's key impairments include: decreased strength, decreased endurance and pain with functional activities. The limitations listed above affect patient's ability to lift/carry, push, pull, stand for prolonged periods of time, squat, navigate stairs and exercise, recreational activities, and ADLs and decrease patient's quality of life. Patient to benefit from skilled PT to address these limitations,  improve strength, reduce pain, improve activity tolerance and improve quality of life   Impairments: activity intolerance, impaired physical strength, lacks appropriate home exercise program, pain with function, weight-bearing intolerance, poor posture  and poor body mechanics    Symptom irritability: lowUnderstanding of Dx/Px/POC: good   Prognosis: good    Goals  STG (2-4 Weeks)  Patient will have an increase in global hip strength to 4/5 to help decrease pain with function in 4 weeks.   Patient will have a decrease in pain at worst by 2 points on the NPRS to improve quality of life in 4 weeks. Patient will be efficient and compliant with comprehensive  In 4 weeks. LTG (4-8 Weeks)  Patient will have a FOTO of anticipated or greater by discharge  Patient will be able to go a whole work day without pain to improve quality of life by discharge. Patient will be able to go up and down stairs without pain to improve quality of life at work by discharge. Plan  Patient would benefit from: skilled physical therapy  Planned modality interventions: TENS, thermotherapy: hydrocollator packs and cryotherapy  Planned therapy interventions: abdominal trunk stabilization, IASTM, joint mobilization, activity modification, kinesiology taping, ADL training, manual therapy, massage, Luis taping, balance, balance/weight bearing training, motor coordination training, behavior modification, muscle pump exercises, body mechanics training, nerve gliding, neuromuscular re-education, breathing training, patient education, postural training, coordination, self care, transfer training, therapeutic training, therapeutic exercise, therapeutic activities, stretching, strengthening, fine motor coordination training, flexibility, functional ROM exercises, gait training, graded activity, graded exercise, graded motor, home exercise program, IADL retraining and work reintegration  Frequency: 2x week  Duration in weeks: 12  Plan of Care beginning date: 8/18/2023  Plan of Care expiration date: 11/10/2023  Treatment plan discussed with: patient        Subjective Evaluation    History of Present Illness  Onset date: 10 years ago. Mechanism of injury: trauma  Mechanism of injury: Patient reports to PT with a CC of R hip pain that started over 10 years ago when she was running a lot. Patient currently works as a teacher [employer]. Patient rated their current pain 1/10, at its worst 9/10, and at its best 0/10 on the NPRS. Patient described the pain as sharp and stated that it lasts 2-4 hours.  Patient stated that rest makes it better, and standing, walking, kneeling, carrying, lifting, doing work around the house, and stairs makes it worse. Patient reported that the pain is worse at night after a long day and denies waking them at night. Patient denies changes to  bowel and bladder, chest pain, night pain, or fever. Patient reports receiving injections for CC. Patient's goals of PT are to decrease pain, increase strength, return to recreational activities and work without pain           Recurrent probem    Quality of life: good    Patient Goals  Patient goals for therapy: decreased pain, increased strength, return to sport/leisure activities and return to work    Pain  Current pain ratin  At best pain ratin  At worst pain ratin  Quality: sharp  Relieving factors: rest  Aggravating factors: standing, walking, stair climbing, running and lifting  Progression: no change          Objective     Palpation   Left   No palpable tenderness to the adductor brevis, adductor longus, adductor contreras, gluteus medius, rectus femoris, sartorius and TFL. Right   No palpable tenderness to the adductor brevis, adductor longus, adductor contreras, gluteus medius, rectus femoris, sartorius and TFL. Tenderness     Left Hip   No tenderness in the ASIS, PSIS, greater trochanter and iliac crest.     Right Hip   No tenderness in the ASIS, PSIS, greater trochanter and iliac crest.     Lumbar Screen  Lumbar range of motion within normal limits.     Neurological Testing     Sensation     Hip   Left Hip   Intact: light touch    Right Hip   Intact: light touch    Reflexes   Left   Patellar (L4): normal (2+)  Achilles (S1): normal (2+)    Right   Patellar (L4): normal (2+)  Achilles (S1): normal (2+)    Active Range of Motion   Left Hip   Normal active range of motion    Right Hip   Normal active range of motion  Flexion: with pain  Abduction: with pain    Passive Range of Motion   Left Hip   Normal passive range of motion    Right Hip Normal passive range of motion  Flexion: with pain  Abduction: with pain    Joint Play   Left Hip   Joints within functional limits are the posterior hip capsule and anterior hip capsule. Right Hip   Joints within functional limits are the posterior hip capsule and anterior hip capsule. Strength/Myotome Testing     Left Hip   Planes of Motion   Flexion: 3+  Extension: 3+  Abduction: 4-  Adduction: 4-  External rotation: 3+  Internal rotation: 3+    Right Hip   Planes of Motion   Flexion: 3+  Extension: 3+  Abduction: 4-  Adduction: 4-  External rotation: 3+  Internal rotation: 3+    Tests     Left Hip   Negative Ely's, DANIEL, FADIR, scour, SI compression and SI distraction. Right Hip   Negative Ely's, DANIEL, FADIR, scour, SI compression and SI distraction. HEP demonstrated and performed:    Access Code: [email or web address]  URL: [email or web address]  Date: 08/18/2023  Prepared by: Olivia Cortez    Exercises  - Single Leg Bridge  - 1 x daily - 7 x weekly - 3 sets - 10 reps  - Supine Piriformis Stretch Pulling Heel to Hip  - 1 x daily - 7 x weekly - 3 sets - 10 reps  - Supine Hamstring Stretch with Strap  - 1 x daily - 7 x weekly - 3 sets - 10 reps  - Supine Active Straight Leg Raise  - 1 x daily - 7 x weekly - 3 sets - 10 reps       Precautions: standard precautions,   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    • Spider veins of both lower extremities    • Umbilical hernia    • Umbilical hernia without obstruction and without gangrene 08/11/2020   • Wears glasses            Manuals             PROM Neuro Re-Ed             TA Activation             SLR             SAQ             Balance                                                    Ther Ex             Hamstring Stretch             ITB Stretch             Quad Stretch             Clamshells             LAQ             Mini Squats                                       Ther Activity                                       Gait Training                                       Modalities

## 2023-08-21 ENCOUNTER — OFFICE VISIT (OUTPATIENT)
Dept: PHYSICAL THERAPY | Facility: MEDICAL CENTER | Age: 58
End: 2023-08-21
Payer: COMMERCIAL

## 2023-08-21 DIAGNOSIS — S73.191A TEAR OF RIGHT ACETABULAR LABRUM, INITIAL ENCOUNTER: Primary | ICD-10-CM

## 2023-08-21 PROCEDURE — 97140 MANUAL THERAPY 1/> REGIONS: CPT

## 2023-08-21 PROCEDURE — 97112 NEUROMUSCULAR REEDUCATION: CPT

## 2023-08-21 PROCEDURE — 97110 THERAPEUTIC EXERCISES: CPT

## 2023-08-21 NOTE — PROGRESS NOTES
Daily Note     Today's date: 2023  Patient name: Lloyd Victor  : 1965  MRN: [id number]  Referring provider: Kaiden Reyes  Dx:   Encounter Diagnosis     ICD-10-CM    1. Tear of right acetabular labrum, initial encounter  S73.191A           Start Time: 1005  Stop Time: 1049  Total time in clinic (min): 44 minutes    Subjective: Patient stated that she is feeling pretty good today. She stated that she was completing her home exercises but did more reps than instructed due to misunderstanding. Patient reported feeling tired and sore after, but feels better today. Patient educate on correct way to perform HEP, verbalized understanding. Objective: See treatment diary below      Assessment: Tolerated treatment well. Patient educated to control eccentric motion of exercises, verbalized and demonstrated understanding. Patient able to progress with exercises as tolerated. No increase in pain or symptoms following treatment session. Patient demonstrated fatigue post treatment, exhibited good technique with therapeutic exercises and would benefit from continued PT. Plan: Continue per plan of care.       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    • Spider veins of both lower extremities    • Umbilical hernia    • Umbilical hernia without obstruction and without gangrene 2020   • Wears glasses            Manuals             PROM IB                                                   Neuro Re-Ed             TA Activation             SLR 2x10 2#            SAQ 2x10 4# Balance                                                    Ther Ex             Hamstring Stretch 30"x3            ITB Stretch             Quad/rectus femoris Stretch 30" 3x            Clamshells 20x GTB            LAQ 2x10 5#            TRX Squats 20x            Side-stepping GTB 5 laps at stairs            Bike 5'            Bridge Single Leg BL 2x10             Step-ups             Piriformis Stretch 30" 3x            Ther Activity                                       Gait Training                                       Modalities

## 2023-08-22 ENCOUNTER — OFFICE VISIT (OUTPATIENT)
Dept: PHYSICAL THERAPY | Facility: MEDICAL CENTER | Age: 58
End: 2023-08-22
Payer: COMMERCIAL

## 2023-08-22 DIAGNOSIS — S73.191A TEAR OF RIGHT ACETABULAR LABRUM, INITIAL ENCOUNTER: Primary | ICD-10-CM

## 2023-08-22 PROCEDURE — 97140 MANUAL THERAPY 1/> REGIONS: CPT

## 2023-08-22 PROCEDURE — 97110 THERAPEUTIC EXERCISES: CPT

## 2023-08-22 NOTE — PROGRESS NOTES
Daily Note     Today's date: 2023  Patient name: Hans Uribe  : 1965  MRN: [id number]  Referring provider: Kourtney Silvestre  Dx:   Encounter Diagnosis     ICD-10-CM    1. Tear of right acetabular labrum, initial encounter  S73.191A           Start Time: 1529  Stop Time: 1616  Total time in clinic (min): 47 minutes    Subjective: Patient stated that her hip is feeling alright. She stated that she does her therapy exercises and then other exercises after so she is sore. Objective: See treatment diary below      Assessment: Tolerated treatment well. Pateitn able to progress with exercises as tolerated. Patient noted some knee pain with lunges, resided with rest. Patient noted no increase in pain following treatment session. Patient demonstrated fatigue post treatment, exhibited good technique with therapeutic exercises and would benefit from continued PT      Plan: Continue per plan of care.       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    • Spider veins of both lower extremities    • Umbilical hernia    • Umbilical hernia without obstruction and without gangrene 2020   • Wears glasses            Manuals            PROM IB IB                                                  Neuro Re-Ed             TA Activation             SLR 2x10 2# 2x10 2#  Flex  2x10 2# Abd           SAQ 2x10 4# 2x10 4#           Balance                                                    Ther Ex             Hamstring Stretch 30"x3 30# 3x           ITB Stretch             Quad/rectus femoris Stretch 30" 3x 30" 3x           Clamshells 20x GTB            LAQ 2x10 5#            TRX Squats 20x 2x10           Side-stepping GTB 5 laps at stairs YTB 3 laps length of wall           Bike 5' 5'           Bridge Single Leg BL 2x10  Single Leg 2x10           Step-ups  15x at stairs           Step-downs  15x at stairs           Lunges   2x10           Piriformis Stretch 30" 3x 30" 3x           Ther Activity                                       Gait Training                                       Modalities

## 2023-08-29 ENCOUNTER — OFFICE VISIT (OUTPATIENT)
Dept: PHYSICAL THERAPY | Facility: MEDICAL CENTER | Age: 58
End: 2023-08-29
Payer: COMMERCIAL

## 2023-08-29 DIAGNOSIS — S73.191A TEAR OF RIGHT ACETABULAR LABRUM, INITIAL ENCOUNTER: Primary | ICD-10-CM

## 2023-08-29 PROCEDURE — 97140 MANUAL THERAPY 1/> REGIONS: CPT

## 2023-08-29 PROCEDURE — 97112 NEUROMUSCULAR REEDUCATION: CPT

## 2023-08-29 PROCEDURE — 97110 THERAPEUTIC EXERCISES: CPT

## 2023-08-29 NOTE — PROGRESS NOTES
Daily Note     Today's date: 2023  Patient name: Vahid Tenorio  : 1965  MRN: [id number]  Referring provider: Cayden Christianson  Dx:   Encounter Diagnosis     ICD-10-CM    1. Tear of right acetabular labrum, initial encounter  S73.191A           Start Time: 1550  Stop Time: 1632  Total time in clinic (min): 42 minutes    Subjective: Patient stated that her hip is feeling better. She stated that she was able to walk on the treadmill the other day for 50 minutes without pain. Objective: See treatment diary below      Assessment: Tolerated treatment well. Patient was able to progress with exercises as tolerated. Patient needed verbal cueing to correct squat form. Patient noted no increase in pain following exercises. Patient reported improvements overall with PT. Patient demonstrated fatigue post treatment, exhibited good technique with therapeutic exercises and would benefit from continued PT      Plan: Continue per plan of care.       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    • Spider veins of both lower extremities    • Umbilical hernia    • Umbilical hernia without obstruction and without gangrene 2020   • Wears glasses          PT 1:1 entire time  Manuals           PROM IB IB IB                                                 Neuro Re-Ed             TA Activation             SLR 2x10 2# 2x10 2#  Flex  2x10 2# Abd 2x10 3# flex  2x10 3# abd          SAQ 2x10 4# 2x10 4# 20x 5#          Balance             Adduction   2x10                                    Ther Ex Hamstring Stretch 30"x3 30# 3x 30" 3x          ITB Stretch             Quad/rectus femoris Stretch 30" 3x 30" 3x           Clamshells 20x GTB  20x GTB          LAQ 2x10 5#            TRX Squats 20x 2x10 20x          Side-stepping GTB 5 laps at stairs YTB 3 laps length of wall           Bike 5' 5'           Bridge Single Leg BL 2x10  Single Leg 2x10 Single leg 2x10          Step-ups  15x at stairs           Step-downs  15x at stairs           Lunges   2x10 2x10          Piriformis Stretch 30" 3x 30" 3x 30" 3x          Ther Activity                                       Gait Training                                       Modalities

## 2023-08-30 ENCOUNTER — OFFICE VISIT (OUTPATIENT)
Dept: PHYSICAL THERAPY | Facility: MEDICAL CENTER | Age: 58
End: 2023-08-30
Payer: COMMERCIAL

## 2023-08-30 DIAGNOSIS — S73.191A TEAR OF RIGHT ACETABULAR LABRUM, INITIAL ENCOUNTER: Primary | ICD-10-CM

## 2023-08-30 PROCEDURE — 97110 THERAPEUTIC EXERCISES: CPT

## 2023-08-30 PROCEDURE — 97140 MANUAL THERAPY 1/> REGIONS: CPT

## 2023-08-30 NOTE — PROGRESS NOTES
Daily Note     Today's date: 2023  Patient name: Genna Avila  : 1965  MRN: [id number]  Referring provider: Tobias Ragland  Dx:   Encounter Diagnosis     ICD-10-CM    1. Tear of right acetabular labrum, initial encounter  S73.191A           Start Time: 1510  Stop Time: 1554  Total time in clinic (min): 44 minutes    Subjective: Patient arrived 10 minutes late. Patient stated that her hip is feeling good and has no pain. She stated that she feels as though her other leg is weaker now compared to her right side. Objective: See treatment diary below      Assessment: Tolerated treatment well. Patients continues to improve with muscular strength demonstrated by decreased rest breaks. Patient stated that she wants next visit to be her last visit due to improvements. Patient exhibited good technique with therapeutic exercises and would benefit from continued PT      Plan: Continue per plan of care.       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    • Spider veins of both lower extremities    • Umbilical hernia    • Umbilical hernia without obstruction and without gangrene 2020   • Wears glasses           PT 1:1 1510- 1540  Manuals          PROM IB IB IB IB                                                Neuro Re-Ed             TA Activation             SLR 2x10 2# 2x10 2#  Flex  2x10 2# Abd 2x10 3# flex  2x10 3# abd          SAQ 2x10 4# 2x10 4# 20x 5#          Balance             Adduction   2x10 2x10                                   Ther Ex             Hamstring Stretch 30"x3 30# 3x 30" 3x 30" 3x         ITB Stretch             Quad/rectus femoris Stretch 30" 3x 30" 3x  30" 3x         Clamshells 20x GTB  20x GTB 20x GTB         LAQ 2x10 5#            TRX Squats 20x 2x10 20x 20x         Side-stepping GTB 5 laps at stairs YTB 3 laps length of wall           Bike 5' 5'  5'         Leg Press    3x10 75#         Bridge Single Leg BL 2x10  Single Leg 2x10 Single leg 2x10 Sinlge leg 2x10         Step-ups  15x at stairs           Step-downs  15x at stairs           Lunges   2x10 2x10 2x10         Piriformis Stretch 30" 3x 30" 3x 30" 3x 30" 3x         Ther Activity                                       Gait Training                                       Modalities

## 2023-09-06 ENCOUNTER — OFFICE VISIT (OUTPATIENT)
Dept: PHYSICAL THERAPY | Facility: MEDICAL CENTER | Age: 58
End: 2023-09-06
Payer: COMMERCIAL

## 2023-09-06 DIAGNOSIS — S73.191A TEAR OF RIGHT ACETABULAR LABRUM, INITIAL ENCOUNTER: Primary | ICD-10-CM

## 2023-09-06 PROCEDURE — 97112 NEUROMUSCULAR REEDUCATION: CPT

## 2023-09-06 PROCEDURE — 97140 MANUAL THERAPY 1/> REGIONS: CPT

## 2023-09-06 PROCEDURE — 97110 THERAPEUTIC EXERCISES: CPT

## 2023-09-06 NOTE — PROGRESS NOTES
Daily Note     Today's date: 2023  Patient name: Jessica Masterson  : 1965  MRN: [id number]  Referring provider: Farhad Singh MD  Dx:   Encounter Diagnosis     ICD-10-CM    1. Tear of right acetabular labrum, initial encounter  S73.191A           Start Time: 161  Stop Time: 1709  Total time in clinic (min): 54 minutes    Subjective: Patient stated that her hip is feeling pretty good. She stated that she went a little faster this morning on the treadmill and it was feeling sore after, but not painful. Objective: See treatment diary below    R Hip ROM  WFL, no pain    R Hip Strength  Flex: 4+  Abd: 4+  Add: 4+  ER: 4-  IR: 4-    Assessment: Tolerated treatment well. Patient has made improvements in R hip pain, strength, and pain-free ROM. Patient noted that her hip has been feeling good and has not had any pain and feels as though she has progressed well with therapy. Patient exhibited good technique with therapeutic exercises and noted no increase in pain. Patient stated that she wanted today to be her last day for therapy. No pain throughout ROM and MMT testing. Patient given updated HEP to reflect progress with therapy, verbalized and demonstrated understanding.        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    • Spider veins of both lower extremities    • Umbilical hernia    • Umbilical hernia without obstruction and without gangrene 2020   • Wears glasses           PT 1:1 entire time  Manuals         PROM IB IB IB IB IB Neuro Re-Ed             TA Activation             SLR 2x10 2# 2x10 2#  Flex  2x10 2# Abd 2x10 3# flex  2x10 3# abd  15x        SAQ 2x10 4# 2x10 4# 20x 5#          Balance     SLS 30" 3x on foam        Adduction   2x10 2x10 15x                                  Ther Ex             Hamstring Stretch 30"x3 30# 3x 30" 3x 30" 3x 30" 3x        ITB Stretch             Quad/rectus femoris Stretch 30" 3x 30" 3x  30" 3x         Clamshells 20x GTB  20x GTB 20x GTB 20x BTB        LAQ 2x10 5#    15x 5#        TRX Squats 20x 2x10 20x 20x         Side-stepping GTB 5 laps at stairs YTB 3 laps length of wall   YTB 3 laps length of table        Bike 5' 5'  5' 5'        Leg Press    3x10 75# 2x10 75#        Bridge Single Leg BL 2x10  Single Leg 2x10 Single leg 2x10 Sinlge leg 2x10 20x Single leg        Step-ups  15x at stairs           Step-downs  15x at stairs           Lunges   2x10 2x10 2x10 15x        Piriformis Stretch 30" 3x 30" 3x 30" 3x 30" 3x         Ther Activity                                       Gait Training                                       Modalities

## 2023-09-11 ENCOUNTER — APPOINTMENT (OUTPATIENT)
Dept: PHYSICAL THERAPY | Facility: MEDICAL CENTER | Age: 58
End: 2023-09-11
Payer: COMMERCIAL

## 2023-10-03 ENCOUNTER — CLINICAL SUPPORT (OUTPATIENT)
Dept: FAMILY MEDICINE CLINIC | Facility: CLINIC | Age: 58
End: 2023-10-03
Payer: COMMERCIAL

## 2023-10-03 DIAGNOSIS — M81.0 AGE-RELATED OSTEOPOROSIS WITHOUT CURRENT PATHOLOGICAL FRACTURE: Primary | ICD-10-CM

## 2023-10-03 PROCEDURE — 96372 THER/PROPH/DIAG INJ SC/IM: CPT | Performed by: FAMILY MEDICINE

## 2023-10-31 ENCOUNTER — OFFICE VISIT (OUTPATIENT)
Dept: FAMILY MEDICINE CLINIC | Facility: CLINIC | Age: 58
End: 2023-10-31
Payer: COMMERCIAL

## 2023-10-31 VITALS
DIASTOLIC BLOOD PRESSURE: 80 MMHG | BODY MASS INDEX: 21.34 KG/M2 | OXYGEN SATURATION: 97 % | TEMPERATURE: 97.3 F | SYSTOLIC BLOOD PRESSURE: 104 MMHG | HEART RATE: 71 BPM | HEIGHT: 61 IN | WEIGHT: 113 LBS

## 2023-10-31 DIAGNOSIS — J06.9 URI WITH COUGH AND CONGESTION: Primary | ICD-10-CM

## 2023-10-31 PROCEDURE — 99213 OFFICE O/P EST LOW 20 MIN: CPT | Performed by: NURSE PRACTITIONER

## 2023-10-31 PROCEDURE — 87811 SARS-COV-2 COVID19 W/OPTIC: CPT | Performed by: NURSE PRACTITIONER

## 2023-10-31 RX ORDER — PREDNISONE 10 MG/1
10 TABLET ORAL 2 TIMES DAILY WITH MEALS
Qty: 6 TABLET | Refills: 0 | Status: SHIPPED | OUTPATIENT
Start: 2023-10-31 | End: 2023-11-03

## 2023-10-31 RX ORDER — AZITHROMYCIN 250 MG/1
TABLET, FILM COATED ORAL
Qty: 6 TABLET | Refills: 0 | Status: SHIPPED | OUTPATIENT
Start: 2023-10-31 | End: 2023-11-04

## 2023-10-31 RX ORDER — BENZONATATE 100 MG/1
100 CAPSULE ORAL 3 TIMES DAILY PRN
Qty: 60 CAPSULE | Refills: 0 | Status: SHIPPED | OUTPATIENT
Start: 2023-10-31

## 2023-10-31 NOTE — PROGRESS NOTES
Assessment/Plan:      Diagnoses and all orders for this visit:    URI with cough and congestion  -     POCT Rapid Covid Ag  -     azithromycin (Zithromax) 250 mg tablet; Take 2 tablets (500 mg total) by mouth daily for 1 day, THEN 1 tablet (250 mg total) daily for 4 days. -     predniSONE 10 mg tablet; Take 1 tablet (10 mg total) by mouth 2 (two) times a day with meals for 3 days  -     benzonatate (TESSALON PERLES) 100 mg capsule; Take 1 capsule (100 mg total) by mouth 3 (three) times a day as needed for cough          Subjective:     Patient ID: Braulio Gonzalez is a 59 y.o. female. Patient is here with a complaint of upper respiratory tract discomfort has had a cough runny nose and some nasal congestion. Denies any need nausea vomiting diarrhea chest pains or shortness of breath. All over-the-counter medication attempted arm were unsuccessful. Cough  Associated symptoms include a sore throat. Pertinent negatives include no chills or rhinorrhea. Review of Systems   Constitutional:  Negative for chills. HENT:  Positive for sore throat. Negative for congestion and rhinorrhea. Respiratory:  Positive for cough. Objective:     Physical Exam  Cardiovascular:      Rate and Rhythm: Normal rate and regular rhythm. Heart sounds: Normal heart sounds. Pulmonary:      Effort: Pulmonary effort is normal.      Breath sounds: Normal breath sounds. Neurological:      Mental Status: She is alert.

## 2023-11-01 LAB
SARS-COV-2 AG UPPER RESP QL IA: NEGATIVE
VALID CONTROL: NORMAL

## 2023-11-09 ENCOUNTER — OFFICE VISIT (OUTPATIENT)
Dept: URGENT CARE | Facility: CLINIC | Age: 58
End: 2023-11-09
Payer: COMMERCIAL

## 2023-11-09 ENCOUNTER — APPOINTMENT (OUTPATIENT)
Dept: RADIOLOGY | Facility: CLINIC | Age: 58
End: 2023-11-09
Payer: COMMERCIAL

## 2023-11-09 VITALS
TEMPERATURE: 99.2 F | HEART RATE: 96 BPM | DIASTOLIC BLOOD PRESSURE: 63 MMHG | SYSTOLIC BLOOD PRESSURE: 122 MMHG | RESPIRATION RATE: 16 BRPM | OXYGEN SATURATION: 96 %

## 2023-11-09 DIAGNOSIS — R05.1 ACUTE COUGH: ICD-10-CM

## 2023-11-09 DIAGNOSIS — J40 BRONCHITIS: Primary | ICD-10-CM

## 2023-11-09 LAB
SARS-COV-2 AG UPPER RESP QL IA: NEGATIVE
VALID CONTROL: NORMAL

## 2023-11-09 PROCEDURE — 99213 OFFICE O/P EST LOW 20 MIN: CPT | Performed by: PHYSICIAN ASSISTANT

## 2023-11-09 PROCEDURE — 71046 X-RAY EXAM CHEST 2 VIEWS: CPT

## 2023-11-09 PROCEDURE — 87811 SARS-COV-2 COVID19 W/OPTIC: CPT | Performed by: PHYSICIAN ASSISTANT

## 2023-11-09 RX ORDER — AMOXICILLIN AND CLAVULANATE POTASSIUM 875; 125 MG/1; MG/1
1 TABLET, FILM COATED ORAL EVERY 12 HOURS SCHEDULED
Qty: 14 TABLET | Refills: 0 | Status: SHIPPED | OUTPATIENT
Start: 2023-11-09 | End: 2023-11-16

## 2023-11-10 ENCOUNTER — TELEPHONE (OUTPATIENT)
Dept: URGENT CARE | Facility: CLINIC | Age: 58
End: 2023-11-10

## 2023-11-10 NOTE — TELEPHONE ENCOUNTER
Pt was called and given Xray findings. Plan to continue Augmentin as prescribed. No additional questions.

## 2023-11-15 ENCOUNTER — TELEPHONE (OUTPATIENT)
Age: 58
End: 2023-11-15

## 2023-11-15 NOTE — TELEPHONE ENCOUNTER
Pt said she saw Sophia Bradford on 10/31 for an URI. She was given medication and did not improve. She went to the u/c on Friday and was given more meds but she said she is still coughing. She said she has mucus is stuck in her throat and that is what is making her cough. She said the coughing is causing a headache also. She wants to know if there is anything else that can be sent in for her. Or does she need to come in? Pt uses [facility]. Please, advise.

## 2023-11-15 NOTE — TELEPHONE ENCOUNTER
Being treated with 2 separate courses of antibiotic confirms the symptoms are due to a virus she should use Mucinex DM there is no other medication that can be prescribed that will work as well as that medication. Also plenty of fluids will help to keep mucus secretions thin for easier clearing. No need for follow-up visit.

## 2023-11-21 ENCOUNTER — TELEPHONE (OUTPATIENT)
Dept: URGENT CARE | Facility: CLINIC | Age: 58
End: 2023-11-21

## 2023-11-21 NOTE — TELEPHONE ENCOUNTER
Patient called because she got a letter from radiology telling her to call regarding chest xray results. Results were already discussed with her the day after her visit but discussed the results again. Advised she follow up with her PCP but at this point no further action needed as appropriate treatment had been given at the time. She verbalized understanding and all questions were answered.

## 2023-11-22 ENCOUNTER — ANNUAL EXAM (OUTPATIENT)
Dept: OBGYN CLINIC | Facility: MEDICAL CENTER | Age: 58
End: 2023-11-22
Payer: COMMERCIAL

## 2023-11-22 VITALS
WEIGHT: 118 LBS | HEIGHT: 61 IN | BODY MASS INDEX: 22.28 KG/M2 | DIASTOLIC BLOOD PRESSURE: 60 MMHG | SYSTOLIC BLOOD PRESSURE: 104 MMHG

## 2023-11-22 DIAGNOSIS — Z01.419 ENCOUNTER FOR ANNUAL ROUTINE GYNECOLOGICAL EXAMINATION: Primary | ICD-10-CM

## 2023-11-22 DIAGNOSIS — N95.1 VAGINAL DRYNESS, MENOPAUSAL: ICD-10-CM

## 2023-11-22 PROBLEM — N92.6 IRREGULAR BLEEDING: Status: RESOLVED | Noted: 2019-05-31 | Resolved: 2023-11-22

## 2023-11-22 PROBLEM — L23.7 POISON IVY: Status: RESOLVED | Noted: 2020-07-29 | Resolved: 2023-11-22

## 2023-11-22 PROBLEM — Z90.49 STATUS POST LAPAROSCOPIC CHOLECYSTECTOMY: Status: RESOLVED | Noted: 2021-01-06 | Resolved: 2023-11-22

## 2023-11-22 PROBLEM — N93.0 POSTMENOPAUSAL POSTCOITAL BLEEDING: Status: RESOLVED | Noted: 2020-06-23 | Resolved: 2023-11-22

## 2023-11-22 PROBLEM — N39.44 BED WETTING: Status: RESOLVED | Noted: 2020-07-29 | Resolved: 2023-11-22

## 2023-11-22 PROBLEM — N95.0 POSTMENOPAUSAL POSTCOITAL BLEEDING: Status: RESOLVED | Noted: 2020-06-23 | Resolved: 2023-11-22

## 2023-11-22 PROCEDURE — S0612 ANNUAL GYNECOLOGICAL EXAMINA: HCPCS | Performed by: CLINICAL NURSE SPECIALIST

## 2023-11-22 RX ORDER — ESTRADIOL 0.1 MG/G
1 CREAM VAGINAL 2 TIMES WEEKLY
Qty: 42.5 G | Refills: 2 | Status: SHIPPED | OUTPATIENT
Start: 2023-11-23

## 2023-11-24 ENCOUNTER — APPOINTMENT (OUTPATIENT)
Dept: LAB | Facility: MEDICAL CENTER | Age: 58
End: 2023-11-24
Payer: COMMERCIAL

## 2023-11-24 DIAGNOSIS — Z13.220 SCREENING CHOLESTEROL LEVEL: ICD-10-CM

## 2023-11-24 DIAGNOSIS — R53.83 OTHER FATIGUE: ICD-10-CM
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
## 2023-11-24 LAB
ALBUMIN SERPL BCP-MCNC: 4 G/DL (ref 3.5–5)
ALP SERPL-CCNC: 46 U/L (ref 34–104)
ALT SERPL W P-5'-P-CCNC: 24 U/L (ref 7–52)
ANION GAP SERPL CALCULATED.3IONS-SCNC: 8 MMOL/L
AST SERPL W P-5'-P-CCNC: 35 U/L (ref 13–39)
B BURGDOR IGG+IGM SER QL IA: NEGATIVE
BACTERIA UR QL AUTO: ABNORMAL /HPF
BASOPHILS # BLD AUTO: 0.05 THOUSANDS/ÂΜL (ref 0–0.1)
BASOPHILS NFR BLD AUTO: 2 % (ref 0–1)
BILIRUB SERPL-MCNC: 1.61 MG/DL (ref 0.2–1)
BILIRUB UR QL STRIP: NEGATIVE
BUN SERPL-MCNC: 17 MG/DL (ref 5–25)
CALCIUM SERPL-MCNC: 8.5 MG/DL (ref 8.4–10.2)
CHLORIDE SERPL-SCNC: 103 MMOL/L (ref 96–108)
CHOLEST SERPL-MCNC: 190 MG/DL
CLARITY UR: CLEAR
CO2 SERPL-SCNC: 30 MMOL/L (ref 21–32)
COLOR UR: YELLOW
CREAT SERPL-MCNC: 0.66 MG/DL (ref 0.6–1.3)
EOSINOPHIL # BLD AUTO: 0.07 THOUSAND/ÂΜL (ref 0–0.61)
EOSINOPHIL NFR BLD AUTO: 2 % (ref 0–6)
ERYTHROCYTE [DISTWIDTH] IN BLOOD BY AUTOMATED COUNT: 13 % (ref 11.6–15.1)
GFR SERPL CREATININE-BSD FRML MDRD: 96 ML/MIN/1.73SQ M
GLUCOSE P FAST SERPL-MCNC: 75 MG/DL (ref 65–99)
GLUCOSE UR STRIP-MCNC: NEGATIVE MG/DL
HCT VFR BLD AUTO: 39.9 % (ref 34.8–46.1)
HDLC SERPL-MCNC: 66 MG/DL
HGB BLD-MCNC: 13.7 G/DL (ref 11.5–15.4)
HGB UR QL STRIP.AUTO: NEGATIVE
IMM GRANULOCYTES # BLD AUTO: 0 THOUSAND/UL (ref 0–0.2)
IMM GRANULOCYTES NFR BLD AUTO: 0 % (ref 0–2)
KETONES UR STRIP-MCNC: NEGATIVE MG/DL
LDLC SERPL CALC-MCNC: 115 MG/DL (ref 0–100)
LEUKOCYTE ESTERASE UR QL STRIP: ABNORMAL
LYMPHOCYTES # BLD AUTO: 1.45 THOUSANDS/ÂΜL (ref 0.6–4.47)
LYMPHOCYTES NFR BLD AUTO: 48 % (ref 14–44)
MAGNESIUM SERPL-MCNC: 2.2 MG/DL (ref 1.9–2.7)
MCH RBC QN AUTO: 32.9 PG (ref 26.8–34.3)
MCHC RBC AUTO-ENTMCNC: 34.3 G/DL (ref 31.4–37.4)
MCV RBC AUTO: 96 FL (ref 82–98)
MONOCYTES # BLD AUTO: 0.24 THOUSAND/ÂΜL (ref 0.17–1.22)
MONOCYTES NFR BLD AUTO: 8 % (ref 4–12)
MUCOUS THREADS UR QL AUTO: ABNORMAL
NEUTROPHILS # BLD AUTO: 1.18 THOUSANDS/ÂΜL (ref 1.85–7.62)
NEUTS SEG NFR BLD AUTO: 40 % (ref 43–75)
NITRITE UR QL STRIP: NEGATIVE
NON-SQ EPI CELLS URNS QL MICRO: ABNORMAL /HPF
NRBC BLD AUTO-RTO: 0 /100 WBCS
PH UR STRIP.AUTO: 6 [PH]
PLATELET # BLD AUTO: 274 THOUSANDS/UL (ref 149–390)
PMV BLD AUTO: 10.4 FL (ref 8.9–12.7)
POTASSIUM SERPL-SCNC: 3.3 MMOL/L (ref 3.5–5.3)
PROT SERPL-MCNC: 6.8 G/DL (ref 6.4–8.4)
PROT UR STRIP-MCNC: ABNORMAL MG/DL
RBC # BLD AUTO: 4.17 MILLION/UL (ref 3.81–5.12)
RBC #/AREA URNS AUTO: ABNORMAL /HPF
SODIUM SERPL-SCNC: 141 MMOL/L (ref 135–147)
SP GR UR STRIP.AUTO: 1.03 (ref 1–1.03)
TRIGL SERPL-MCNC: 47 MG/DL
TSH SERPL DL<=0.05 MIU/L-ACNC: 0.94 UIU/ML (ref 0.45–4.5)
URATE SERPL-MCNC: 3.7 MG/DL (ref 2–7.5)
UROBILINOGEN UR STRIP-ACNC: <2 MG/DL
WBC # BLD AUTO: 2.99 THOUSAND/UL (ref 4.31–10.16)
WBC #/AREA URNS AUTO: ABNORMAL /HPF

## 2023-11-24 PROCEDURE — 86618 LYME DISEASE ANTIBODY: CPT

## 2023-11-24 PROCEDURE — 84550 ASSAY OF BLOOD/URIC ACID: CPT

## 2023-11-24 PROCEDURE — 84443 ASSAY THYROID STIM HORMONE: CPT

## 2023-11-24 PROCEDURE — 80061 LIPID PANEL: CPT

## 2023-11-24 PROCEDURE — 83735 ASSAY OF MAGNESIUM: CPT

## 2023-11-24 PROCEDURE — 36415 COLL VENOUS BLD VENIPUNCTURE: CPT

## 2023-11-24 PROCEDURE — 80053 COMPREHEN METABOLIC PANEL: CPT

## 2023-11-24 PROCEDURE — 85025 COMPLETE CBC W/AUTO DIFF WBC: CPT

## 2023-11-25 DIAGNOSIS — N32.81 OAB (OVERACTIVE BLADDER): ICD-10-CM

## 2023-11-27 RX ORDER — OXYBUTYNIN CHLORIDE 15 MG/1
15 TABLET, EXTENDED RELEASE ORAL
Qty: 90 TABLET | Refills: 1 | Status: SHIPPED | OUTPATIENT
Start: 2023-11-27

## 2023-12-05 ENCOUNTER — APPOINTMENT (OUTPATIENT)
Dept: LAB | Facility: MEDICAL CENTER | Age: 58
End: 2023-12-05
Payer: COMMERCIAL

## 2023-12-05 ENCOUNTER — OFFICE VISIT (OUTPATIENT)
Dept: FAMILY MEDICINE CLINIC | Facility: CLINIC | Age: 58
End: 2023-12-05
Payer: COMMERCIAL

## 2023-12-05 ENCOUNTER — APPOINTMENT (OUTPATIENT)
Dept: RADIOLOGY | Facility: MEDICAL CENTER | Age: 58
End: 2023-12-05
Payer: COMMERCIAL

## 2023-12-05 VITALS
HEIGHT: 61 IN | RESPIRATION RATE: 14 BRPM | DIASTOLIC BLOOD PRESSURE: 60 MMHG | TEMPERATURE: 97.4 F | BODY MASS INDEX: 21.14 KG/M2 | WEIGHT: 112 LBS | HEART RATE: 58 BPM | SYSTOLIC BLOOD PRESSURE: 110 MMHG | OXYGEN SATURATION: 97 %

## 2023-12-05 DIAGNOSIS — R17 SERUM TOTAL BILIRUBIN ELEVATED: ICD-10-CM

## 2023-12-05 DIAGNOSIS — R17 JAUNDICE: ICD-10-CM

## 2023-12-05 DIAGNOSIS — R17 ICTERUS: ICD-10-CM

## 2023-12-05 DIAGNOSIS — J18.9 PNEUMONIA OF BOTH LOWER LOBES DUE TO INFECTIOUS ORGANISM: Primary | ICD-10-CM

## 2023-12-05 DIAGNOSIS — J18.9 PNEUMONIA OF BOTH LOWER LOBES DUE TO INFECTIOUS ORGANISM: ICD-10-CM

## 2023-12-05 LAB
ALBUMIN SERPL BCP-MCNC: 4.4 G/DL (ref 3.5–5)
ALP SERPL-CCNC: 44 U/L (ref 34–104)
ALT SERPL W P-5'-P-CCNC: 26 U/L (ref 7–52)
AST SERPL W P-5'-P-CCNC: 37 U/L (ref 13–39)
BILIRUB DIRECT SERPL-MCNC: 0.14 MG/DL (ref 0–0.2)
BILIRUB SERPL-MCNC: 0.87 MG/DL (ref 0.2–1)
HAV IGM SER QL: NORMAL
HBV CORE IGM SER QL: NORMAL
HBV SURFACE AG SER QL: NORMAL
HCV AB SER QL: NORMAL
PROT SERPL-MCNC: 7 G/DL (ref 6.4–8.4)
SL AMB  POCT GLUCOSE, UA: NORMAL
SL AMB LEUKOCYTE ESTERASE,UA: NORMAL
SL AMB POCT BILIRUBIN,UA: NORMAL
SL AMB POCT BLOOD,UA: NORMAL
SL AMB POCT CLARITY,UA: YELLOW
SL AMB POCT COLOR,UA: NORMAL
SL AMB POCT KETONES,UA: NORMAL
SL AMB POCT NITRITE,UA: NORMAL
SL AMB POCT PH,UA: 7
SL AMB POCT SPECIFIC GRAVITY,UA: 1
SL AMB POCT URINE PROTEIN: NORMAL
SL AMB POCT UROBILINOGEN: 0.2

## 2023-12-05 PROCEDURE — 80074 ACUTE HEPATITIS PANEL: CPT

## 2023-12-05 PROCEDURE — 81002 URINALYSIS NONAUTO W/O SCOPE: CPT | Performed by: NURSE PRACTITIONER

## 2023-12-05 PROCEDURE — 36415 COLL VENOUS BLD VENIPUNCTURE: CPT

## 2023-12-05 PROCEDURE — 80076 HEPATIC FUNCTION PANEL: CPT

## 2023-12-05 PROCEDURE — 99214 OFFICE O/P EST MOD 30 MIN: CPT | Performed by: NURSE PRACTITIONER

## 2023-12-05 PROCEDURE — 71046 X-RAY EXAM CHEST 2 VIEWS: CPT

## 2023-12-05 NOTE — PROGRESS NOTES
Assessment/Plan:      Diagnoses and all orders for this visit:    Pneumonia of both lower lobes due to infectious organism  -     XR chest pa & lateral; Future    Serum total bilirubin elevated  -     Hepatic function panel; Future  -     Hepatitis panel, acute; Future  -     POCT urine dip    Icterus  -     Hepatic function panel; Future  -     Hepatitis panel, acute; Future    Jaundice  -     Hepatic function panel; Future  -     Hepatitis panel, acute; Future        Results of the recent pneumonia chest x-ray were reviewed and was ordered wet read was however should be noted that the jaundice. Patient has no abdominal pain nausea vomiting is negative will order routine labs hepatic function and we will contact her with results when available. Ultrasound testing of the liver and pancreas will also be ordered and she is to complete that at her earliest convenience. Testing for the jaundice is abnormal are not a normal finding must rule out subsequent first before approaching any kind of dietary supplements causing her problems. Pt verbalized understanding of this. Subjective:     Patient ID: Jody Robles is a 59 y.o. female. Patient is here for routine follow-up. To review most recent labs. To also discuss current state of health and any new problems that they may be experiencing. Patient states that medications taken as prescribed and very well tolerated no new complaints at this time. Review of Systems   Constitutional:  Positive for fatigue. Negative for chills. HENT:  Negative for congestion, rhinorrhea and sore throat. Respiratory:  Positive for cough. Objective:     Physical Exam  HENT:      Mouth/Throat:      Pharynx: Oropharynx is clear. Cardiovascular:      Rate and Rhythm: Normal rate and regular rhythm. Heart sounds: Normal heart sounds. Pulmonary:      Effort: Pulmonary effort is normal.      Breath sounds: Normal breath sounds.    Skin:     Coloration: Skin is jaundiced. Neurological:      Mental Status: She is alert.

## 2023-12-06 DIAGNOSIS — R17 JAUNDICE: Primary | ICD-10-CM

## 2023-12-10 ENCOUNTER — HOSPITAL ENCOUNTER (OUTPATIENT)
Dept: ULTRASOUND IMAGING | Facility: HOSPITAL | Age: 58
Discharge: HOME | End: 2023-12-10
Payer: COMMERCIAL

## 2023-12-10 DIAGNOSIS — R17 JAUNDICE: ICD-10-CM

## 2023-12-10 PROCEDURE — 76700 US EXAM ABDOM COMPLETE: CPT

## 2023-12-11 ENCOUNTER — TELEPHONE (OUTPATIENT)
Age: 58
End: 2023-12-11

## 2023-12-21 ENCOUNTER — TELEPHONE (OUTPATIENT)
Age: 58
End: 2023-12-21

## 2023-12-21 NOTE — TELEPHONE ENCOUNTER
Pt returned our call. Providers result note given. Pt aware and understands. Pt still experiences acid reflux and bloating when eating. Please advise.

## 2023-12-26 ENCOUNTER — HOSPITAL ENCOUNTER (OUTPATIENT)
Dept: RADIOLOGY | Age: 58
Discharge: HOME | End: 2023-12-26
Payer: COMMERCIAL

## 2023-12-26 ENCOUNTER — OFFICE VISIT (OUTPATIENT)
Dept: GASTROENTEROLOGY | Facility: CLINIC | Age: 58
End: 2023-12-26
Payer: COMMERCIAL

## 2023-12-26 ENCOUNTER — TELEPHONE (OUTPATIENT)
Age: 58
End: 2023-12-26

## 2023-12-26 ENCOUNTER — LAB (OUTPATIENT)
Dept: LAB | Age: 58
End: 2023-12-26
Payer: COMMERCIAL

## 2023-12-26 VITALS
DIASTOLIC BLOOD PRESSURE: 60 MMHG | WEIGHT: 114 LBS | SYSTOLIC BLOOD PRESSURE: 99 MMHG | HEIGHT: 61 IN | HEART RATE: 71 BPM | BODY MASS INDEX: 21.52 KG/M2

## 2023-12-26 DIAGNOSIS — K21.9 GASTROESOPHAGEAL REFLUX DISEASE WITHOUT ESOPHAGITIS: Primary | ICD-10-CM

## 2023-12-26 DIAGNOSIS — Z12.31 ENCOUNTER FOR SCREENING MAMMOGRAM FOR MALIGNANT NEOPLASM OF BREAST: ICD-10-CM

## 2023-12-26 DIAGNOSIS — Z12.11 COLON CANCER SCREENING: ICD-10-CM

## 2023-12-26 DIAGNOSIS — R10.13 EPIGASTRIC PAIN: ICD-10-CM

## 2023-12-26 DIAGNOSIS — E80.4 GILBERT SYNDROME: ICD-10-CM

## 2023-12-26 DIAGNOSIS — R07.82 INTERCOSTAL PAIN: ICD-10-CM

## 2023-12-26 LAB
BILIRUB DIRECT SERPL-MCNC: 0.16 MG/DL (ref 0–0.2)
BILIRUB SERPL-MCNC: 0.83 MG/DL (ref 0.2–1)

## 2023-12-26 PROCEDURE — 36415 COLL VENOUS BLD VENIPUNCTURE: CPT

## 2023-12-26 PROCEDURE — 77067 SCR MAMMO BI INCL CAD: CPT

## 2023-12-26 PROCEDURE — 82247 BILIRUBIN TOTAL: CPT

## 2023-12-26 PROCEDURE — 77063 BREAST TOMOSYNTHESIS BI: CPT

## 2023-12-26 PROCEDURE — 82248 BILIRUBIN DIRECT: CPT

## 2023-12-26 PROCEDURE — 99214 OFFICE O/P EST MOD 30 MIN: CPT | Performed by: INTERNAL MEDICINE

## 2023-12-26 NOTE — PROGRESS NOTES
[facility] - Outpatient Follow-up Note  Ariela Colmenares 58 y.o. female MRN: [id number]  Encounter: [id number]          ASSESSMENT AND PLAN:      1. Gastroesophageal reflux disease without esophagitis  Controlled with famotidine and pantoprazole had surveillance endoscopy earlier this year    2. Epigastric pain  Longstanding after gallbladder was removed possibly adhesive disease  - CT chest abdomen pelvis w contrast; Future    3. Intercostal pain  Longstanding comes and goes  - CT chest abdomen pelvis w contrast; Future    4. Colon cancer screening  Last colonoscopy 2020 never had polyps no family history of colon cancer next colonoscopy 2030    5. Gilbert syndrome  Will get total bilirubin and direct bilirubin.  Had recent ultrasound.  She will otherwise follow-up in 4 months  - Bilirubin, Total and Direct; Future    ______________________________________________________________________    SUBJECTIVE: Very pleasant 58-year-old lady who we see for gastroesophageal reflux epigastric pain and intercostal pain colon cancer screening.  Recently came to her attention that she had elevated total bilirubin.  We discussed Gilbert's syndrome.  She is swallowing well weight is stable no melena or bright red blood per rectum.      REVIEW OF SYSTEMS IS OTHERWISE NEGATIVE.      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     Spider veins of both lower extremities     Umbilical hernia     Umbilical hernia without obstruction and without gangrene 08/11/2020    Wears glasses      Past Surgical History:   Procedure  Laterality Date    AUGMENTATION MAMMAPLASTY Bilateral 2014    retro pec saline    CHOLECYSTECTOMY      COLONOSCOPY  2014    EGD      HERNIA REPAIR      ID CMBND ANTERPOST COLPORRAPHY W/CYSTO N/A 12/22/2020    Procedure: ANT POSTCOLPORRHAPHY;  Surgeon: Vasile Cardoza MD;  Location: [facility];  Service: UroGynecology           ID CYSTOURETHROSCOPY N/A 12/22/2020    Procedure: CYSTOSCOPY;  Surgeon: Vasile Cardoza MD;  Location: [facility];  Service: UroGynecology           ID LAPAROSCOPY SURG CHOLECYSTECTOMY N/A 12/22/2020    Procedure: LAP CARLOTTA;  Surgeon: Enrico Espinoza MD;  Location: [facility];  Service: General    ID RPR UMBILICAL HRNA 5 YRS/> REDUCIBLE N/A 12/22/2020    Procedure: REPAIR HERNIA UMBILICAL;  Surgeon: Enrico Espinoza MD;  Location: [facility];  Service: General    ID SLING OPERATION STRESS INCONTINENCE N/A 12/22/2020    Procedure: PUBOVAGINAL SLING;  Surgeon: Vasile Cardoza MD;  Location: [facility];  Service: UroGynecology           UPPER GASTROINTESTINAL ENDOSCOPY       Social History   Social History     Substance and Sexual Activity   Alcohol Use No     Social History     Substance and Sexual Activity   Drug Use Never     Social History     Tobacco Use   Smoking Status Never   Smokeless Tobacco Never     Family History   Problem Relation Age of Onset    Hypertension Mother     Diabetes Father     No Known Problems Sister     No Known Problems Sister     No Known Problems Sister     No Known Problems Sister     Other Son         lyme disease       Meds/Allergies       Current Outpatient Medications:     Ascorbic Acid (VITAMIN C PO)    Bilberry, Vaccinium myrtillus, (BILBERRY EXTRACT PO)    BIOTIN PO    Calcium Carbonate-Vitamin D (CALCIUM-D PO)    COLLAGEN PO    Cyanocobalamin (VITAMIN B-12 PO)    estradiol (ESTRACE) 0.1 mg/g vaginal cream    famotidine (PEPCID) 40 MG tablet    GLUCOSAMINE-CHONDROITIN PO    Misc Natural Products (TART CHERRY ADVANCED PO)    Misc Natural Products (Turmeric  "Curcumin) CAPS    Multiple Vitamin (DAILY VITAMIN) tablet    Multiple Vitamins-Minerals (HAIR VITAMINS PO)    NON FORMULARY    oxybutynin (DITROPAN XL) 15 MG 24 hr tablet    pantoprazole (PROTONIX) 40 mg tablet    Pomegranate, Punica granatum, (POMEGRANATE PO)    Probiotic Product (PROBIOTIC DAILY PO)    Pyridoxine HCl (VITAMIN B6 PO)    Red Yeast Rice Extract (RED YEAST RICE PO)    triamcinolone (KENALOG) 0.5 % cream    benzonatate (TESSALON PERLES) 100 mg capsule    No Known Allergies        Objective     Blood pressure 99/60, pulse 71, height 5' 1\" (1.549 m), weight 51.7 kg (114 lb), not currently breastfeeding. Body mass index is 21.54 kg/m².      PHYSICAL EXAM:      General Appearance:   Alert, cooperative, no distress   HEENT:   Normocephalic, atraumatic, anicteric.     Neck:  Supple, symmetrical, trachea midline   Lungs:   Clear to auscultation bilaterally; no rales, rhonchi or wheezing; respirations unlabored    Heart::   Regular rate and rhythm; no murmur, rub, or gallop.   Abdomen:   Soft, non-tender, non-distended; normal bowel sounds; no masses, no organomegaly    Genitalia:   Deferred    Rectal:   Deferred    Extremities:  No cyanosis, clubbing or edema    Pulses:  2+ and symmetric    Skin:  No jaundice, rashes, or lesions    Lymph nodes:  No palpable cervical lymphadenopathy        Lab Results:   No visits with results within 1 Day(s) from this visit.   Latest known visit with results is:   Appointment on 12/05/2023   Component Date Value    Total Bilirubin 12/05/2023 0.87     Bilirubin, Direct 12/05/2023 0.14     Alkaline Phosphatase 12/05/2023 44     AST 12/05/2023 37     ALT 12/05/2023 26     Total Protein 12/05/2023 7.0     Albumin 12/05/2023 4.4     Hepatitis B Surface Ag 12/05/2023 Non-reactive     Hep A IgM 12/05/2023 Non-reactive     Hepatitis C Ab 12/05/2023 Non-reactive     Hep B C IgM 12/05/2023 Non-reactive          Radiology Results:   US abdomen complete    Result Date: " 12/15/2023  Narrative: ABDOMEN ULTRASOUND, COMPLETE INDICATION:   R17: Unspecified jaundice. COMPARISON:  None TECHNIQUE:   Real-time ultrasound of the abdomen was performed with a curvilinear transducer with both volumetric sweeps and still imaging techniques. FINDINGS: PANCREAS:  Visualized portions of the pancreas are within normal limits. AORTA AND IVC:  Visualized portions are normal for patient age. LIVER: Size:  Within normal range.  The liver measures 11.8 cm in the midclavicular line. Contour:  Surface contour is smooth. Parenchyma:  Echogenicity and echotexture are within normal limits. No liver mass identified. Limited imaging of the main portal vein shows it to be patent and flowing in the normal direction, PSV 25 cm/s. BILIARY: Patient has undergone cholecystectomy. No intrahepatic biliary dilatation. CBD measures 2.0 mm. No choledocholithiasis. KIDNEY: Right kidney measures 8.5 x 5.4 x 5.4  cm. Volume 129.5 mL Kidney within normal limits. Left kidney measures 9.3 x 4.9 x 4.7 cm. Volume 112.1 mL Kidney within normal limits. SPLEEN: Measures 6.7 x 6.2 x 2.0 cm. Volume 43.7 mL Grossly within normal limits but difficult to obtain imaging due to poor acoustic windows and overlying bowel gas. ASCITES:  None.     Impression: Normal. Workstation performed: [id number]     XR chest pa & lateral    Result Date: 12/9/2023  Narrative: CHEST INDICATION:   Pneumonia, unspecified organism. COMPARISON:  Comparison made with previous examination(s) dated (DX) 09-Nov-2023. EXAM PERFORMED/VIEWS:  XR CHEST PA & LATERAL Images: 2 FINDINGS: Cardiomediastinal silhouette appears unremarkable. The lungs are clear.  No pneumothorax or pleural effusion. Osseous structures appear within normal limits for patient age.     Impression: No acute cardiopulmonary disease. Electronically signed: 12/09/2023 01:58 PM Michael Burnette MD

## 2023-12-26 NOTE — TELEPHONE ENCOUNTER
Patients GI provider:  Dr. King    Number to return call: [phone]    Reason for call: Pt calling because she has scheduled her CT, ordered this morning, for Friday and the  told her to call to let us know so we can send it to her insurance for approval since it is so soon.     Scheduled procedure/appointment date if applicable: 12.29.23

## 2023-12-29 ENCOUNTER — HOSPITAL ENCOUNTER (OUTPATIENT)
Dept: RADIOLOGY | Facility: MEDICAL CENTER | Age: 58
Discharge: HOME | End: 2023-12-29
Payer: COMMERCIAL

## 2023-12-29 DIAGNOSIS — R07.82 INTERCOSTAL PAIN: ICD-10-CM

## 2023-12-29 DIAGNOSIS — R10.13 EPIGASTRIC PAIN: ICD-10-CM

## 2023-12-29 PROCEDURE — G1004 CDSM NDSC: HCPCS

## 2023-12-29 PROCEDURE — 74177 CT ABD & PELVIS W/CONTRAST: CPT

## 2023-12-29 PROCEDURE — 71260 CT THORAX DX C+: CPT

## 2023-12-29 RX ADMIN — IOHEXOL 75 ML: 350 INJECTION, SOLUTION INTRAVENOUS at 09:24

## 2024-02-21 PROBLEM — Z01.419 ENCOUNTER FOR GYNECOLOGICAL EXAMINATION (GENERAL) (ROUTINE) WITHOUT ABNORMAL FINDINGS: Status: RESOLVED | Noted: 2020-06-23 | Resolved: 2024-02-21

## 2024-02-27 ENCOUNTER — OFFICE VISIT (OUTPATIENT)
Dept: FAMILY MEDICINE CLINIC | Facility: CLINIC | Age: 59
End: 2024-02-27
Payer: COMMERCIAL

## 2024-02-27 VITALS
RESPIRATION RATE: 18 BRPM | SYSTOLIC BLOOD PRESSURE: 112 MMHG | BODY MASS INDEX: 21.71 KG/M2 | HEART RATE: 74 BPM | WEIGHT: 115 LBS | DIASTOLIC BLOOD PRESSURE: 78 MMHG | HEIGHT: 61 IN | OXYGEN SATURATION: 99 % | TEMPERATURE: 98.2 F

## 2024-02-27 DIAGNOSIS — R00.2 PALPITATIONS: Primary | ICD-10-CM

## 2024-02-27 PROCEDURE — 99213 OFFICE O/P EST LOW 20 MIN: CPT | Performed by: NURSE PRACTITIONER

## 2024-02-29 NOTE — PROGRESS NOTES
Assessment/Plan:      Diagnoses and all orders for this visit:    Palpitations  -     Holter monitor; Future        Physical assessment unremarkable.  This is an ongoing problem for patient no current palpitations.  But patient is concerned did advise that palpitations warrant a Holter monitor order was given they will contact her with the time the date for placement I will contact her with results when available.  Cardiology referral was offered will proceed upon results of the Holter monitor.  Subjective:     Patient ID: Ariela Colmenares is a 58 y.o. female.    Is here having an occasional palpitation ongoing lasting more than 90 seconds would like to know if there is a problem.  She denies any nausea vomiting diarrhea chest pains or shortness of breath.    Palpitations  Pertinent negatives include no abdominal pain, arthralgias, chest pain, fever, myalgias or sore throat.       Review of Systems   Constitutional:  Negative for appetite change and fever.   HENT:  Negative for sinus pressure and sore throat.    Eyes:  Negative for pain.   Respiratory:  Negative for shortness of breath.    Cardiovascular:  Positive for palpitations. Negative for chest pain.   Gastrointestinal:  Negative for abdominal pain.   Genitourinary:  Negative for dysuria.   Musculoskeletal:  Negative for arthralgias and myalgias.   Skin:  Negative for color change.   Neurological:  Negative for light-headedness.   Psychiatric/Behavioral:  Negative for behavioral problems.          Objective:     Physical Exam  Constitutional:       Appearance: Normal appearance.   Cardiovascular:      Rate and Rhythm: Normal rate and regular rhythm.      Heart sounds: Normal heart sounds. No murmur heard.     No gallop.   Pulmonary:      Effort: Pulmonary effort is normal.      Breath sounds: Normal breath sounds.   Neurological:      General: No focal deficit present.      Mental Status: She is alert and oriented to person, place, and time.

## 2024-03-28 ENCOUNTER — HOSPITAL ENCOUNTER (OUTPATIENT)
Dept: NON INVASIVE DIAGNOSTICS | Facility: CLINIC | Age: 59
Discharge: HOME | End: 2024-03-28
Payer: COMMERCIAL

## 2024-03-28 DIAGNOSIS — R00.2 PALPITATIONS: ICD-10-CM

## 2024-03-28 PROCEDURE — 93226 XTRNL ECG REC<48 HR SCAN A/R: CPT

## 2024-03-28 PROCEDURE — 93225 XTRNL ECG REC<48 HRS REC: CPT

## 2024-04-02 PROCEDURE — 93227 XTRNL ECG REC<48 HR R&I: CPT | Performed by: INTERNAL MEDICINE

## 2024-04-03 PROBLEM — M81.0 AGE-RELATED OSTEOPOROSIS WITHOUT CURRENT PATHOLOGICAL FRACTURE: Status: ACTIVE | Noted: 2024-04-03

## 2024-04-04 ENCOUNTER — OFFICE VISIT (OUTPATIENT)
Dept: FAMILY MEDICINE CLINIC | Facility: CLINIC | Age: 59
End: 2024-04-04
Payer: COMMERCIAL

## 2024-04-04 VITALS
WEIGHT: 114.2 LBS | DIASTOLIC BLOOD PRESSURE: 68 MMHG | OXYGEN SATURATION: 97 % | TEMPERATURE: 98.3 F | SYSTOLIC BLOOD PRESSURE: 116 MMHG | RESPIRATION RATE: 16 BRPM | HEART RATE: 66 BPM | HEIGHT: 61 IN | BODY MASS INDEX: 21.56 KG/M2

## 2024-04-04 DIAGNOSIS — R53.83 OTHER FATIGUE: ICD-10-CM

## 2024-04-04 DIAGNOSIS — Z13.220 SCREENING CHOLESTEROL LEVEL: ICD-10-CM

## 2024-04-04 DIAGNOSIS — K21.9 GASTROESOPHAGEAL REFLUX DISEASE WITHOUT ESOPHAGITIS: ICD-10-CM

## 2024-04-04 DIAGNOSIS — M81.0 AGE-RELATED OSTEOPOROSIS WITHOUT CURRENT PATHOLOGICAL FRACTURE: ICD-10-CM

## 2024-04-04 DIAGNOSIS — R10.13 EPIGASTRIC PAIN: ICD-10-CM

## 2024-04-04 DIAGNOSIS — L30.9 DERMATITIS: ICD-10-CM

## 2024-04-04 DIAGNOSIS — G47.09 OTHER INSOMNIA: Primary | ICD-10-CM

## 2024-04-04 DIAGNOSIS — R19.7 DIARRHEA, UNSPECIFIED TYPE: ICD-10-CM

## 2024-04-04 DIAGNOSIS — B99.9 INFECTION: ICD-10-CM

## 2024-04-04 DIAGNOSIS — K21.00 GASTROESOPHAGEAL REFLUX DISEASE WITH ESOPHAGITIS WITHOUT HEMORRHAGE: ICD-10-CM

## 2024-04-04 PROCEDURE — 99214 OFFICE O/P EST MOD 30 MIN: CPT | Performed by: FAMILY MEDICINE

## 2024-04-04 PROCEDURE — 96372 THER/PROPH/DIAG INJ SC/IM: CPT | Performed by: FAMILY MEDICINE

## 2024-04-04 RX ORDER — DIPHENOXYLATE HYDROCHLORIDE AND ATROPINE SULFATE 2.5; .025 MG/1; MG/1
1 TABLET ORAL 4 TIMES DAILY PRN
Qty: 20 TABLET | Refills: 0 | Status: SHIPPED | OUTPATIENT
Start: 2024-04-04

## 2024-04-04 RX ORDER — FAMOTIDINE 40 MG/1
TABLET, FILM COATED ORAL
Qty: 180 TABLET | Refills: 1 | Status: SHIPPED | OUTPATIENT
Start: 2024-04-04

## 2024-04-04 RX ORDER — CIPROFLOXACIN 500 MG/1
500 TABLET, FILM COATED ORAL EVERY 12 HOURS SCHEDULED
Qty: 20 TABLET | Refills: 0 | Status: SHIPPED | OUTPATIENT
Start: 2024-04-04 | End: 2024-04-14

## 2024-04-04 RX ORDER — TRIAMCINOLONE ACETONIDE 5 MG/G
CREAM TOPICAL 3 TIMES DAILY
Qty: 30 G | Refills: 2 | Status: SHIPPED | OUTPATIENT
Start: 2024-04-04

## 2024-04-04 RX ORDER — HYDROCORTISONE 25 MG/ML
LOTION TOPICAL 2 TIMES DAILY
Qty: 118 ML | Refills: 3 | Status: SHIPPED | OUTPATIENT
Start: 2024-04-04

## 2024-04-04 NOTE — PROGRESS NOTES
Name: Ariela Colmenares      : 1965      MRN: [id number]  Encounter Provider: Bishnu So MD  Encounter Date: 2024   Encounter department: [facility]    Assessment & Plan     1. Other insomnia  Assessment & Plan:  Discussed with patient use of sedative  medications and good  sleep hygiene to maximize treatment for this problem. Pt  to use sedatives only prior to sleep and cautioned them about usage at any other time. All patient questions about this problem answered today.       2. Gastroesophageal reflux disease with esophagitis without hemorrhage  Assessment & Plan:  Patient to continue with present therapy and decrease caffeine, avoid ETOH and smoking to decrease acid production. Pt should also cease eating prior to bedtime and avoid excessive fluid intake prior to sleep. May use antacids as needed for breakthrough GERD. All pateint questions answered today about this condition.       3. Age-related osteoporosis without current pathological fracture  Assessment & Plan:  Patient to continue with weight bearing exercises as much as possible and oral intake of 1200 mg of calcium with 800 IU of Vit D to maximize bone protection. Pt  to continue  with DEXA scan every 2 years to reassess. All qustions regarding this problem answered today to patient satisfaction.     Orders:  -     denosumab (PROLIA) subcutaneous injection 60 mg    4. Epigastric pain  -     famotidine (PEPCID) 40 MG tablet; 1 tablet by mouth twice a day    5. Gastroesophageal reflux disease without esophagitis  -     famotidine (PEPCID) 40 MG tablet; 1 tablet by mouth twice a day    6. Dermatitis  -     hydrocortisone 2.5 % lotion; Apply topically 2 (two) times a day  -     triamcinolone (KENALOG) 0.5 % cream; Apply topically 3 (three) times a day    7. Diarrhea, unspecified type  -     diphenoxylate-atropine (LOMOTIL) 2.5-0.025 mg per tablet; Take 1 tablet by mouth 4 (four) times a day as needed for  diarrhea    8. Infection  -     ciprofloxacin (CIPRO) 500 mg tablet; Take 1 tablet (500 mg total) by mouth every 12 (twelve) hours for 10 days    9. Other fatigue  -     Comprehensive metabolic panel; Future  -     CBC and differential; Future  -     TSH, 3rd generation with Free T4 reflex; Future  -     Magnesium; Future  -     Uric acid; Future  -     UA/M w/rflx Culture, Comp    10. Screening cholesterol level  -     Lipid Panel with Direct LDL reflex; Future           Subjective     58-year-old female today for checkup on multimedical problems patient with some GERD also with overactive bladder and osteoporosis as she is here for her Prolia shot which we will do for her today.  Patient also is having some loose stool and is asking for some medicine for some diarrhea we will give her a short course of some Lomotil if this does not improve we want to see her back.  Patient also is looking for some triamcinolone cream for the upcoming poison ivy season this patient goes out and works outside on a regular basis throughout the summer months and gets poison ivy on a regular basis and we will see about getting her some triamcinolone cream for her to use when she needs it she also is asking for some hydrocortisone lotion and we will get her some of that also to have on hand.      Review of Systems   Constitutional:  Negative for activity change, appetite change, fatigue and fever.   HENT:  Negative for congestion, ear pain, postnasal drip, rhinorrhea, sinus pressure, sinus pain, sneezing and sore throat.    Eyes:  Negative for pain and redness.   Respiratory:  Negative for apnea, cough, chest tightness, shortness of breath and wheezing.    Cardiovascular:  Negative for chest pain, palpitations and leg swelling.   Gastrointestinal:  Negative for abdominal pain, constipation, diarrhea, nausea and vomiting.   Endocrine: Negative for cold intolerance and heat intolerance.   Genitourinary:  Negative for difficulty urinating,  dysuria, frequency, hematuria and urgency.   Musculoskeletal:  Negative for arthralgias, back pain, gait problem and myalgias.   Skin:  Negative for rash.   Neurological:  Negative for dizziness, speech difficulty, weakness, numbness and headaches.   Hematological:  Does not bruise/bleed easily.   Psychiatric/Behavioral:  Negative for agitation, confusion and hallucinations.        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    • Spider veins of both lower extremities    • Umbilical hernia    • Umbilical hernia without obstruction and without gangrene 08/11/2020   • Wears glasses      Past Surgical History:   Procedure Laterality Date   • AUGMENTATION MAMMAPLASTY Bilateral 2014    retro pec saline   • CHOLECYSTECTOMY     • COLONOSCOPY  2014   • EGD     • HERNIA REPAIR     • IL CMBND ANTERPOST COLPORRAPHY W/CYSTO N/A 12/22/2020    Procedure: ANT POSTCOLPORRHAPHY;  Surgeon: Vasile Cardoza MD;  Location: [facility];  Service: UroGynecology          • IL CYSTOURETHROSCOPY N/A 12/22/2020    Procedure: CYSTOSCOPY;  Surgeon: Vasile Cardoza MD;  [facility];  Service: UroGynecology          • IL LAPAROSCOPY SURG CHOLECYSTECTOMY N/A 12/22/2020    Procedure: LAP CARLOTTA;  Surgeon: Enrico Espinoza MD;  Location[facility];  Service: General   • IL RPR UMBILICAL HRNA 5 YRS/> REDUCIBLE N/A 12/22/2020    Procedure: REPAIR HERNIA UMBILICAL;  Surgeon: Enrico Espinoza MD;  [facility];  Service: General   • IL SLING OPERATION STRESS INCONTINENCE N/A 12/22/2020    Procedure: PUBOVAGINAL SLING;  Surgeon: Vasile Cardoza MD;  Location: [facility];  Service: UroGynecology          • UPPER GASTROINTESTINAL ENDOSCOPY       Family History   Problem Relation Age of Onset   • Hypertension Mother    • Diabetes Father    • No Known Problems Sister    • No Known Problems Sister    • No Known Problems Sister    • No Known Problems Sister    • Other Son         lyme disease     Social History     Socioeconomic History   • Marital status: /Civil Union     Spouse name: None   • Number of children: 1   • Years of education: None   • Highest education level: None   Occupational History   • Occupation: Teacher     Comment:    Tobacco Use   • Smoking status: Never   • Smokeless tobacco: Never   Vaping Use   • Vaping status: Never Used   Substance and Sexual Activity   • Alcohol use: No   • Drug use: Never   • Sexual activity: Yes     Partners: Male     Birth control/protection: Post-menopausal     Comment:    Other Topics Concern   • None   Social History Narrative    · Most recent tobacco use screenin2019      · Do you currently or have you served in the [employer]:   No      · Were you activated, into active duty, as a member of the [employer] or as a Reservist:   No      Social Determinants of Health     Financial Resource Strain: Not on file   Food Insecurity: Not on file   Transportation Needs: Not on file   Physical Activity: Not on file   Stress: Not on file   Social Connections: Not on file   Intimate Partner Violence: Not on file   Housing Stability: Not on file     Current Outpatient Medications on File Prior to Visit   Medication Sig   • Ascorbic Acid (VITAMIN C PO) Take by mouth daily   • Bilberry, Vaccinium myrtillus, (BILBERRY EXTRACT PO) Take by mouth daily    • BIOTIN PO Take by mouth daily    • Calcium Carbonate-Vitamin D (CALCIUM-D PO) Take by mouth daily    • COLLAGEN PO Take by mouth daily    • Cyanocobalamin (VITAMIN B-12 PO) Take by mouth daily    • estradiol (ESTRACE) 0.1 mg/g vaginal cream  "Insert 1 g into the vagina 2 (two) times a week   • GLUCOSAMINE-CHONDROITIN PO Take by mouth daily    • Misc Natural Products (TART CHERRY ADVANCED PO) Take by mouth daily   • Misc Natural Products (Turmeric Curcumin) CAPS Take by mouth daily    • Multiple Vitamin (DAILY VITAMIN) tablet Take 1 tablet by mouth daily    • Multiple Vitamins-Minerals (HAIR VITAMINS PO) Take by mouth daily    • NON FORMULARY luti-gold OTC daily/ viviscalpro OTC daily   • oxybutynin (DITROPAN XL) 15 MG 24 hr tablet TAKE 1 TABLET BY MOUTH EVERYDAY AT BEDTIME   • pantoprazole (PROTONIX) 40 mg tablet Take 1 tablet (40 mg total) by mouth daily   • Pomegranate, [place] granatum, (POMEGRANATE PO) Take by mouth daily    • Probiotic Product (PROBIOTIC DAILY PO) Take by mouth daily    • Pyridoxine HCl (VITAMIN B6 PO) Take by mouth daily    • Red Yeast Rice Extract (RED YEAST RICE PO) Take by mouth daily    • [DISCONTINUED] famotidine (PEPCID) 40 MG tablet 1 tablet by mouth twice a day   • [DISCONTINUED] triamcinolone (KENALOG) 0.5 % cream Apply topically 3 (three) times a day   • benzonatate (TESSALON PERLES) 100 mg capsule Take 1 capsule (100 mg total) by mouth 3 (three) times a day as needed for cough (Patient not taking: Reported on 12/5/2023)     No Known Allergies  Immunization History   Administered Date(s) Administered   • COVID-19 PFIZER VACCINE 0.3 ML IM 03/31/2021, 04/21/2021   • H1N1, All Formulations 01/05/2010   • Influenza, injectable, quadrivalent, preservative free 0.5 mL 10/01/2020   • Zoster Vaccine Recombinant 07/29/2020       Objective     /68 (BP Location: Left arm, Patient Position: Sitting, Cuff Size: Standard)   Pulse 66   Temp 98.3 °F (36.8 °C)   Resp 16   Ht 5' 1\" (1.549 m)   Wt 51.8 kg (114 lb 3.2 oz)   SpO2 97%   BMI 21.58 kg/m²     Physical Exam  Vitals and nursing note reviewed.   Constitutional:       Appearance: She is well-developed.   HENT:      Head: Normocephalic and atraumatic.      Nose: Nose " normal.      Mouth/Throat:      Mouth: Mucous membranes are moist.   Eyes:      General: No scleral icterus.     Conjunctiva/sclera: Conjunctivae normal.      Pupils: Pupils are equal, round, and reactive to light.   Neck:      Thyroid: No thyromegaly.   Cardiovascular:      Rate and Rhythm: Normal rate and regular rhythm.   Pulmonary:      Effort: Pulmonary effort is normal.      Breath sounds: Normal breath sounds. No wheezing.   Abdominal:      General: Bowel sounds are normal. There is no distension.      Palpations: Abdomen is soft.      Tenderness: There is no abdominal tenderness. There is no guarding or rebound.   Musculoskeletal:         General: No tenderness or deformity. Normal range of motion.      Cervical back: Normal range of motion and neck supple.   Skin:     General: Skin is warm and dry.      Findings: No erythema or rash.   Neurological:      Mental Status: She is alert and oriented to person, place, and time.      Sensory: No sensory deficit.   Psychiatric:         Mood and Affect: Mood normal.         Behavior: Behavior normal.         Thought Content: Thought content normal.         Judgment: Judgment normal.       Bishnu So MD

## 2024-05-08 ENCOUNTER — OFFICE VISIT (OUTPATIENT)
Dept: GASTROENTEROLOGY | Facility: CLINIC | Age: 59
End: 2024-05-08
Payer: COMMERCIAL

## 2024-05-08 VITALS
SYSTOLIC BLOOD PRESSURE: 102 MMHG | DIASTOLIC BLOOD PRESSURE: 63 MMHG | WEIGHT: 115 LBS | HEIGHT: 61 IN | HEART RATE: 61 BPM | BODY MASS INDEX: 21.71 KG/M2

## 2024-05-08 DIAGNOSIS — R10.13 EPIGASTRIC PAIN: ICD-10-CM

## 2024-05-08 DIAGNOSIS — E80.4 GILBERT SYNDROME: ICD-10-CM

## 2024-05-08 DIAGNOSIS — K21.9 GASTROESOPHAGEAL REFLUX DISEASE WITHOUT ESOPHAGITIS: Primary | ICD-10-CM

## 2024-05-08 PROCEDURE — 99214 OFFICE O/P EST MOD 30 MIN: CPT | Performed by: NURSE PRACTITIONER

## 2024-05-08 NOTE — PATIENT INSTRUCTIONS
GERD (Gastroesophageal Reflux Disease)   WHAT YOU NEED TO KNOW:   Gastroesophageal reflux disease (GERD) is reflux that happens more than 2 times a week for a few weeks. Reflux means acid and food in your stomach back up into your esophagus. GERD can cause other health problems over time if it is not treated.       DISCHARGE INSTRUCTIONS:   Call your local emergency number (911 in the [place]) if:   You have severe chest pain and sudden trouble breathing.      Return to the emergency department if:   You have trouble breathing after you vomit.    You have trouble swallowing, or pain with swallowing.    Your bowel movements are black, bloody, or tarry-looking.    Your vomit looks like coffee grounds or has blood in it.    Call your doctor or gastroenterologist if:   You feel full and cannot burp or vomit.    You vomit large amounts, or you vomit often.    You are losing weight without trying.    Your symptoms get worse or do not improve with treatment.    You have questions or concerns about your condition or care.    Medicines:   Medicines  are used to decrease stomach acid. Medicine may also be used to help your lower esophageal sphincter and stomach contract (tighten) more.    Take your medicine as directed.  Contact your healthcare provider if you think your medicine is not helping or if you have side effects. Tell your provider if you are allergic to any medicine. Keep a list of the medicines, vitamins, and herbs you take. Include the amounts, and when and why you take them. Bring the list or the pill bottles to follow-up visits. Carry your medicine list with you in case of an emergency.    Manage GERD:       Do not have foods or drinks that may increase heartburn.  These include chocolate, peppermint, fried or fatty foods, drinks that contain caffeine, or carbonated drinks (soda). Other foods include spicy foods, onions, tomatoes, and tomato-based foods. Do not have foods or drinks that can irritate your  esophagus, such as citrus fruits, juices, and alcohol.    Do not eat large meals.  When you eat a lot of food at one time, your stomach needs more acid to digest it. Eat 6 small meals each day instead of 3 large ones, and eat slowly. Do not eat meals 2 to 3 hours before bedtime.    Elevate the head of your bed.  Place 6-inch blocks under the head of your bed frame. You may also use more than one pillow under your head and shoulders while you sleep.    Maintain a healthy weight.  If you are overweight, weight loss may help relieve symptoms of GERD.    Do not smoke.  Smoking weakens the lower esophageal sphincter and increases the risk of GERD. Ask your healthcare provider for information if you currently smoke and need help to quit. E-cigarettes or smokeless tobacco still contain nicotine. Talk to your healthcare provider before you use these products.    Do not put pressure on your abdomen.  Pressure pushes acid up into your esophagus. Do not wear clothing that is tight around your waist. Do not bend over. Bend at the knees if you need to pick something up.  Follow up with your doctor or gastroenterologist as directed:  Write down your questions so you remember to ask them during your visits.  © Copyright [employer] 2023 Information is for End User's use only and may not be sold, redistributed or otherwise used for commercial purposes.  The above information is an  only. It is not intended as medical advice for individual conditions or treatments. Talk to your doctor, nurse or pharmacist before following any medical regimen to see if it is safe and effective for you.    High Fiber Diet   WHAT YOU NEED TO KNOW:   A high-fiber diet includes foods that have a high amount of fiber. Fiber is the part of fruits, vegetables, and grains that is not broken down by your body. Fiber keeps your bowel movements regular. Fiber can also help lower your cholesterol level, control blood sugar in people with diabetes,  and relieve constipation. Fiber can also help you control your weight because it helps you feel full faster. Most adults should eat 25 to 35 grams of fiber each day. Talk to your dietitian or healthcare provider about the amount of fiber you need.  DISCHARGE INSTRUCTIONS:   Good sources of fiber:       Foods with at least 4 grams of fiber per serving:      ? to ½ cup of high-fiber cereal (check the nutrition label on the box)    ½ cup of blackberries or raspberries    4 dried prunes    1 cooked artichoke    ½ cup of cooked legumes, such as lentils, or red, kidney, and gray beans    Foods with 1 to 3 grams of fiber per servin slice of whole-wheat, pumpernickel, or rye bread    ½ cup of cooked brown rice    4 whole-wheat crackers    1 cup of oatmeal    ½ cup of cereal with 1 to 3 grams of fiber per serving (check the nutrition label on the box)    1 small piece of fruit, such as an apple, banana, pear, kiwi, or orange    3 dates    ½ cup of canned apricots, fruit cocktail, peaches, or pears    ½ cup of raw or cooked vegetables, such as carrots, cauliflower, cabbage, spinach, squash, or corn  Ways that you can increase fiber in your diet:   Choose brown or wild rice instead of white rice.     Use whole wheat flour in recipes instead of white or all-purpose flour.     Add beans and peas to casseroles or soups.     Choose fresh fruit and vegetables with peels or skins on instead of juices.    Other diet guidelines to follow:   Add fiber to your diet slowly.  You may have abdominal discomfort, bloating, and gas if you add fiber to your diet too quickly.     Drink plenty of liquids as you add fiber to your diet.  You may have nausea or develop constipation if you do not drink enough water. Ask how much liquid to drink each day and which liquids are best for you.    © 2023 Information is for End User's use only and may not be sold, redistributed or otherwise used for commercial purposes.  The  above information is an  only. It is not intended as medical advice for individual conditions or treatments. Talk to your doctor, nurse or pharmacist before following any medical regimen to see if it is safe and effective for you.      Gas and Bloating   WHAT YOU NEED TO KNOW:   What do I need to know about gas and bloating?  Gas is air that collects in your digestive system (stomach or intestines). Bloating is the tight, full feeling you get from too much gas.       What increases my risk for gas and bloating?   Vegetables, such as beans, cabbage, and broccoli    Starches, such as potatoes, corn, wheat, and pasta    Dairy products, or food intolerance (trouble digesting certain foods)    High-fiber cereals and breads, high-fat foods, or carbonated drinks, such as soft drinks    Chewing gum, smoking cigarettes, or loose dentures    A problem such as celiac disease, pancreatic insufficiency, or ascites    A medical condition such as diabetes, gastroparesis, scleroderma, or hypothyroidism    A bowel obstruction or cancer of the stomach, bowel, or ovary    An infection, such as Helicobacter pylori (H. pylori)    Weight gain    What are the symptoms of gas and bloating?   Abdominal pain    Bloating that is worse during the day and better at night    Burping or passing gas more often than usual    Constipation    How is the cause of gas and bloating diagnosed?  Your healthcare provider will ask about what you eat and examine your abdomen. You may need any of the following tests to check for a medical condition that may be causing your symptoms:  Blood tests  are used to check for problems with your organs or an infection.    Gas tests  are used to check for H. pylori or a problem with food digestion. You will breathe into a machine that can test your breath for signs of these or other problems.    Bowel movement samples  may be tested for infection.    X-ray, CT scan, MRI, or ultrasound  pictures may show  a blockage in your bowels or other problems in your digestive system. Contrast liquid may be given to help the area show up better in pictures. Tell the healthcare provider if you have ever had an allergic reaction to contrast liquid. Do not enter the MRI room with anything metal. Metal may cause serious injury. Tell the provider if you have any metal in or on your body.    Endoscopy  is used to check your digestive system. An endoscope is a long, flexible tube with a light and tiny camera on the end. The tube is put into your mouth and guided down into your digestive system. Samples may be taken and sent to a lab for tests.       How is gas and bloating treated?  Treatment depends on the cause. Gas relief medicines may help decrease gas pain and bloating. These are available without a doctor's order. You may need other medicines to control a medical condition. Surgery may be needed if you have a tumor or a problem such as a bowel obstruction.  How can I manage or prevent gas and bloating?   Keep a record.  Write down what you eat and drink and how often you pass gas each day.    Eat and drink slowly.  Choose foods that do not cause gas, such as meat, poultry, fish, and eggs. Avoid high-fat foods and vegetables or starches that can cause gas. Do not drink carbonated drinks. Add foods back into your diet one at a time after 1 week. If the food causes symptoms, avoid it.    Be physically active.  Physical activity, such as exercise, can help relieve gas and constipation. Physical activity can also help you lose weight and keep it off. Your healthcare provider can help you create a physical activity plan.         Do not smoke cigarettes or chew gum.  These can cause you to swallow air.    Make sure your dentures fit properly.  Have your dentures fixed if they are loose. Loose dentures can cause you to swallow too much air.    When should I seek immediate care?   You have severe abdominal pain.    You have blood in your  bowel movement.    When should I call my doctor?   You have a fever.    You vomit or have diarrhea.    You lose weight without trying.    You have questions or concerns about your condition or care.    CARE AGREEMENT:   You have the right to help plan your care. Learn about your health condition and how it may be treated. Discuss treatment options with your healthcare providers to decide what care you want to receive. You always have the right to refuse treatment. The above information is an  only. It is not intended as medical advice for individual conditions or treatments. Talk to your doctor, nurse or pharmacist before following any medical regimen to see if it is safe and effective for you.  © Copyright [employer] 2023 Information is for End User's use only and may not be sold, redistributed or otherwise used for commercial purposes.

## 2024-05-08 NOTE — PROGRESS NOTES
[facility] [place] - Outpatient Follow-up Note  Ariela Colmenares 58 y.o. female MRN: [id number]  Encounter: [id number]          ASSESSMENT AND PLAN:      1. Gastroesophageal reflux disease without esophagitis  Controlled on Pepcid 40mg daily, anti-reflux diet, and avoidance of late night meals. Last EGD 5/2023 showed small sliding hiatal hernia, reflux esophagitis, and mild gastritis. Biopsies were negative for H.pylori and Lucero's.  -Continue Pepcid  -Continue anti reflux diet  -Work on stress management, avoidance of late night meals    2. Epigastric pain  Longstanding after gallbladder was removed possibly adhesive disease. CT scan chest/abd/pelvis was benign December 2023. She reports this pain has become less frequent & may improve w/ BM.  -Continue probiotic, optimize dietary fiber intake  -If pain becomes more frequent could try IBgard or Bentyl    3. Gilbert syndrome  Recent bilirubin normal    F/u in 1 year  ______________________________________________________________________    SUBJECTIVE:  Ariela Colmenares is a 58 y.o. female presenting for follow up. She has a history of GERD,cholecystectomy, and gilbert's syndrome. She has been taking Pepcid 40mg daily and has been doing fairly well on this alone with out PPI. She notices a big trigger for her symptoms was eating too late at night, so she tries to avoid this. She reports her epigastric pain has become less frequent, she had pain once last week, but before that she hadn't had it in a few months. She feels like the pain maybe improves after a BM. She is taking a probiotic which she feels like has helped with her bowels. She works as a  and has a lot of stress, doesn't get enough sleep. CT scan done was unremarkable.          REVIEW OF SYSTEMS IS OTHERWISE NEGATIVE.      Historical Information   Past Medical History:   Diagnosis Date    Breast cyst     Cholelithiasis     Cystocele, midline     Epigastric pain     Frequent  urination     GERD (gastroesophageal reflux disease)     H/O bilateral breast implants     Headache     Hyperlipidemia     Lyme disease     2016    Mild acid reflux     PONV (postoperative nausea and vomiting)     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     Spider veins of both lower extremities     Umbilical hernia     Umbilical hernia without obstruction and without gangrene 08/11/2020    Wears glasses      Past Surgical History:   Procedure Laterality Date    AUGMENTATION MAMMAPLASTY Bilateral 2014    retro pec saline    CHOLECYSTECTOMY      COLONOSCOPY  2014    EGD      HERNIA REPAIR      AZ CMBND ANTERPOST COLPORRAPHY W/CYSTO N/A 12/22/2020    Procedure: ANT POSTCOLPORRHAPHY;  Surgeon: Vasile Cardoza MD;  [facility];  Service: UroGynecology           AZ CYSTOURETHROSCOPY N/A 12/22/2020    Procedure: CYSTOSCOPY;  Surgeon: Vasile Cardoza MD;  [facility];  Service: UroGynecology           AZ LAPAROSCOPY SURG CHOLECYSTECTOMY N/A 12/22/2020    Procedure: LAP CARLOTTA;  Surgeon: Enrico Espinoza MD;  [facility];  Service: General    AZ RPR UMBILICAL HRNA 5 YRS/> REDUCIBLE N/A 12/22/2020    Procedure: REPAIR HERNIA UMBILICAL;  Surgeon: Enrico Espinoza MD;  [facility];  Service: General    AZ SLING OPERATION STRESS INCONTINENCE N/A 12/22/2020    Procedure: PUBOVAGINAL SLING;  Surgeon: Vasile Cardoza MD;  [facility];  Service: UroGynecology           UPPER GASTROINTESTINAL ENDOSCOPY       Social History   Social History     Substance and Sexual Activity   Alcohol Use No     Social History     Substance and Sexual Activity   Drug Use Never     Social History     Tobacco Use   Smoking Status Never   Smokeless Tobacco Never     Family History   Problem Relation Age of Onset    Hypertension Mother     Diabetes Father     No Known Problems Sister     No Known Problems Sister     No Known Problems Sister     No Known Problems Sister     Other Son   "       lyme disease       Meds/Allergies       Current Outpatient Medications:     Ascorbic Acid (VITAMIN C PO)    Bilberry, Vaccinium myrtillus, (BILBERRY EXTRACT PO)    BIOTIN PO    Calcium Carbonate-Vitamin D (CALCIUM-D PO)    COLLAGEN PO    Cyanocobalamin (VITAMIN B-12 PO)    estradiol (ESTRACE) 0.1 mg/g vaginal cream    famotidine (PEPCID) 40 MG tablet    GLUCOSAMINE-CHONDROITIN PO    hydrocortisone 2.5 % lotion    Misc Natural Products (TART CHERRY ADVANCED PO)    Misc Natural Products (Turmeric Curcumin) CAPS    Multiple Vitamin (DAILY VITAMIN) tablet    Multiple Vitamins-Minerals (HAIR VITAMINS PO)    NON FORMULARY    oxybutynin (DITROPAN XL) 15 MG 24 hr tablet    pantoprazole (PROTONIX) 40 mg tablet    Pomegranate, Punica granatum, (POMEGRANATE PO)    Probiotic Product (PROBIOTIC DAILY PO)    Pyridoxine HCl (VITAMIN B6 PO)    Red Yeast Rice Extract (RED YEAST RICE PO)    triamcinolone (KENALOG) 0.5 % cream    benzonatate (TESSALON PERLES) 100 mg capsule    diphenoxylate-atropine (LOMOTIL) 2.5-0.025 mg per tablet    No Known Allergies        Objective     Blood pressure 102/63, pulse 61, height 5' 1\" (1.549 m), weight 52.2 kg (115 lb), not currently breastfeeding. Body mass index is 21.73 kg/m².      PHYSICAL EXAM:      General Appearance:   Alert, cooperative, no distress   HEENT:   Normocephalic, atraumatic, anicteric.     Neck:  Supple, symmetrical, trachea midline   Lungs:   Clear to auscultation bilaterally; no rales, rhonchi or wheezing; respirations unlabored    Heart::   Regular rate and rhythm; no murmur.   Abdomen:   Soft, non-tender, non-distended; normal bowel sounds; no masses, no organomegaly    Genitalia:   Deferred    Rectal:   Deferred    Extremities:  No cyanosis, clubbing or edema    Skin:  No jaundice, rashes, or lesions    Lymph nodes:  No palpable cervical lymphadenopathy        Lab Results:   No visits with results within 1 Day(s) from this visit.   Latest known visit with results is: "   Lab on 12/26/2023   Component Date Value    Bilirubin, Direct 12/26/2023 0.16     Total Bilirubin 12/26/2023 0.83          Radiology Results:   No results found.

## 2024-06-11 ENCOUNTER — OFFICE VISIT (OUTPATIENT)
Dept: FAMILY MEDICINE CLINIC | Facility: CLINIC | Age: 59
End: 2024-06-11
Payer: COMMERCIAL

## 2024-06-11 VITALS
WEIGHT: 114 LBS | OXYGEN SATURATION: 98 % | HEIGHT: 61 IN | BODY MASS INDEX: 21.52 KG/M2 | HEART RATE: 54 BPM | RESPIRATION RATE: 16 BRPM | TEMPERATURE: 97.9 F | DIASTOLIC BLOOD PRESSURE: 78 MMHG | SYSTOLIC BLOOD PRESSURE: 120 MMHG

## 2024-06-11 DIAGNOSIS — L23.7 CONTACT DERMATITIS DUE TO POISON IVY: Primary | ICD-10-CM

## 2024-06-11 PROCEDURE — 99213 OFFICE O/P EST LOW 20 MIN: CPT | Performed by: NURSE PRACTITIONER

## 2024-06-11 RX ORDER — METHYLPREDNISOLONE SODIUM SUCCINATE 125 MG/2ML
93 INJECTION, POWDER, LYOPHILIZED, FOR SOLUTION INTRAMUSCULAR; INTRAVENOUS ONCE
Status: COMPLETED | OUTPATIENT
Start: 2024-06-11 | End: 2024-06-11

## 2024-06-11 RX ORDER — TRIAMCINOLONE ACETONIDE 1 MG/G
CREAM TOPICAL 2 TIMES DAILY
Qty: 15 G | Refills: 0 | Status: SHIPPED | OUTPATIENT
Start: 2024-06-11

## 2024-06-11 RX ORDER — BETAMETHASONE DIPROPIONATE 0.5 MG/G
CREAM TOPICAL 2 TIMES DAILY
Qty: 30 G | Refills: 0 | Status: SHIPPED | OUTPATIENT
Start: 2024-06-11 | End: 2024-06-11

## 2024-06-11 RX ADMIN — METHYLPREDNISOLONE SODIUM SUCCINATE 93 MG: 125 INJECTION, POWDER, LYOPHILIZED, FOR SOLUTION INTRAMUSCULAR; INTRAVENOUS at 14:26

## 2024-06-11 NOTE — PROGRESS NOTES
Assessment/Plan:      Diagnoses and all orders for this visit:    Contact dermatitis due to poison ivy  -     methylPREDNISolone sodium succinate (Solu-MEDROL) injection 93 mg  -     betamethasone, augmented, (DIPROLENE-AF) 0.05 % cream; Apply topically 2 (two) times a day        Uncomplicated contact derm dur to poison exposure. Dosing all possible side effects of the prescribed medications or medications that had been prescribed in the past were reviewed and all questions were answered.  Patient verbalized agreement and understanding of the plan of care as outlined during the office visit today return to office as indicated or sooner if a problem arises.    Subjective:     Patient ID: Ariela Colmenares is a 58 y.o. female.    Pt with a complaint of rash due to poison ivy exposure. Rash on face and forearms.        Review of Systems   Skin:  Positive for rash.         Objective:     Physical Exam  Skin:     Findings: Rash present.

## 2024-06-12 ENCOUNTER — TELEPHONE (OUTPATIENT)
Age: 59
End: 2024-06-12

## 2024-06-12 DIAGNOSIS — L23.7 CONTACT DERMATITIS DUE TO POISON IVY: Primary | ICD-10-CM

## 2024-06-12 RX ORDER — METHYLPREDNISOLONE 4 MG/1
TABLET ORAL
Qty: 21 EACH | Refills: 0 | Status: SHIPPED | OUTPATIENT
Start: 2024-06-12

## 2024-06-12 NOTE — TELEPHONE ENCOUNTER
Pt called to ask if her pills and cream had been sent to [facility] in [place].  The poison has spread on her arms and it's very itchy.  Please call pt to let her know.

## 2024-06-12 NOTE — TELEPHONE ENCOUNTER
Patient seen yesterday by Sallie.  She was given a shot at the office and cream sent to the pharmacy.  Patient stated that she was offered pills but declined.  She woke up this morning and the poison has spread on her arm.  She would like to take the pills at this time that were offered.  She would also like a refill of the cream, triamcinolone (KENALOG) 0.1 % cream.  She stated that the tube was very small and she is using it often.    [facility] in [place]

## 2024-07-01 DIAGNOSIS — J06.9 URI WITH COUGH AND CONGESTION: ICD-10-CM

## 2024-07-01 DIAGNOSIS — L23.7 CONTACT DERMATITIS DUE TO POISON IVY: ICD-10-CM

## 2024-07-02 RX ORDER — TRIAMCINOLONE ACETONIDE 1 MG/G
1 CREAM TOPICAL 2 TIMES DAILY
Qty: 15 G | Refills: 0 | Status: SHIPPED | OUTPATIENT
Start: 2024-07-02

## 2024-07-02 RX ORDER — BENZONATATE 100 MG/1
CAPSULE ORAL
Qty: 60 CAPSULE | Refills: 0 | Status: SHIPPED | OUTPATIENT
Start: 2024-07-02

## 2024-08-12 ENCOUNTER — TELEPHONE (OUTPATIENT)
Age: 59
End: 2024-08-12

## 2024-08-12 ENCOUNTER — PREP FOR PROCEDURE (OUTPATIENT)
Age: 59
End: 2024-08-12

## 2024-08-12 DIAGNOSIS — R10.13 EPIGASTRIC PAIN: Primary | ICD-10-CM

## 2024-08-12 DIAGNOSIS — R07.82 INTERCOSTAL PAIN: ICD-10-CM

## 2024-08-12 NOTE — TELEPHONE ENCOUNTER
Scheduled date of EGD(as of today): 8/15/24  Physician performing EGD: IRMA  Location of EGD:  [facility]  Instructions to [facility]       OA Questions for EGD  Date: 8/12/24   Screened by: Phyllis Araujo     Referring Provider:      Pre-Screening: BMI  21.54  Past EGD? If yes -   Date: 4/17/23  Physician/Facility:  [facility]  Reason:      SCHEDULING STAFF: If the patient is over 75 years old, please schedule an office visit.  ·      Does the patient want to see a gastroenterologist prior to their procedure to discuss any GI symptoms? NO   ·      Has the patient been hospitalized or had abdominal surgery in the past 6 months? NO   ·      Does the patient use supplemental oxygen? NO   ·      Does the patient take [Coumadin], [Lovenox], [Plavix], [Eliquis], [Xarelto], or other blood thinning medication? No  ·      Has the patient had a stroke, cardiac event, or stent placed in the past year? NO

## 2024-08-12 NOTE — TELEPHONE ENCOUNTER
Pt calling for an appt this week for an EGD. I gave the pt the next avail OV and offered to put a message to the nurses. The pt said it all has to be done this week and she will try to call other places and will call us back if she can't get in anywhere this week

## 2024-08-13 ENCOUNTER — ANESTHESIA (OUTPATIENT)
Dept: ANESTHESIOLOGY | Facility: HOSPITAL | Age: 59
End: 2024-08-13

## 2024-08-13 ENCOUNTER — ANESTHESIA EVENT (OUTPATIENT)
Dept: ANESTHESIOLOGY | Facility: HOSPITAL | Age: 59
End: 2024-08-13

## 2024-08-15 ENCOUNTER — ANESTHESIA (OUTPATIENT)
Dept: GASTROENTEROLOGY | Facility: AMBULARY SURGERY CENTER | Age: 59
End: 2024-08-15

## 2024-08-15 ENCOUNTER — ANESTHESIA EVENT (OUTPATIENT)
Dept: GASTROENTEROLOGY | Facility: AMBULARY SURGERY CENTER | Age: 59
End: 2024-08-15

## 2024-08-15 ENCOUNTER — HOSPITAL ENCOUNTER (OUTPATIENT)
Dept: GASTROENTEROLOGY | Facility: AMBULARY SURGERY CENTER | Age: 59
Setting detail: OUTPATIENT SURGERY
End: 2024-08-15
Attending: INTERNAL MEDICINE
Payer: COMMERCIAL

## 2024-08-15 VITALS
SYSTOLIC BLOOD PRESSURE: 92 MMHG | TEMPERATURE: 97.2 F | DIASTOLIC BLOOD PRESSURE: 51 MMHG | BODY MASS INDEX: 20.97 KG/M2 | RESPIRATION RATE: 18 BRPM | HEART RATE: 62 BPM | OXYGEN SATURATION: 96 % | WEIGHT: 111 LBS

## 2024-08-15 DIAGNOSIS — R10.13 EPIGASTRIC PAIN: ICD-10-CM

## 2024-08-15 DIAGNOSIS — R07.82 INTERCOSTAL PAIN: ICD-10-CM

## 2024-08-15 PROCEDURE — 88305 TISSUE EXAM BY PATHOLOGIST: CPT | Performed by: STUDENT IN AN ORGANIZED HEALTH CARE EDUCATION/TRAINING PROGRAM

## 2024-08-15 PROCEDURE — 88342 IMHCHEM/IMCYTCHM 1ST ANTB: CPT | Performed by: STUDENT IN AN ORGANIZED HEALTH CARE EDUCATION/TRAINING PROGRAM

## 2024-08-15 PROCEDURE — 43239 EGD BIOPSY SINGLE/MULTIPLE: CPT | Performed by: INTERNAL MEDICINE

## 2024-08-15 RX ORDER — PANTOPRAZOLE SODIUM 40 MG/1
40 TABLET, DELAYED RELEASE ORAL DAILY
Qty: 90 TABLET | Refills: 1 | Status: SHIPPED | OUTPATIENT
Start: 2024-08-15

## 2024-08-15 RX ORDER — LIDOCAINE HYDROCHLORIDE 10 MG/ML
INJECTION, SOLUTION EPIDURAL; INFILTRATION; INTRACAUDAL; PERINEURAL AS NEEDED
Status: DISCONTINUED | OUTPATIENT
Start: 2024-08-15 | End: 2024-08-15

## 2024-08-15 RX ORDER — PROPOFOL 10 MG/ML
INJECTION, EMULSION INTRAVENOUS AS NEEDED
Status: DISCONTINUED | OUTPATIENT
Start: 2024-08-15 | End: 2024-08-15

## 2024-08-15 RX ORDER — SODIUM CHLORIDE, SODIUM LACTATE, POTASSIUM CHLORIDE, CALCIUM CHLORIDE 600; 310; 30; 20 MG/100ML; MG/100ML; MG/100ML; MG/100ML
INJECTION, SOLUTION INTRAVENOUS CONTINUOUS PRN
Status: DISCONTINUED | OUTPATIENT
Start: 2024-08-15 | End: 2024-08-15

## 2024-08-15 RX ORDER — SUCRALFATE 1 G/1
1 TABLET ORAL 4 TIMES DAILY
Qty: 40 TABLET | Refills: 0 | Status: SHIPPED | OUTPATIENT
Start: 2024-08-15

## 2024-08-15 RX ADMIN — PROPOFOL 120 MG: 10 INJECTION, EMULSION INTRAVENOUS at 12:47

## 2024-08-15 RX ADMIN — LIDOCAINE HYDROCHLORIDE 50 MG: 10 INJECTION, SOLUTION EPIDURAL; INFILTRATION; INTRACAUDAL at 12:47

## 2024-08-15 RX ADMIN — SODIUM CHLORIDE, SODIUM LACTATE, POTASSIUM CHLORIDE, AND CALCIUM CHLORIDE: .6; .31; .03; .02 INJECTION, SOLUTION INTRAVENOUS at 12:44

## 2024-08-15 NOTE — ANESTHESIA PREPROCEDURE EVALUATION
Procedure:  EGD    Relevant Problems   GI/HEPATIC   (+) Gastroesophageal reflux disease with esophagitis without hemorrhage        Physical Exam    Airway    Mallampati score: II  TM Distance: >3 FB  Neck ROM: full     Dental   No notable dental hx     Cardiovascular  Rhythm: regular, Rate: normal    Pulmonary   Breath sounds clear to auscultation    Other Findings  post-pubertal.      Anesthesia Plan  ASA Score- 2     Anesthesia Type- IV sedation with anesthesia with ASA Monitors.         Additional Monitors:     Airway Plan:            Plan Factors-Exercise tolerance (METS): >4 METS.    Chart reviewed.   Existing labs reviewed. Patient summary reviewed.    Patient is not a current smoker.              Induction- intravenous.    Postoperative Plan-         Informed Consent- Anesthetic plan and risks discussed with patient.  I personally reviewed this patient with the CRNA. Discussed and agreed on the Anesthesia Plan with the CRNA..

## 2024-08-15 NOTE — H&P
History and Physical - [facility]  Ariela Colmenares 58 y.o. female MRN: [id number]        HPI: 58-year-old female with history of GERD reports having some epigastric pain.    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     Spider veins of both lower extremities     Umbilical hernia     Umbilical hernia without obstruction and without gangrene 08/11/2020    Wears glasses      Past Surgical History:   Procedure Laterality Date    AUGMENTATION MAMMAPLASTY Bilateral 2014    retro pec saline    CHOLECYSTECTOMY      COLONOSCOPY  2014    EGD      HERNIA REPAIR      [place] CMBND ANTERPOST COLPORRAPHY W/CYSTO N/A 12/22/2020    Procedure: ANT POSTCOLPORRHAPHY;  Surgeon: Vasile Cardoza MD;  [facility];  Service: UroGynecology           VA CYSTOURETHROSCOPY N/A 12/22/2020    Procedure: CYSTOSCOPY;  Surgeon: Vasile Cardoza MD;  [facility];  Service: UroGynecology           VA LAPAROSCOPY SURG CHOLECYSTECTOMY N/A 12/22/2020    Procedure: LAP CARLOTTA;  Surgeon: Enrico Espinoza MD;  [facility];  Service: General    VA RPR UMBILICAL HRNA 5 YRS/> REDUCIBLE N/A 12/22/2020    Procedure: REPAIR HERNIA UMBILICAL;  Surgeon: Enrico Espinoza MD;  [facility];  Service: General    VA SLING OPERATION STRESS INCONTINENCE N/A 12/22/2020    Procedure: PUBOVAGINAL SLING;  Surgeon: Vasile Cardoza MD;  [facility];  Service: UroGynecology           UPPER GASTROINTESTINAL ENDOSCOPY       Social History   Social History     Substance and Sexual Activity   Alcohol Use No     Social History     Substance  and Sexual Activity   Drug Use Never     Social History     Tobacco Use   Smoking Status Never   Smokeless Tobacco Never     Family History   Problem Relation Age of Onset    Hypertension Mother     Diabetes Father     No Known Problems Sister     No Known Problems Sister     No Known Problems Sister     No Known Problems Sister     Other Son         lyme disease       Meds/Allergies     Not in a hospital admission.    No Known Allergies    Objective     not currently breastfeeding.    Physical Exam:    Chest- CTA  Heart- RRR  Abdomen- NT/ND  Extremities- No edema    ASSESSMENT:     GERD, epigastric pain    PLAN:    EGD and colonoscopy

## 2024-08-20 PROCEDURE — 88305 TISSUE EXAM BY PATHOLOGIST: CPT | Performed by: STUDENT IN AN ORGANIZED HEALTH CARE EDUCATION/TRAINING PROGRAM

## 2024-08-20 PROCEDURE — 88342 IMHCHEM/IMCYTCHM 1ST ANTB: CPT | Performed by: STUDENT IN AN ORGANIZED HEALTH CARE EDUCATION/TRAINING PROGRAM

## 2024-10-03 DIAGNOSIS — N32.81 OAB (OVERACTIVE BLADDER): ICD-10-CM

## 2024-10-03 RX ORDER — OXYBUTYNIN CHLORIDE 15 MG/1
15 TABLET, EXTENDED RELEASE ORAL
Qty: 90 TABLET | Refills: 1 | Status: SHIPPED | OUTPATIENT
Start: 2024-10-03

## 2024-10-08 ENCOUNTER — TELEPHONE (OUTPATIENT)
Dept: FAMILY MEDICINE CLINIC | Facility: CLINIC | Age: 59
End: 2024-10-08

## 2024-10-08 NOTE — TELEPHONE ENCOUNTER
She is rescheduling her 6 month visit with you ut no availability until November she is wondering if she can schedule a nurse visit for her Prolia shot just wanted to double check with you.

## 2024-10-10 ENCOUNTER — OFFICE VISIT (OUTPATIENT)
Dept: FAMILY MEDICINE CLINIC | Facility: CLINIC | Age: 59
End: 2024-10-10
Payer: COMMERCIAL

## 2024-10-10 DIAGNOSIS — M81.0 POST-MENOPAUSAL OSTEOPOROSIS: Primary | ICD-10-CM

## 2024-10-10 PROCEDURE — 96372 THER/PROPH/DIAG INJ SC/IM: CPT

## 2024-11-24 NOTE — PROGRESS NOTES
Name: Ariela Colmenares      : 1965      MRN: [id number]  Encounter Provider: TONJA Carrillo  Encounter Date: 2024   Encounter department: [facility] [place]    :  Assessment & Plan  Encounter for gynecological examination (general) (routine) without abnormal findings  Annual GYN examination completed today.   Health maintenance reviewed/updated as appropriate  Risk prevention and anticipatory guidance provided including:  Encouraged regular self breast exams and to call with changes   Age related Calcium and vitamin D intake  Dietary and lifestyle recommendations based on her age and weight. body mass index is unknown because there is no height or weight on file..    Tobacco and alcohol use, intervention ordered if applicable.   Condom use for prevention of STI's if sexually active.       Encounter for screening mammogram for breast cancer         Vaginal dryness, menopausal  Encouraged consistent use- at least twice weekly for best results.   Orders:    estradiol (ESTRACE) 0.1 mg/g vaginal cream; Insert 1 g into the vagina 2 (two) times a week    Age-related osteoporosis without current pathological fracture    Orders:    DXA bone density spine hip and pelvis; Future    Post-menopausal    Orders:    DXA bone density spine hip and pelvis; Future    OAB (overactive bladder)  Takes ditropan, moderate relief.  Offered to try alternate medication- mybetriq ordered  Orders:    Mirabegron ER (Myrbetriq) 50 MG TB24; Take 1 tablet (50 mg total) by mouth in the morning                    Subjective:      History of Present Illness     Areila Colmenares is a 59 y.o. female. Here for Gynecologic Exam (Postmenopausal /Pap 21 -/-/Mammo 23 bi-rads 1 /Colonoscopy 10/9/2020 )    Pt is postmenopausal. She denies PMB.   She denies any C/O abnormal vaginal discharge or irritation. S/P sling/prolapse surgery . Using oxybutinin for OAB.   Denies breast changes (had breast  augmentation w/ Implants)    Sexually active: yes; Monogamous/single partner  Denies C/O related to intimacy/sexual activity  Vaginal dryness: yes, managed with estrace Forgets to use regularly and still sometimes has dryness.  Encouraged consistent use    She reports she feels safe at home.   Lifestyle/exercise: active, Having knee pain  Dietary calcium/vit D  intake: adequate    HEALTH MAINTENANCE SCREENINGS:     Previous pap smears and ASCCP screening guidelines have been reviewed.   Last Pap:  07/26/2021; Next due 2026  History of abnormal pap: No,   Last mammogram:  12/26/2023  Last Colon Cancer Screening: colonoscopy: 10/09/2020 Cologuard:Not on file. Recall 10yrs  Last Dexa Scan: 11/22 On Prolia for osteoporosis    Hereditary Cancer Screening  FH Breast/Ovarian cancer: denies  FH Uterine cancer: denies  FH Colon cancer: denies  Cancer-related family history is not on file.      Substance Abuse Screening Completed. See hx and flowsheet.    Review of Systems   Constitutional:  Negative for appetite change, chills, fatigue, fever and unexpected weight change.   HENT: Negative.     Eyes: Negative.    Respiratory:  Negative for chest tightness and shortness of breath.    Cardiovascular:  Negative for chest pain and palpitations.   Gastrointestinal:  Negative for abdominal pain, constipation and vomiting.   Endocrine: Negative for cold intolerance and heat intolerance.   Genitourinary:         As per HPI   Musculoskeletal:  Negative for back pain, joint swelling and neck pain.   Skin:  Negative for color change and rash.   Neurological:  Negative for dizziness, weakness and numbness.   Hematological:  Does not bruise/bleed easily.   Psychiatric/Behavioral: Negative.         The following portions of the patient's history were reviewed and updated as appropriate: allergies, current medications, past family history, past medical history, past social history, past surgical history, and problem list.         Objective    There were no vitals taken for this visit.    Physical Exam  Constitutional:       General: She is not in acute distress.     Appearance: Normal appearance.   Genitourinary:      Vulva and rectum normal.      No lesions in the vagina.      Right Labia: No rash or lesions.     Left Labia: No lesions or rash.     No vaginal discharge, erythema, tenderness or bleeding.      No vaginal prolapse present.     Mild vaginal atrophy present.       Right Adnexa: not tender and no mass present.     Left Adnexa: not tender and no mass present.     No cervical motion tenderness, discharge or friability.      Uterus is not enlarged or tender.      No urethral prolapse present.      Pelvic exam was performed with patient in the lithotomy position.   Breasts:     Breasts are symmetrical.      Right: Breast implant present. No inverted nipple, mass, nipple discharge, skin change or tenderness.      Left: Breast implant present. No inverted nipple, mass, nipple discharge, skin change or tenderness.   HENT:      Head: Normocephalic and atraumatic.   Cardiovascular:      Rate and Rhythm: Normal rate.      Heart sounds: No murmur heard.  Pulmonary:      Effort: Pulmonary effort is normal.      Breath sounds: Normal breath sounds.   Abdominal:      General: There is no distension.      Palpations: Abdomen is soft.      Tenderness: There is no abdominal tenderness.   Musculoskeletal:         General: Normal range of motion.      Cervical back: Normal range of motion.   Lymphadenopathy:      Cervical: No cervical adenopathy.   Neurological:      Mental Status: She is alert and oriented to person, place, and time.   Skin:     General: Skin is warm and dry.   Psychiatric:         Mood and Affect: Mood normal.         Behavior: Behavior normal.   Vitals reviewed.

## 2024-11-24 NOTE — ASSESSMENT & PLAN NOTE
Encouraged consistent use- at least twice weekly for best results.   Orders:    estradiol (ESTRACE) 0.1 mg/g vaginal cream; Insert 1 g into the vagina 2 (two) times a week

## 2024-11-27 ENCOUNTER — APPOINTMENT (OUTPATIENT)
Dept: LAB | Facility: MEDICAL CENTER | Age: 59
End: 2024-11-27
Payer: COMMERCIAL

## 2024-11-27 ENCOUNTER — ANNUAL EXAM (OUTPATIENT)
Dept: OBGYN CLINIC | Facility: MEDICAL CENTER | Age: 59
End: 2024-11-27
Payer: COMMERCIAL

## 2024-11-27 VITALS
BODY MASS INDEX: 21.9 KG/M2 | HEIGHT: 61 IN | SYSTOLIC BLOOD PRESSURE: 102 MMHG | DIASTOLIC BLOOD PRESSURE: 56 MMHG | WEIGHT: 116 LBS

## 2024-11-27 DIAGNOSIS — N95.1 VAGINAL DRYNESS, MENOPAUSAL: ICD-10-CM

## 2024-11-27 DIAGNOSIS — E80.4 GILBERT SYNDROME: Primary | ICD-10-CM

## 2024-11-27 DIAGNOSIS — Z13.220 SCREENING CHOLESTEROL LEVEL: ICD-10-CM

## 2024-11-27 DIAGNOSIS — Z78.0 POST-MENOPAUSAL: ICD-10-CM

## 2024-11-27 DIAGNOSIS — N32.81 OAB (OVERACTIVE BLADDER): ICD-10-CM

## 2024-11-27 DIAGNOSIS — Z12.31 ENCOUNTER FOR SCREENING MAMMOGRAM FOR BREAST CANCER: ICD-10-CM

## 2024-11-27 DIAGNOSIS — R53.83 OTHER FATIGUE: ICD-10-CM

## 2024-11-27 DIAGNOSIS — Z01.419 ENCOUNTER FOR GYNECOLOGICAL EXAMINATION (GENERAL) (ROUTINE) WITHOUT ABNORMAL FINDINGS: Primary | ICD-10-CM

## 2024-11-27 DIAGNOSIS — M81.0 AGE-RELATED OSTEOPOROSIS WITHOUT CURRENT PATHOLOGICAL FRACTURE: ICD-10-CM

## 2024-11-27 LAB
ALBUMIN SERPL BCG-MCNC: 4.2 G/DL (ref 3.5–5)
ALP SERPL-CCNC: 47 U/L (ref 34–104)
ALT SERPL W P-5'-P-CCNC: 18 U/L (ref 7–52)
ANION GAP SERPL CALCULATED.3IONS-SCNC: 10 MMOL/L (ref 4–13)
AST SERPL W P-5'-P-CCNC: 29 U/L (ref 13–39)
BACTERIA UR QL AUTO: NORMAL /HPF
BASOPHILS # BLD AUTO: 0.03 THOUSANDS/ΜL (ref 0–0.1)
BASOPHILS NFR BLD AUTO: 1 % (ref 0–1)
BILIRUB DIRECT SERPL-MCNC: 0.16 MG/DL (ref 0–0.2)
BILIRUB SERPL-MCNC: 1.11 MG/DL (ref 0.2–1)
BILIRUB UR QL STRIP: NEGATIVE
BUN SERPL-MCNC: 18 MG/DL (ref 5–25)
CALCIUM SERPL-MCNC: 9 MG/DL (ref 8.4–10.2)
CHLORIDE SERPL-SCNC: 103 MMOL/L (ref 96–108)
CHOLEST SERPL-MCNC: 198 MG/DL (ref ?–200)
CLARITY UR: CLEAR
CO2 SERPL-SCNC: 28 MMOL/L (ref 21–32)
COLOR UR: YELLOW
CREAT SERPL-MCNC: 0.67 MG/DL (ref 0.6–1.3)
EOSINOPHIL # BLD AUTO: 0.3 THOUSAND/ΜL (ref 0–0.61)
EOSINOPHIL NFR BLD AUTO: 7 % (ref 0–6)
ERYTHROCYTE [DISTWIDTH] IN BLOOD BY AUTOMATED COUNT: 12.7 % (ref 11.6–15.1)
GFR SERPL CREATININE-BSD FRML MDRD: 95 ML/MIN/1.73SQ M
GLUCOSE P FAST SERPL-MCNC: 89 MG/DL (ref 65–99)
GLUCOSE UR STRIP-MCNC: NEGATIVE MG/DL
HCT VFR BLD AUTO: 40.6 % (ref 34.8–46.1)
HDLC SERPL-MCNC: 74 MG/DL
HGB BLD-MCNC: 13.7 G/DL (ref 11.5–15.4)
HGB UR QL STRIP.AUTO: NEGATIVE
IMM GRANULOCYTES # BLD AUTO: 0.01 THOUSAND/UL (ref 0–0.2)
IMM GRANULOCYTES NFR BLD AUTO: 0 % (ref 0–2)
KETONES UR STRIP-MCNC: NEGATIVE MG/DL
LDLC SERPL CALC-MCNC: 111 MG/DL (ref 0–100)
LEUKOCYTE ESTERASE UR QL STRIP: NEGATIVE
LYMPHOCYTES # BLD AUTO: 1.55 THOUSANDS/ΜL (ref 0.6–4.47)
LYMPHOCYTES NFR BLD AUTO: 37 % (ref 14–44)
MAGNESIUM SERPL-MCNC: 2.2 MG/DL (ref 1.9–2.7)
MCH RBC QN AUTO: 33 PG (ref 26.8–34.3)
MCHC RBC AUTO-ENTMCNC: 33.7 G/DL (ref 31.4–37.4)
MCV RBC AUTO: 98 FL (ref 82–98)
MONOCYTES # BLD AUTO: 0.36 THOUSAND/ΜL (ref 0.17–1.22)
MONOCYTES NFR BLD AUTO: 9 % (ref 4–12)
NEUTROPHILS # BLD AUTO: 1.94 THOUSANDS/ΜL (ref 1.85–7.62)
NEUTS SEG NFR BLD AUTO: 46 % (ref 43–75)
NITRITE UR QL STRIP: NEGATIVE
NON-SQ EPI CELLS URNS QL MICRO: NORMAL /HPF
NRBC BLD AUTO-RTO: 0 /100 WBCS
PH UR STRIP.AUTO: 7 [PH]
PLATELET # BLD AUTO: 256 THOUSANDS/UL (ref 149–390)
PMV BLD AUTO: 10.7 FL (ref 8.9–12.7)
POTASSIUM SERPL-SCNC: 3.7 MMOL/L (ref 3.5–5.3)
PROT SERPL-MCNC: 6.5 G/DL (ref 6.4–8.4)
PROT UR STRIP-MCNC: ABNORMAL MG/DL
RBC # BLD AUTO: 4.15 MILLION/UL (ref 3.81–5.12)
RBC #/AREA URNS AUTO: NORMAL /HPF
SODIUM SERPL-SCNC: 141 MMOL/L (ref 135–147)
SP GR UR STRIP.AUTO: 1.02 (ref 1–1.03)
TRIGL SERPL-MCNC: 66 MG/DL (ref ?–150)
TSH SERPL DL<=0.05 MIU/L-ACNC: 1.54 UIU/ML (ref 0.45–4.5)
URATE SERPL-MCNC: 3.6 MG/DL (ref 2–7.5)
UROBILINOGEN UR STRIP-ACNC: <2 MG/DL
WBC # BLD AUTO: 4.19 THOUSAND/UL (ref 4.31–10.16)
WBC #/AREA URNS AUTO: NORMAL /HPF

## 2024-11-27 PROCEDURE — 80053 COMPREHEN METABOLIC PANEL: CPT

## 2024-11-27 PROCEDURE — S0612 ANNUAL GYNECOLOGICAL EXAMINA: HCPCS | Performed by: CLINICAL NURSE SPECIALIST

## 2024-11-27 PROCEDURE — 36415 COLL VENOUS BLD VENIPUNCTURE: CPT

## 2024-11-27 PROCEDURE — 85025 COMPLETE CBC W/AUTO DIFF WBC: CPT

## 2024-11-27 PROCEDURE — 83735 ASSAY OF MAGNESIUM: CPT

## 2024-11-27 PROCEDURE — 84550 ASSAY OF BLOOD/URIC ACID: CPT

## 2024-11-27 PROCEDURE — 84443 ASSAY THYROID STIM HORMONE: CPT

## 2024-11-27 PROCEDURE — 82248 BILIRUBIN DIRECT: CPT

## 2024-11-27 PROCEDURE — 81001 URINALYSIS AUTO W/SCOPE: CPT

## 2024-11-27 PROCEDURE — 80061 LIPID PANEL: CPT

## 2024-11-27 RX ORDER — MIRABEGRON 50 MG/1
1 TABLET, FILM COATED, EXTENDED RELEASE ORAL DAILY
Qty: 90 TABLET | Refills: 3 | Status: SHIPPED | OUTPATIENT
Start: 2024-11-27

## 2024-11-27 RX ORDER — ESTRADIOL 0.1 MG/G
1 CREAM VAGINAL 2 TIMES WEEKLY
Qty: 42.5 G | Refills: 2 | Status: SHIPPED | OUTPATIENT
Start: 2024-11-28

## 2024-12-01 NOTE — PROGRESS NOTES
Name: Ariela Colmenares      : 1965      MRN: [id number]  Encounter Provider: Bishnu So MD  Encounter Date: 2024   Encounter department: [facility]  :  Assessment & Plan  Well adult exam         Age-related osteoporosis without current pathological fracture  Patient to continue with weight bearing exercises as much as possible and oral intake of 1200 mg of calcium with 800 IU of Vit D to maximize bone protection. Pt  to continue  with DEXA scan every 2 years to reassess. All qustions regarding this problem answered today to patient satisfaction.          Gastroesophageal reflux disease with esophagitis without hemorrhage  Patient to continue with present therapy and decrease caffeine, avoid ETOH and smoking to decrease acid production. Pt should also cease eating prior to bedtime and avoid excessive fluid intake prior to sleep. May use antacids as needed for breakthrough GERD. All pateint questions answered today about this condition.          Other insomnia  Discussed with patient use of sedative  medications and good  sleep hygiene to maximize treatment for this problem. Pt  to use sedatives only prior to sleep and cautioned them about usage at any other time. All patient questions about this problem answered today.          OAB (overactive bladder)         Dermatitis    Orders:    hydrocortisone 2.5 % lotion; Apply topically 2 (two) times a day           History of Present Illness     59-year-old female today for yearly physical exam as well as checkup on multimedical problems patient history of overactive bladder and has been started on some Myrbetriq by her gynecologist.  Patient is asking if we would refill it for her if she needs to get that medicine refilled we will gladly do that for her she also is scheduled for a DEXA scan and a mammogram which we will wait for those results.  Patient lab work reviewed and she is doing very well.  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  Patient's history of GERD and is doing very well with that.  Patient also takes her supplements and is taking calcium and vitamin D.  Patient also history of some dermatitis and would like hydrocortisone lotion refilled for today which we will do for her and we will see her back in approximately 6 months.      Review of Systems       Objective   There were no vitals taken for this visit.     Physical Exam

## 2024-12-02 ENCOUNTER — OFFICE VISIT (OUTPATIENT)
Dept: FAMILY MEDICINE CLINIC | Facility: CLINIC | Age: 59
End: 2024-12-02
Payer: COMMERCIAL

## 2024-12-02 ENCOUNTER — RESULTS FOLLOW-UP (OUTPATIENT)
Dept: GASTROENTEROLOGY | Facility: CLINIC | Age: 59
End: 2024-12-02

## 2024-12-02 VITALS
BODY MASS INDEX: 22.09 KG/M2 | WEIGHT: 117 LBS | TEMPERATURE: 97.5 F | DIASTOLIC BLOOD PRESSURE: 70 MMHG | HEART RATE: 61 BPM | OXYGEN SATURATION: 99 % | SYSTOLIC BLOOD PRESSURE: 116 MMHG | HEIGHT: 61 IN

## 2024-12-02 DIAGNOSIS — L30.9 DERMATITIS: ICD-10-CM

## 2024-12-02 DIAGNOSIS — Z00.00 WELL ADULT EXAM: Primary | ICD-10-CM

## 2024-12-02 DIAGNOSIS — M81.0 AGE-RELATED OSTEOPOROSIS WITHOUT CURRENT PATHOLOGICAL FRACTURE: ICD-10-CM

## 2024-12-02 DIAGNOSIS — G47.09 OTHER INSOMNIA: ICD-10-CM

## 2024-12-02 DIAGNOSIS — N32.81 OAB (OVERACTIVE BLADDER): ICD-10-CM

## 2024-12-02 DIAGNOSIS — K21.00 GASTROESOPHAGEAL REFLUX DISEASE WITH ESOPHAGITIS WITHOUT HEMORRHAGE: ICD-10-CM

## 2024-12-02 PROCEDURE — 99396 PREV VISIT EST AGE 40-64: CPT | Performed by: FAMILY MEDICINE

## 2024-12-02 RX ORDER — HYDROCORTISONE 25 MG/ML
LOTION TOPICAL 2 TIMES DAILY
Qty: 118 ML | Refills: 3 | Status: SHIPPED | OUTPATIENT
Start: 2024-12-02

## 2025-02-09 DIAGNOSIS — K21.9 GASTROESOPHAGEAL REFLUX DISEASE WITHOUT ESOPHAGITIS: ICD-10-CM

## 2025-02-09 DIAGNOSIS — R10.13 EPIGASTRIC PAIN: ICD-10-CM

## 2025-02-10 RX ORDER — FAMOTIDINE 40 MG/1
40 TABLET, FILM COATED ORAL 2 TIMES DAILY
Qty: 60 TABLET | Refills: 5 | Status: SHIPPED | OUTPATIENT
Start: 2025-02-10

## 2025-02-25 ENCOUNTER — APPOINTMENT (OUTPATIENT)
Dept: RADIOLOGY | Facility: MEDICAL CENTER | Age: 60
End: 2025-02-25
Payer: COMMERCIAL

## 2025-02-25 ENCOUNTER — OFFICE VISIT (OUTPATIENT)
Dept: FAMILY MEDICINE CLINIC | Facility: CLINIC | Age: 60
End: 2025-02-25
Payer: COMMERCIAL

## 2025-02-25 VITALS
HEART RATE: 72 BPM | TEMPERATURE: 98 F | BODY MASS INDEX: 23.03 KG/M2 | RESPIRATION RATE: 16 BRPM | OXYGEN SATURATION: 96 % | DIASTOLIC BLOOD PRESSURE: 72 MMHG | WEIGHT: 122 LBS | SYSTOLIC BLOOD PRESSURE: 110 MMHG | HEIGHT: 61 IN

## 2025-02-25 DIAGNOSIS — R05.9 COUGH, UNSPECIFIED TYPE: ICD-10-CM

## 2025-02-25 DIAGNOSIS — R06.02 SOB (SHORTNESS OF BREATH): Primary | ICD-10-CM

## 2025-02-25 DIAGNOSIS — R06.02 SOB (SHORTNESS OF BREATH): ICD-10-CM

## 2025-02-25 PROCEDURE — 71046 X-RAY EXAM CHEST 2 VIEWS: CPT

## 2025-02-25 PROCEDURE — 99213 OFFICE O/P EST LOW 20 MIN: CPT | Performed by: NURSE PRACTITIONER

## 2025-02-25 RX ORDER — AZITHROMYCIN 250 MG/1
TABLET, FILM COATED ORAL
Qty: 6 TABLET | Refills: 0 | Status: SHIPPED | OUTPATIENT
Start: 2025-02-25 | End: 2025-03-01

## 2025-02-26 NOTE — PROGRESS NOTES
":  Assessment & Plan  SOB (shortness of breath)    Orders:    XR chest pa and lateral; Future    Cough, unspecified type    Orders:    XR chest pa and lateral; Future    azithromycin (Zithromax) 250 mg tablet; Take 2 tablets (500 mg total) by mouth daily for 1 day, THEN 1 tablet (250 mg total) daily for 4 days.    Uncomplicated URI will treat with azithromycin due to the length of complaint chest x-ray wet read was normal.  Dosing all possible side effects of the prescribed medications or medications that had been prescribed in the past were reviewed and all questions were answered.  Patient verbalized agreement and understanding of the plan of care as outlined during the office visit today return to office as indicated or sooner if a problem arises.      History of Present Illness     Ariela Colmenares is a 59 y.o. female     Patient is here with a complaint of upper respiratory tract discomfort has had a cough runny nose and some nasal congestion.  Denies any need nausea vomiting diarrhea chest pains or shortness of breath.  All over-the-counter medication attempted arm were unsuccessful.          Review of Systems   Constitutional:  Negative for chills.   HENT:  Positive for congestion and rhinorrhea. Negative for sore throat.    Respiratory:  Positive for cough.      Objective   /72 (BP Location: Left arm, Patient Position: Sitting, Cuff Size: Standard)   Pulse 72   Temp 98 °F (36.7 °C) (Temporal)   Resp 16   Ht 5' 1\" (1.549 m)   Wt 55.3 kg (122 lb)   SpO2 96%   BMI 23.05 kg/m²      Physical Exam  Vitals and nursing note reviewed.   Constitutional:       Appearance: Normal appearance. She is normal weight.   HENT:      Head: Normocephalic and atraumatic.      Right Ear: Tympanic membrane and external ear normal.      Left Ear: Tympanic membrane and external ear normal.      Nose: Nose normal.      Mouth/Throat:      Mouth: Mucous membranes are moist.      Pharynx: Oropharynx is clear.   Cardiovascular: "      Rate and Rhythm: Normal rate and regular rhythm.      Pulses: Normal pulses.      Heart sounds: Normal heart sounds.   Pulmonary:      Effort: Pulmonary effort is normal.      Breath sounds: Normal breath sounds.   Neurological:      General: No focal deficit present.      Mental Status: She is oriented to person, place, and time.   Psychiatric:         Mood and Affect: Mood normal.         Behavior: Behavior normal.         Thought Content: Thought content normal.         Judgment: Judgment normal.

## 2025-03-08 DIAGNOSIS — K21.9 GASTROESOPHAGEAL REFLUX DISEASE WITHOUT ESOPHAGITIS: ICD-10-CM

## 2025-03-08 DIAGNOSIS — R10.13 EPIGASTRIC PAIN: ICD-10-CM

## 2025-03-09 RX ORDER — FAMOTIDINE 40 MG/1
40 TABLET, FILM COATED ORAL 2 TIMES DAILY
Qty: 180 TABLET | Refills: 1 | Status: SHIPPED | OUTPATIENT
Start: 2025-03-09

## 2025-03-31 ENCOUNTER — HOSPITAL ENCOUNTER (OUTPATIENT)
Dept: RADIOLOGY | Age: 60
Discharge: HOME | End: 2025-03-31
Payer: COMMERCIAL

## 2025-03-31 VITALS — HEIGHT: 60 IN | WEIGHT: 119 LBS | BODY MASS INDEX: 23.36 KG/M2

## 2025-03-31 VITALS — WEIGHT: 115 LBS | HEIGHT: 61 IN | BODY MASS INDEX: 21.71 KG/M2

## 2025-03-31 DIAGNOSIS — Z78.0 POST-MENOPAUSAL: ICD-10-CM

## 2025-03-31 DIAGNOSIS — Z12.31 ENCOUNTER FOR SCREENING MAMMOGRAM FOR MALIGNANT NEOPLASM OF BREAST: ICD-10-CM

## 2025-03-31 DIAGNOSIS — M81.0 AGE-RELATED OSTEOPOROSIS WITHOUT CURRENT PATHOLOGICAL FRACTURE: ICD-10-CM

## 2025-03-31 PROCEDURE — 77063 BREAST TOMOSYNTHESIS BI: CPT

## 2025-03-31 PROCEDURE — 77080 DXA BONE DENSITY AXIAL: CPT

## 2025-03-31 PROCEDURE — 77067 SCR MAMMO BI INCL CAD: CPT

## 2025-04-01 ENCOUNTER — RESULTS FOLLOW-UP (OUTPATIENT)
Dept: OBGYN CLINIC | Facility: MEDICAL CENTER | Age: 60
End: 2025-04-01

## 2025-04-01 NOTE — RESULT ENCOUNTER NOTE
Dexa showing still in osteoporosis range but is improving.  Continue efforts and tx for this.  Message sent via [employer]

## 2025-04-02 ENCOUNTER — RESULTS FOLLOW-UP (OUTPATIENT)
Dept: LABOR AND DELIVERY | Facility: HOSPITAL | Age: 60
End: 2025-04-02

## 2025-04-11 ENCOUNTER — TELEPHONE (OUTPATIENT)
Dept: GASTROENTEROLOGY | Facility: CLINIC | Age: 60
End: 2025-04-11

## 2025-04-11 NOTE — TELEPHONE ENCOUNTER
You had done an egd in 2024 on this pt ,there is an old egd in recall. Can you please advise when next egd is due ? Thanks Teressa

## 2025-04-12 NOTE — PROGRESS NOTES
Name: Ariela Colmenares      : 1965      MRN: [id number]  Encounter Provider: Bishnu So MD  Encounter Date: 2025   Encounter department: [facility]  :  Assessment & Plan  Age-related osteoporosis without current pathological fracture  Patient to continue with weight bearing exercises as much as possible and oral intake of 1200 mg of calcium with 800 IU of Vit D to maximize bone protection. Pt  to continue  with DEXA scan every 2 years to reassess. All qustions regarding this problem answered today to patient satisfaction. For Prolia today.  Orders:  •  denosumab (PROLIA) subcutaneous injection 60 mg    Gastroesophageal reflux disease with esophagitis without hemorrhage  Patient to continue with present therapy and decrease caffeine, avoid ETOH and smoking to decrease acid production. Pt should also cease eating prior to bedtime and avoid excessive fluid intake prior to sleep. May use antacids as needed for breakthrough GERD. All pateint questions answered today about this condition.        Other insomnia  Discussed with patient use of sedative  medications and good  sleep hygiene to maximize treatment for this problem. Pt  to use sedatives only prior to sleep and cautioned them about usage at any other time. All patient questions about this problem answered today.        Pain and swelling of toe, unspecified laterality    Orders:  •  Ambulatory Referral to Podiatry; Future          Depression Screening and Follow-up Plan:   Clincally patient does not have depression. No treatment is required.     History of Present Illness   59-year-old female here today for checkup on multimedical problems patient with a history of osteoporosis and recent DEXA scan which shows significant improvement in her bone density.  Patient is in need of Prolia today and we will get that for her today.  Patient also is a schoolteacher and had some questions about needing a possible measles  vaccination with the recent outbreak in the [place] I told her as long as she has been immunized for measles she should not have that issue if she would like to get a booster she could but if she has been immunized and not exposed anybody she really does not need to do that.  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  Patient also with some pain in both of her feet in the behind the fourth toe on the plantar surface suggestive of a Anthony's neuroma we will see about getting her to see one of the podiatrist for further evaluation of this.      Review of Systems   Constitutional:  Negative for activity change, appetite change, fatigue and fever.   HENT:  Negative for congestion, ear pain, postnasal drip, rhinorrhea, sinus pressure, sinus pain, sneezing and sore throat.    Eyes:  Negative for pain and redness.   Respiratory:  Negative for apnea, cough, chest tightness, shortness of breath and wheezing.    Cardiovascular:  Negative for chest pain, palpitations and leg swelling.   Gastrointestinal:  Negative for abdominal pain, constipation, diarrhea, nausea and vomiting.   Endocrine: Negative for cold intolerance and heat intolerance.   Genitourinary:  Negative for difficulty urinating, dysuria, frequency, hematuria and urgency.   Musculoskeletal:  Negative for arthralgias, back pain, gait problem and myalgias.   Skin:  Negative for rash.   Neurological:  Negative for dizziness, speech difficulty, weakness, numbness and headaches.   Hematological:  Does not bruise/bleed easily.   Psychiatric/Behavioral:  Negative for agitation, confusion and hallucinations.        Objective   /70 (BP Location: Left arm, Patient Position: Sitting, Cuff Size: Standard)   Pulse 71   Temp (!) 97.2 °F (36.2 °C) (Skin)   Ht 5' (1.524 m)   Wt 55.3 kg (122 lb)    SpO2 97%   BMI 23.83 kg/m²      Physical Exam  Constitutional:       Appearance: Normal appearance. She is not ill-appearing.   HENT:      Head: Normocephalic and atraumatic.      Right Ear: Tympanic membrane normal.      Left Ear: Tympanic membrane normal.      Nose: Nose normal.      Mouth/Throat:      Mouth: Mucous membranes are moist.   Eyes:      Extraocular Movements: Extraocular movements intact.      Conjunctiva/sclera: Conjunctivae normal.      Pupils: Pupils are equal, round, and reactive to light.   Cardiovascular:      Rate and Rhythm: Normal rate and regular rhythm.   Pulmonary:      Effort: Pulmonary effort is normal. No respiratory distress.      Breath sounds: Normal breath sounds. No wheezing.   Abdominal:      General: Bowel sounds are normal.      Palpations: Abdomen is soft.      Tenderness: There is no abdominal tenderness.   Musculoskeletal:         General: No tenderness. Normal range of motion.      Cervical back: Normal range of motion and neck supple.      Right lower leg: No edema.      Left lower leg: No edema.   Skin:     General: Skin is warm and dry.   Neurological:      Mental Status: She is alert and oriented to person, place, and time.   Psychiatric:         Mood and Affect: Mood normal.         Behavior: Behavior normal.         Thought Content: Thought content normal.         Judgment: Judgment normal.

## 2025-04-12 NOTE — ASSESSMENT & PLAN NOTE
Patient to continue with weight bearing exercises as much as possible and oral intake of 1200 mg of calcium with 800 IU of Vit D to maximize bone protection. Pt  to continue  with DEXA scan every 2 years to reassess. All qustions regarding this problem answered today to patient satisfaction. For Prolia today.  Orders:  •  denosumab (PROLIA) subcutaneous injection 60 mg

## 2025-04-14 ENCOUNTER — OFFICE VISIT (OUTPATIENT)
Dept: FAMILY MEDICINE CLINIC | Facility: CLINIC | Age: 60
End: 2025-04-14
Payer: COMMERCIAL

## 2025-04-14 VITALS
OXYGEN SATURATION: 97 % | BODY MASS INDEX: 23.95 KG/M2 | WEIGHT: 122 LBS | HEIGHT: 60 IN | TEMPERATURE: 97.2 F | HEART RATE: 71 BPM | SYSTOLIC BLOOD PRESSURE: 100 MMHG | DIASTOLIC BLOOD PRESSURE: 70 MMHG

## 2025-04-14 DIAGNOSIS — K21.00 GASTROESOPHAGEAL REFLUX DISEASE WITH ESOPHAGITIS WITHOUT HEMORRHAGE: ICD-10-CM

## 2025-04-14 DIAGNOSIS — M81.0 AGE-RELATED OSTEOPOROSIS WITHOUT CURRENT PATHOLOGICAL FRACTURE: Primary | ICD-10-CM

## 2025-04-14 DIAGNOSIS — M79.89 PAIN AND SWELLING OF TOE, UNSPECIFIED LATERALITY: ICD-10-CM

## 2025-04-14 DIAGNOSIS — M79.676 PAIN AND SWELLING OF TOE, UNSPECIFIED LATERALITY: ICD-10-CM

## 2025-04-14 DIAGNOSIS — G47.09 OTHER INSOMNIA: ICD-10-CM

## 2025-04-14 PROCEDURE — 96372 THER/PROPH/DIAG INJ SC/IM: CPT | Performed by: FAMILY MEDICINE

## 2025-04-14 PROCEDURE — 99214 OFFICE O/P EST MOD 30 MIN: CPT | Performed by: FAMILY MEDICINE

## 2025-04-29 ENCOUNTER — TELEPHONE (OUTPATIENT)
Age: 60
End: 2025-04-29

## 2025-04-29 NOTE — TELEPHONE ENCOUNTER
Pt received a missed call from out office. She stated the call was from Annabella in regards to her prolia injection. I was able to connect the pt with Annabella.

## 2025-05-09 DIAGNOSIS — R10.13 EPIGASTRIC PAIN: ICD-10-CM

## 2025-05-09 DIAGNOSIS — R07.82 INTERCOSTAL PAIN: ICD-10-CM

## 2025-05-09 RX ORDER — PANTOPRAZOLE SODIUM 40 MG/1
40 TABLET, DELAYED RELEASE ORAL DAILY
Qty: 90 TABLET | Refills: 0 | Status: SHIPPED | OUTPATIENT
Start: 2025-05-09

## 2025-06-15 DIAGNOSIS — L23.7 CONTACT DERMATITIS DUE TO POISON IVY: ICD-10-CM

## 2025-06-15 RX ORDER — TRIAMCINOLONE ACETONIDE 1 MG/G
1 CREAM TOPICAL 2 TIMES DAILY
Qty: 15 G | Refills: 2 | Status: SHIPPED | OUTPATIENT
Start: 2025-06-15

## (undated) DEVICE — GLOVE INDICATOR PI UNDERGLOVE SZ 8 BLUE

## (undated) DEVICE — DRAPE C-ARM X-RAY

## (undated) DEVICE — TROCAR: Brand: KII SLEEVE

## (undated) DEVICE — SCD SEQUENTIAL COMPRESSION COMFORT SLEEVE MEDIUM KNEE LENGTH: Brand: KENDALL SCD

## (undated) DEVICE — IRRIG ENDO FLO TUBING

## (undated) DEVICE — SUT PROLENE 2-0 MO-6 30 IN 8417H

## (undated) DEVICE — GLOVE SRG BIOGEL 6.5

## (undated) DEVICE — SUT VICRYL 0 CT-1 36 IN J946H

## (undated) DEVICE — CATH FOLEY 18FR 5ML 2 WAY UNCOATED SILICONE

## (undated) DEVICE — LIGAMAX 5 MM ENDOSCOPIC MULTIPLE CLIP APPLIER: Brand: LIGAMAX

## (undated) DEVICE — ELECTRODE LAP J HOOK SPLIT STEM E-Z CLEAN 33CM -0021S

## (undated) DEVICE — EXIDINE 4 PCT

## (undated) DEVICE — TISSUE RETRIEVAL SYSTEM: Brand: INZII RETRIEVAL SYSTEM

## (undated) DEVICE — 5 MM CURVED DISSECTORS WITH MONOPOLAR CAUTERY: Brand: ENDOPATH

## (undated) DEVICE — NEEDLE 25G X 1 1/2

## (undated) DEVICE — MEDI-VAC YANKAUER SUCTION HANDLE W/BULBOUS AND CONTROL VENT: Brand: CARDINAL HEALTH

## (undated) DEVICE — Device: Brand: MEDEX

## (undated) DEVICE — TUBING SMOKE EVAC W/FILTRATION DEVICE PLUMEPORT ACTIV

## (undated) DEVICE — PLUMEPEN PRO 10FT

## (undated) DEVICE — SYRINGE 20ML LL

## (undated) DEVICE — GLOVE PI ULTRA TOUCH SZ.8.0

## (undated) DEVICE — TROCAR: Brand: KII FIOS FIRST ENTRY

## (undated) DEVICE — PREMIUM DRY TRAY LF: Brand: MEDLINE INDUSTRIES, INC.

## (undated) DEVICE — ELECTROSURGICAL DEVICE HOLSTER;FOR USE WITH MAXIMUM PEAK VOLTAGE OF 4000 V: Brand: FORCE TRIVERSE

## (undated) DEVICE — TAUT CATH INTRODUCER 4.5 FR

## (undated) DEVICE — IV FLUSH NSS 10ML POSIFLUSH

## (undated) DEVICE — 3000CC GUARDIAN II: Brand: GUARDIAN

## (undated) DEVICE — BETHLEHEM UNIVERSAL MINOR GEN: Brand: CARDINAL HEALTH

## (undated) DEVICE — STOPCOCK 3-WAY

## (undated) DEVICE — ADHESIVE SKIN HIGH VISCOSITY EXOFIN 1ML

## (undated) DEVICE — ALLENTOWN DR  LUCENTE S LAP PK: Brand: CARDINAL HEALTH

## (undated) DEVICE — DRAPE EQUIPMENT RF WAND

## (undated) DEVICE — [HIGH FLOW INSUFFLATOR,  DO NOT USE IF PACKAGE IS DAMAGED,  KEEP DRY,  KEEP AWAY FROM SUNLIGHT,  PROTECT FROM HEAT AND RADIOACTIVE SOURCES.]: Brand: PNEUMOSURE

## (undated) DEVICE — 2000CC GUARDIAN II: Brand: GUARDIAN

## (undated) DEVICE — UNDER BUTTOCKS DRAPE W/FLUID CONTROL POUCH: Brand: CONVERTORS

## (undated) DEVICE — SUT MONOCRYL 4-0 PS-2 27 IN Y426H

## (undated) DEVICE — SYRINGE 30ML LL

## (undated) DEVICE — SUT VICRYL 2-0 SH 27 IN UNDYED J417H

## (undated) DEVICE — CHLORAPREP HI-LITE 26ML ORANGE

## (undated) DEVICE — GLOVE INDICATOR PI UNDERGLOVE SZ 6.5 BLUE

## (undated) DEVICE — SUT VICRYL 3-0 SH 27 IN J416H

## (undated) DEVICE — INTENDED FOR TISSUE SEPARATION, AND OTHER PROCEDURES THAT REQUIRE A SHARP SURGICAL BLADE TO PUNCTURE OR CUT.: Brand: BARD-PARKER SAFETY BLADES SIZE 11, STERILE

## (undated) DEVICE — DRAPE LAPAROTOMY W/POUCHES

## (undated) DEVICE — ALLENTOWN LAP CHOLE APP PACK: Brand: CARDINAL HEALTH

## (undated) DEVICE — TAUT INTRODUCER KIT

## (undated) DEVICE — GLOVE SRG BIOGEL ECLIPSE 7.5

## (undated) DEVICE — VIAL DECANTER

## (undated) DEVICE — SMOKE EVACUATION TUBING WITH 8 IN INTEGRAL WAND AND SPONGE GUARD: Brand: BUFFALO FILTER

## (undated) DEVICE — NEEDLE HYPO 22G X 1-1/2 IN

## (undated) DEVICE — BLUE HEAT SCOPE WARMER

## (undated) DEVICE — ENDOPATH 5MM CURVED SCISSORS WITH MONOPOLAR CAUTERY: Brand: ENDOPATH

## (undated) DEVICE — BULB SYRINGE,IRRIGATION WITH PROTECTIVE CAP: Brand: DOVER

## (undated) DEVICE — TUBING SUCTION 5MM X 12 FT

## (undated) DEVICE — SUT VICRYL 0 UR-6 27 IN J603H

## (undated) DEVICE — CAUTERY TIP POLISHER: Brand: DEVON

## (undated) DEVICE — PMI DISPOSABLE PUNCTURE CLOSURE DEVICE / SUTURE GRASPER: Brand: PMI

## (undated) DEVICE — INTENDED FOR TISSUE SEPARATION, AND OTHER PROCEDURES THAT REQUIRE A SHARP SURGICAL BLADE TO PUNCTURE OR CUT.: Brand: BARD-PARKER SAFETY BLADES SIZE 15, STERILE